# Patient Record
Sex: MALE | Race: WHITE | NOT HISPANIC OR LATINO | Employment: OTHER | URBAN - METROPOLITAN AREA
[De-identification: names, ages, dates, MRNs, and addresses within clinical notes are randomized per-mention and may not be internally consistent; named-entity substitution may affect disease eponyms.]

---

## 2017-03-06 ENCOUNTER — HOSPITAL ENCOUNTER (OUTPATIENT)
Dept: CT IMAGING | Facility: HOSPITAL | Age: 72
Discharge: HOME | End: 2017-03-06
Payer: MEDICARE

## 2017-03-06 DIAGNOSIS — I71.2 THORACIC AORTIC ANEURYSM WITHOUT RUPTURE (HCC): ICD-10-CM

## 2017-03-06 PROCEDURE — 71250 CT THORAX DX C-: CPT

## 2017-03-07 ENCOUNTER — TRANSCRIBE ORDERS (OUTPATIENT)
Dept: ADMINISTRATIVE | Facility: HOSPITAL | Age: 72
End: 2017-03-07

## 2017-03-07 ENCOUNTER — GENERIC CONVERSION - ENCOUNTER (OUTPATIENT)
Dept: OTHER | Facility: OTHER | Age: 72
End: 2017-03-07

## 2017-03-07 ENCOUNTER — HOSPITAL ENCOUNTER (EMERGENCY)
Facility: HOSPITAL | Age: 72
Discharge: HOME | End: 2017-03-07
Attending: EMERGENCY MEDICINE
Payer: MEDICARE

## 2017-03-07 ENCOUNTER — HOSPITAL ENCOUNTER (OUTPATIENT)
Dept: ULTRASOUND IMAGING | Facility: HOSPITAL | Age: 72
Discharge: HOME | End: 2017-03-07
Payer: MEDICARE

## 2017-03-07 ENCOUNTER — HOSPITAL ENCOUNTER (OUTPATIENT)
Dept: CT IMAGING | Facility: HOSPITAL | Age: 72
Discharge: HOME | End: 2017-03-07
Payer: MEDICARE

## 2017-03-07 VITALS
DIASTOLIC BLOOD PRESSURE: 83 MMHG | HEART RATE: 56 BPM | SYSTOLIC BLOOD PRESSURE: 153 MMHG | WEIGHT: 183 LBS | OXYGEN SATURATION: 95 % | TEMPERATURE: 98.4 F | RESPIRATION RATE: 18 BRPM

## 2017-03-07 DIAGNOSIS — T81.718A IATROGENIC PULMONARY EMBOLISM AND INFARCTION, INITIAL ENCOUNTER (HCC): Primary | ICD-10-CM

## 2017-03-07 DIAGNOSIS — I26.99 IATROGENIC PULMONARY EMBOLISM AND INFARCTION, INITIAL ENCOUNTER (HCC): ICD-10-CM

## 2017-03-07 DIAGNOSIS — I82.402 ACUTE DEEP VEIN THROMBOSIS (DVT) OF LEFT LOWER EXTREMITY (HCC): Primary | ICD-10-CM

## 2017-03-07 DIAGNOSIS — I26.99 IATROGENIC PULMONARY EMBOLISM AND INFARCTION, INITIAL ENCOUNTER (HCC): Primary | ICD-10-CM

## 2017-03-07 DIAGNOSIS — I26.99 OTHER PULMONARY EMBOLISM WITHOUT ACUTE COR PULMONALE (HCC): ICD-10-CM

## 2017-03-07 DIAGNOSIS — T81.718A IATROGENIC PULMONARY EMBOLISM AND INFARCTION, INITIAL ENCOUNTER (HCC): ICD-10-CM

## 2017-03-07 PROCEDURE — 71275 CT ANGIOGRAPHY CHEST: CPT

## 2017-03-07 PROCEDURE — 93970 EXTREMITY STUDY: CPT

## 2017-03-07 PROCEDURE — 99283 EMERGENCY DEPT VISIT LOW MDM: CPT

## 2017-03-07 RX ADMIN — IOHEXOL 85 ML: 350 INJECTION, SOLUTION INTRAVENOUS at 18:13

## 2017-03-08 ENCOUNTER — GENERIC CONVERSION - ENCOUNTER (OUTPATIENT)
Dept: OTHER | Facility: OTHER | Age: 72
End: 2017-03-08

## 2017-03-13 ENCOUNTER — ALLSCRIPTS OFFICE VISIT (OUTPATIENT)
Dept: OTHER | Facility: OTHER | Age: 72
End: 2017-03-13

## 2017-03-21 ENCOUNTER — APPOINTMENT (OUTPATIENT)
Dept: LAB | Facility: CLINIC | Age: 72
End: 2017-03-21
Payer: MEDICARE

## 2017-03-21 ENCOUNTER — GENERIC CONVERSION - ENCOUNTER (OUTPATIENT)
Dept: OTHER | Facility: OTHER | Age: 72
End: 2017-03-21

## 2017-03-21 ENCOUNTER — TRANSCRIBE ORDERS (OUTPATIENT)
Dept: LAB | Facility: CLINIC | Age: 72
End: 2017-03-21

## 2017-03-21 DIAGNOSIS — D64.9 ANEMIA: ICD-10-CM

## 2017-03-21 DIAGNOSIS — E03.9 HYPOTHYROIDISM: ICD-10-CM

## 2017-03-21 DIAGNOSIS — I10 ESSENTIAL (PRIMARY) HYPERTENSION: ICD-10-CM

## 2017-03-21 DIAGNOSIS — E55.9 VITAMIN D DEFICIENCY: ICD-10-CM

## 2017-03-21 DIAGNOSIS — Z12.5 ENCOUNTER FOR SCREENING FOR MALIGNANT NEOPLASM OF PROSTATE: ICD-10-CM

## 2017-03-21 DIAGNOSIS — F41.9 ANXIETY DISORDER: ICD-10-CM

## 2017-03-21 DIAGNOSIS — R73.09 OTHER ABNORMAL GLUCOSE: ICD-10-CM

## 2017-03-21 DIAGNOSIS — E78.5 HYPERLIPIDEMIA: ICD-10-CM

## 2017-03-21 LAB
25(OH)D3 SERPL-MCNC: 70.8 NG/ML (ref 30–100)
ALBUMIN SERPL BCP-MCNC: 4 G/DL (ref 3.5–5)
ALP SERPL-CCNC: 65 U/L (ref 46–116)
ALT SERPL W P-5'-P-CCNC: 22 U/L (ref 12–78)
ANION GAP SERPL CALCULATED.3IONS-SCNC: 8 MMOL/L (ref 4–13)
AST SERPL W P-5'-P-CCNC: 20 U/L (ref 5–45)
BASOPHILS # BLD AUTO: 0.06 THOUSANDS/ΜL (ref 0–0.1)
BASOPHILS NFR BLD AUTO: 1 % (ref 0–1)
BILIRUB SERPL-MCNC: 1.21 MG/DL (ref 0.2–1)
BUN SERPL-MCNC: 14 MG/DL (ref 5–25)
CALCIUM SERPL-MCNC: 8.3 MG/DL (ref 8.3–10.1)
CHLORIDE SERPL-SCNC: 98 MMOL/L (ref 100–108)
CHOLEST SERPL-MCNC: 148 MG/DL (ref 50–200)
CO2 SERPL-SCNC: 27 MMOL/L (ref 21–32)
CREAT SERPL-MCNC: 0.9 MG/DL (ref 0.6–1.3)
EOSINOPHIL # BLD AUTO: 0.07 THOUSAND/ΜL (ref 0–0.61)
EOSINOPHIL NFR BLD AUTO: 1 % (ref 0–6)
ERYTHROCYTE [DISTWIDTH] IN BLOOD BY AUTOMATED COUNT: 15.8 % (ref 11.6–15.1)
EST. AVERAGE GLUCOSE BLD GHB EST-MCNC: 134 MG/DL
GFR SERPL CREATININE-BSD FRML MDRD: >60 ML/MIN/1.73SQ M
GLUCOSE P FAST SERPL-MCNC: 100 MG/DL (ref 65–99)
HBA1C MFR BLD: 6.3 % (ref 4.2–6.3)
HCT VFR BLD AUTO: 40.3 % (ref 36.5–49.3)
HDLC SERPL-MCNC: 61 MG/DL (ref 40–60)
HGB BLD-MCNC: 13.9 G/DL (ref 12–17)
LDLC SERPL CALC-MCNC: 80 MG/DL (ref 0–100)
LYMPHOCYTES # BLD AUTO: 2.53 THOUSANDS/ΜL (ref 0.6–4.47)
LYMPHOCYTES NFR BLD AUTO: 46 % (ref 14–44)
MCH RBC QN AUTO: 28.9 PG (ref 26.8–34.3)
MCHC RBC AUTO-ENTMCNC: 34.5 G/DL (ref 31.4–37.4)
MCV RBC AUTO: 84 FL (ref 82–98)
MONOCYTES # BLD AUTO: 0.44 THOUSAND/ΜL (ref 0.17–1.22)
MONOCYTES NFR BLD AUTO: 8 % (ref 4–12)
NEUTROPHILS # BLD AUTO: 2.43 THOUSANDS/ΜL (ref 1.85–7.62)
NEUTS SEG NFR BLD AUTO: 44 % (ref 43–75)
NRBC BLD AUTO-RTO: 0 /100 WBCS
PLATELET # BLD AUTO: 254 THOUSANDS/UL (ref 149–390)
PMV BLD AUTO: 10.5 FL (ref 8.9–12.7)
POTASSIUM SERPL-SCNC: 4.4 MMOL/L (ref 3.5–5.3)
PROT SERPL-MCNC: 7.2 G/DL (ref 6.4–8.2)
RBC # BLD AUTO: 4.81 MILLION/UL (ref 3.88–5.62)
SODIUM SERPL-SCNC: 133 MMOL/L (ref 136–145)
T4 FREE SERPL-MCNC: 1.11 NG/DL (ref 0.76–1.46)
TRIGL SERPL-MCNC: 37 MG/DL
TSH SERPL DL<=0.05 MIU/L-ACNC: 1.48 UIU/ML (ref 0.36–3.74)
WBC # BLD AUTO: 5.54 THOUSAND/UL (ref 4.31–10.16)

## 2017-03-21 PROCEDURE — 84443 ASSAY THYROID STIM HORMONE: CPT

## 2017-03-21 PROCEDURE — 84154 ASSAY OF PSA FREE: CPT

## 2017-03-21 PROCEDURE — 83036 HEMOGLOBIN GLYCOSYLATED A1C: CPT

## 2017-03-21 PROCEDURE — 36415 COLL VENOUS BLD VENIPUNCTURE: CPT

## 2017-03-21 PROCEDURE — G0103 PSA SCREENING: HCPCS

## 2017-03-21 PROCEDURE — 82306 VITAMIN D 25 HYDROXY: CPT

## 2017-03-21 PROCEDURE — 84439 ASSAY OF FREE THYROXINE: CPT

## 2017-03-21 PROCEDURE — 80061 LIPID PANEL: CPT

## 2017-03-21 PROCEDURE — 80053 COMPREHEN METABOLIC PANEL: CPT

## 2017-03-21 PROCEDURE — 85025 COMPLETE CBC W/AUTO DIFF WBC: CPT

## 2017-03-22 ENCOUNTER — GENERIC CONVERSION - ENCOUNTER (OUTPATIENT)
Dept: OTHER | Facility: OTHER | Age: 72
End: 2017-03-22

## 2017-03-22 LAB
PSA FREE MFR SERPL: 27.3 %
PSA FREE SERPL-MCNC: 0.9 NG/ML
PSA SERPL-MCNC: 3.3 NG/ML (ref 0–4)

## 2017-03-27 ENCOUNTER — ALLSCRIPTS OFFICE VISIT (OUTPATIENT)
Dept: OTHER | Facility: OTHER | Age: 72
End: 2017-03-27

## 2017-04-04 ENCOUNTER — GENERIC CONVERSION - ENCOUNTER (OUTPATIENT)
Dept: OTHER | Facility: OTHER | Age: 72
End: 2017-04-04

## 2017-04-04 ENCOUNTER — HOSPITAL ENCOUNTER (OUTPATIENT)
Dept: NON INVASIVE DIAGNOSTICS | Facility: CLINIC | Age: 72
Discharge: HOME | End: 2017-04-04
Payer: MEDICARE

## 2017-04-04 DIAGNOSIS — I82.442 EMBOLISM AND THROMBOSIS OF LEFT TIBIAL VEIN (HCC): ICD-10-CM

## 2017-04-04 PROCEDURE — 93970 EXTREMITY STUDY: CPT

## 2017-06-20 ENCOUNTER — APPOINTMENT (OUTPATIENT)
Dept: LAB | Facility: CLINIC | Age: 72
End: 2017-06-20
Payer: MEDICARE

## 2017-06-20 ENCOUNTER — TRANSCRIBE ORDERS (OUTPATIENT)
Dept: LAB | Facility: CLINIC | Age: 72
End: 2017-06-20

## 2017-06-20 ENCOUNTER — GENERIC CONVERSION - ENCOUNTER (OUTPATIENT)
Dept: OTHER | Facility: OTHER | Age: 72
End: 2017-06-20

## 2017-06-20 DIAGNOSIS — Z00.00 ENCOUNTER FOR GENERAL ADULT MEDICAL EXAMINATION WITHOUT ABNORMAL FINDINGS: ICD-10-CM

## 2017-06-20 DIAGNOSIS — E55.9 VITAMIN D DEFICIENCY: ICD-10-CM

## 2017-06-20 DIAGNOSIS — E03.9 HYPOTHYROIDISM: ICD-10-CM

## 2017-06-20 DIAGNOSIS — I10 ESSENTIAL (PRIMARY) HYPERTENSION: ICD-10-CM

## 2017-06-20 DIAGNOSIS — E78.5 HYPERLIPIDEMIA: ICD-10-CM

## 2017-06-20 DIAGNOSIS — F41.9 ANXIETY DISORDER: ICD-10-CM

## 2017-06-20 DIAGNOSIS — I82.442 EMBOLISM AND THROMBOSIS OF LEFT TIBIAL VEIN (HCC): ICD-10-CM

## 2017-06-20 DIAGNOSIS — I71.2 THORACIC AORTIC ANEURYSM WITHOUT RUPTURE (HCC): ICD-10-CM

## 2017-06-20 DIAGNOSIS — R73.03 PREDIABETES: ICD-10-CM

## 2017-06-20 LAB
ALBUMIN SERPL BCP-MCNC: 4 G/DL (ref 3.5–5)
ALP SERPL-CCNC: 57 U/L (ref 46–116)
ALT SERPL W P-5'-P-CCNC: 26 U/L (ref 12–78)
ANION GAP SERPL CALCULATED.3IONS-SCNC: 9 MMOL/L (ref 4–13)
AST SERPL W P-5'-P-CCNC: 22 U/L (ref 5–45)
BILIRUB SERPL-MCNC: 1.47 MG/DL (ref 0.2–1)
BUN SERPL-MCNC: 14 MG/DL (ref 5–25)
CALCIUM SERPL-MCNC: 8.7 MG/DL (ref 8.3–10.1)
CHLORIDE SERPL-SCNC: 96 MMOL/L (ref 100–108)
CO2 SERPL-SCNC: 27 MMOL/L (ref 21–32)
CREAT SERPL-MCNC: 0.8 MG/DL (ref 0.6–1.3)
EST. AVERAGE GLUCOSE BLD GHB EST-MCNC: 123 MG/DL
GFR SERPL CREATININE-BSD FRML MDRD: >60 ML/MIN/1.73SQ M
GLUCOSE P FAST SERPL-MCNC: 99 MG/DL (ref 65–99)
HBA1C MFR BLD: 5.9 % (ref 4.2–6.3)
POTASSIUM SERPL-SCNC: 4.6 MMOL/L (ref 3.5–5.3)
PROT SERPL-MCNC: 6.9 G/DL (ref 6.4–8.2)
SODIUM SERPL-SCNC: 132 MMOL/L (ref 136–145)

## 2017-06-20 PROCEDURE — 80053 COMPREHEN METABOLIC PANEL: CPT

## 2017-06-20 PROCEDURE — 83036 HEMOGLOBIN GLYCOSYLATED A1C: CPT

## 2017-06-20 PROCEDURE — 36415 COLL VENOUS BLD VENIPUNCTURE: CPT

## 2017-06-21 ENCOUNTER — TRANSCRIBE ORDERS (OUTPATIENT)
Dept: ADMINISTRATIVE | Facility: HOSPITAL | Age: 72
End: 2017-06-21

## 2017-06-21 DIAGNOSIS — C73 THYROID CANCER (HCC): Primary | ICD-10-CM

## 2017-06-22 ENCOUNTER — HOSPITAL ENCOUNTER (OUTPATIENT)
Dept: ULTRASOUND IMAGING | Facility: HOSPITAL | Age: 72
Discharge: HOME | End: 2017-06-22
Attending: INTERNAL MEDICINE
Payer: MEDICARE

## 2017-06-22 DIAGNOSIS — C73 THYROID CANCER (HCC): ICD-10-CM

## 2017-06-22 PROCEDURE — 76536 US EXAM OF HEAD AND NECK: CPT

## 2017-06-27 ENCOUNTER — GENERIC CONVERSION - ENCOUNTER (OUTPATIENT)
Dept: OTHER | Facility: OTHER | Age: 72
End: 2017-06-27

## 2017-06-29 ENCOUNTER — ALLSCRIPTS OFFICE VISIT (OUTPATIENT)
Dept: OTHER | Facility: OTHER | Age: 72
End: 2017-06-29

## 2017-06-29 ENCOUNTER — TRANSCRIBE ORDERS (OUTPATIENT)
Dept: ADMINISTRATIVE | Facility: HOSPITAL | Age: 72
End: 2017-06-29

## 2017-06-29 DIAGNOSIS — R91.8 PULMONARY NODULES: Primary | ICD-10-CM

## 2017-06-29 DIAGNOSIS — E03.9 HYPOTHYROIDISM: ICD-10-CM

## 2017-06-29 DIAGNOSIS — R60.0 LOCALIZED EDEMA: ICD-10-CM

## 2017-06-29 DIAGNOSIS — R91.8 OTHER NONSPECIFIC ABNORMAL FINDING OF LUNG FIELD: ICD-10-CM

## 2017-07-25 ENCOUNTER — ALLSCRIPTS OFFICE VISIT (OUTPATIENT)
Dept: OTHER | Facility: OTHER | Age: 72
End: 2017-07-25

## 2017-09-01 DIAGNOSIS — Z01.812 ENCOUNTER FOR PREPROCEDURAL LABORATORY EXAMINATION: ICD-10-CM

## 2017-09-11 ENCOUNTER — GENERIC CONVERSION - ENCOUNTER (OUTPATIENT)
Dept: OTHER | Facility: OTHER | Age: 72
End: 2017-09-11

## 2017-09-11 ENCOUNTER — TRANSCRIBE ORDERS (OUTPATIENT)
Dept: LAB | Facility: CLINIC | Age: 72
End: 2017-09-11

## 2017-09-11 ENCOUNTER — APPOINTMENT (OUTPATIENT)
Dept: LAB | Facility: CLINIC | Age: 72
End: 2017-09-11
Payer: MEDICARE

## 2017-09-11 DIAGNOSIS — Z01.812 ENCOUNTER FOR PREPROCEDURAL LABORATORY EXAMINATION: ICD-10-CM

## 2017-09-11 LAB
ANION GAP SERPL CALCULATED.3IONS-SCNC: 7 MMOL/L (ref 4–13)
BUN SERPL-MCNC: 12 MG/DL (ref 5–25)
CALCIUM SERPL-MCNC: 8.8 MG/DL (ref 8.3–10.1)
CHLORIDE SERPL-SCNC: 100 MMOL/L (ref 100–108)
CO2 SERPL-SCNC: 28 MMOL/L (ref 21–32)
CREAT SERPL-MCNC: 0.78 MG/DL (ref 0.6–1.3)
GFR SERPL CREATININE-BSD FRML MDRD: 90 ML/MIN/1.73SQ M
GLUCOSE P FAST SERPL-MCNC: 79 MG/DL (ref 65–99)
POTASSIUM SERPL-SCNC: 4 MMOL/L (ref 3.5–5.3)
SODIUM SERPL-SCNC: 135 MMOL/L (ref 136–145)

## 2017-09-11 PROCEDURE — 36415 COLL VENOUS BLD VENIPUNCTURE: CPT

## 2017-09-11 PROCEDURE — 80048 BASIC METABOLIC PNL TOTAL CA: CPT

## 2017-09-19 ENCOUNTER — ALLSCRIPTS OFFICE VISIT (OUTPATIENT)
Dept: OTHER | Facility: OTHER | Age: 72
End: 2017-09-19

## 2017-09-22 ENCOUNTER — ALLSCRIPTS OFFICE VISIT (OUTPATIENT)
Dept: OTHER | Facility: OTHER | Age: 72
End: 2017-09-22

## 2017-09-29 ENCOUNTER — HOSPITAL ENCOUNTER (OUTPATIENT)
Dept: CT IMAGING | Facility: HOSPITAL | Age: 72
Discharge: HOME | End: 2017-09-29
Payer: MEDICARE

## 2017-09-29 ENCOUNTER — HOSPITAL ENCOUNTER (OUTPATIENT)
Dept: ULTRASOUND IMAGING | Facility: HOSPITAL | Age: 72
Discharge: HOME | End: 2017-09-29
Payer: MEDICARE

## 2017-09-29 DIAGNOSIS — R60.0 LOCALIZED EDEMA: ICD-10-CM

## 2017-09-29 DIAGNOSIS — R91.8 PULMONARY NODULES: ICD-10-CM

## 2017-09-29 PROCEDURE — 71275 CT ANGIOGRAPHY CHEST: CPT

## 2017-09-29 PROCEDURE — 93970 EXTREMITY STUDY: CPT

## 2017-09-29 RX ADMIN — IOHEXOL 85 ML: 350 INJECTION, SOLUTION INTRAVENOUS at 19:16

## 2017-10-01 ENCOUNTER — GENERIC CONVERSION - ENCOUNTER (OUTPATIENT)
Dept: OTHER | Facility: OTHER | Age: 72
End: 2017-10-01

## 2017-10-04 ENCOUNTER — ALLSCRIPTS OFFICE VISIT (OUTPATIENT)
Dept: OTHER | Facility: OTHER | Age: 72
End: 2017-10-04

## 2017-10-25 ENCOUNTER — TRANSCRIBE ORDERS (OUTPATIENT)
Dept: LAB | Facility: CLINIC | Age: 72
End: 2017-10-25

## 2017-10-25 ENCOUNTER — APPOINTMENT (OUTPATIENT)
Dept: LAB | Facility: CLINIC | Age: 72
End: 2017-10-25
Payer: MEDICARE

## 2017-10-25 ENCOUNTER — GENERIC CONVERSION - ENCOUNTER (OUTPATIENT)
Dept: OTHER | Facility: OTHER | Age: 72
End: 2017-10-25

## 2017-10-25 DIAGNOSIS — I51.9 MYXEDEMA HEART DISEASE: Primary | ICD-10-CM

## 2017-10-25 DIAGNOSIS — E03.9 HYPOTHYROIDISM: ICD-10-CM

## 2017-10-25 DIAGNOSIS — E03.9 MYXEDEMA HEART DISEASE: Primary | ICD-10-CM

## 2017-10-25 LAB
T4 FREE SERPL-MCNC: 1.14 NG/DL (ref 0.76–1.46)
TSH SERPL DL<=0.05 MIU/L-ACNC: 3.1 UIU/ML (ref 0.36–3.74)

## 2017-10-25 PROCEDURE — 36415 COLL VENOUS BLD VENIPUNCTURE: CPT

## 2017-10-25 PROCEDURE — 86800 THYROGLOBULIN ANTIBODY: CPT

## 2017-10-25 PROCEDURE — 84443 ASSAY THYROID STIM HORMONE: CPT

## 2017-10-25 PROCEDURE — 84439 ASSAY OF FREE THYROXINE: CPT

## 2017-10-25 PROCEDURE — 84432 ASSAY OF THYROGLOBULIN: CPT

## 2017-10-26 NOTE — PROGRESS NOTES
Assessment  Assessed    1  Paroxysmal atrial fibrillation (427 31) (I48 0)   2  Aneurysm, ascending aorta (441 2) (I71 2)   3  Sinus bradycardia (427 89) (R00 1)   4  Benign essential hypertension (401 1) (I10)   5  Deep vein thrombosis (DVT) of left lower extremity (453 40) (I82 402)    Plan  Benign essential hypertension    · Lisinopril 20 MG Oral Tablet; Take 1 tablet daily   Rx By: Sonja James; Dispense: 90 Days ; #:90 Tablet; Refill: 3;For: Benign essential hypertension; ROLDAN = N; Verified Transmission to [facility]; Last Updated By: System, SureScripts; 9/19/2017 10:32:54 AM   · EKG/ECG- POC; Status:Complete;   Done: 07GTN4200   Perform: In Office; Due:48Yom5821; Last Updated By:Mikhail Harris; 9/19/2017 10:13:41 AM;Ordered; For:Benign essential hypertension; Ordered By:Jose David Bishop;  Benign essential hypertension, Paroxysmal atrial fibrillation    · Metoprolol Succinate ER 25 MG Oral Tablet Extended Release 24 Hour; take one  half tablet daily   Rx By: Sonja James; Dispense: 90 Days ; #:45 Tablet Extended Release 24 Hour; Refill: 3;For: Benign essential hypertension, Paroxysmal atrial fibrillation; ROLDAN = N; Verified Transmission to [facility]; Last Updated By: System, SureScripts; 9/19/2017 10:32:54 AM  Hyperlipidemia    · Pravastatin Sodium 10 MG Oral Tablet; Take 1 tablet daily   Rx By: Sonja James; Dispense: 90 Days ; #:90 Tablet; Refill: 3;For: Hyperlipidemia; ROLDAN = N; Verified Transmission to [facility]; Last Updated By: System, SureScripts; 9/19/2017 10:32:55 AM  Paroxysmal atrial fibrillation    · Follow-Up visit in 9 months Evaluation and Treatment  Follow-up  Status: Hold For -  Scheduling  Requested for: 27BSZ9413   Ordered; For: Paroxysmal atrial fibrillation; Ordered By: Sonja James Performed:  Due: 58GEE8690    Discussion/Summary  Cardiology Discussion Summary Free Text Note Form [facility]:    It is my impression the patient is doing well with the above-noted diagnoses  His atrial fibrillation has not been problematic in terms of symptoms  He is now on Xarelto for DVT but this is also indicated for PAF (CHADS vasc2 of 2 soon to be 3) and he should remain on this long term  I have reduced his ASA to 81 mg daily  He does have sinus bradycardia but is asymptomatic from this and he has had no profound bradycardia  His thoracic aorta enlargement is being followed by CT surgery  His blood pressure was acceptable on his current medical regimen of lisinopril and metoprolol    His lipids have been excellent on low dose pravastatin  I will see him again in 9 months time  Chief Complaint  Chief Complaint Free Text Note Form: 9 month FU for evaluation of his paroxysmal afib, sick sinus syndrome with sinus bradycardia, hypertension and hyperlipidemia  Andriy Ryan recently diagnosed with TAA  Andriy Ryan also has DVT   Chief Complaint Chronic Condition [facility]: Patient is here today for follow up of chronic conditions described in HPI  History of Present Illness  Cardiology HPI Free Text Note Form [facility]: Since his last visit he had a CT scan which suggested an old PE  This led to a doppler which did show a DVT of the LLE  His is now on Xarelto  His TAA has enlarged to 4 5 cm  He denies chest pain or SOB  He does have some edema of the LEs  He denies palpitations or lightheadedness  Review of Systems  Cardiology Male ROS:     Cardiac: rhythm problems-- and-- has swelling in the LEs, but-- no chest pain,-- no fainting/blackouts,-- no heart murmur present-- and-- no palpitations present  Musculoskeletal: arthritis, but-- no back pain   ROS Reviewed:   ROS reviewed  Active Problems  Problems    1  Acute pulmonary embolism (415 19) (I26 99)   2  Acute UTI (599 0) (N39 0)   3  Acute UTI (599 0) (N39 0)   4  Anemia (285 9) (D64 9)   5  Aneurysm, ascending aorta (441 2) (I71 2)   6  Anxiety (300 00) (F41 9)   7  Atypical chest pain (786 59) (R07 89)   8   Benign essential hypertension (401 1) (I10)   9  Cellulitis of leg, right (682 6) (L03 115)   10  Deep vein thrombosis (DVT) of left lower extremity (453 40) (I82 402)   11  Edema of right lower extremity (782 3) (R60 0)   12  Elevated prostate specific antigen (PSA) (790 93) (R97 20)   13  Embolism and thrombosis of tibial vein, left (453 42) (I82 442)   14  Encounter for prostate cancer screening (V76 44) (Z12 5)   15  Esophageal reflux (530 81) (K21 9)   16  Glucose Intolerance (271 3)   17  Hematuria (599 70) (R31 9)   18  Hyperlipidemia (272 4) (E78 5)   19  Hypothyroidism (244 9) (E03 9)   20  Impaired glucose tolerance test (790 22) (R73 02)   21  Need for influenza vaccination (V04 81) (Z23)   22  Need for prophylactic vaccination and inoculation against influenza (V04 81) (Z23)   23  Osteoporosis (733 00) (M81 0)   24  Paroxysmal atrial fibrillation (427 31) (I48 0)   25  Postprocedural hypothyroidism (244 0) (E89 0)   26  Pre-diabetes (790 29) (R73 03)   27  Pre-procedural laboratory examination (V72 63) (Z01 812)   28  Pulmonary nodules (793 19) (R91 8)   29  Screening for depression (V79 0) (Z13 89)   30  Sick sinus syndrome (427 81) (I49 5)   31  Sinus bradycardia (427 89) (R00 1)   32  Thyroid cancer (193) (C73)   33  Vitamin D deficiency (268 9) (E55 9)    Past Medical History  Problems    1  History of Arthritis (V13 4)   2  History of Epididymis Disorders   3  History of atrial fibrillation (V12 59) (Z86 79)   4  History of hypertension (V12 59) (Z86 79)   5  History of malignant neoplasm of thyroid (V10 87) (Z85 850)   6  History of osteopenia (V13 59) (Z87 39)   7  History of pulmonary embolism (V12 55) (Z86 711)   8  Hyperlipidemia (272 4) (E78 5)  Active Problems And Past Medical History Reviewed: The active problems and past medical history were reviewed and updated today  Surgical History  Problems    1  History of Appendectomy   2  History of Complete Colonoscopy   3   History of Diagnostic Cystoscopy   4  History of Thyroid Surgery  Surgical History Reviewed: The surgical history was reviewed and updated today  Family History  Mother    1  Family history of hypertension (V17 49) (Z82 49)   2  FH: stroke (V17 1) (Z82 3)  Father    3  Family history of Acute Myocardial Infarction (V17 3)   4  Family history of Coronary Artery Disease (V17 49)  Family History Reviewed: The family history was reviewed and updated today  Social History  Problems    · Denied: History of Being A Social Drinker   · Denied: History of Drug Use   · Former smoker (Z84 06) (Y47 895)  Social History Reviewed: The social history was reviewed and updated today  The social history was reviewed and is unchanged  Current Meds   1  ALPRAZolam 0 25 MG Oral Tablet; take 1 tablet daily prn; Therapy: 74ZQR3034 to (Evaluate:11Jun2017)  Requested for: 67Ttw9148; Last   Rx:71Smz8609 Ordered   2  Aspirin EC 81 MG Oral Tablet Delayed Release; TAKE 1 TABLET DAILY; Therapy: (Recorded:85Rvq1090) to Recorded   3  Levothyroxine Sodium 175 MCG Oral Tablet; Take 1 tablet daily; Therapy: 45PKX6841 to (Last Rx:12Jun2017)  Requested for: 12Jun2017 Ordered   4  Lisinopril 20 MG Oral Tablet; Take 1 tablet daily; Therapy: 17PVQ4949 to (Amy Stokes)  Requested for: 78Joa0783; Last   Rx:38Sni6213 Ordered   5  Metoprolol Succinate ER 25 MG Oral Tablet Extended Release 24 Hour; Take 1 tablet   daily; Therapy: 77YCA4150 to (Last Rx:83Vgj3895)  Requested for: 06Jez4378 Ordered   6  Pravastatin Sodium 10 MG Oral Tablet; Take 1 tablet daily; Therapy: 69OAH0034 to (Amy Madjordan)  Requested for: 03Ytd0825; Last   Rx:58Rkv6142 Ordered   7  Xarelto 20 MG Oral Tablet; TAKE ONE TABLET DAILY AT THE SAME TIME EACH DAY    Requested for: 54Qzm2274; Last Rx:91Xdt2787 Ordered  Medication List Reviewed: The medication list was reviewed and updated today  Allergies  Medication    1  Penicillins   2   Percocet TABS    Vitals  Vital Signs    Recorded: 64Dzp3285 10:27AM   Heart Rate 50   Systolic 651   Diastolic 90   Height 5 ft 9 in   Weight 182 lb    BMI Calculated 26 88   BSA Calculated 1 98     Physical Exam    Constitutional   General appearance: No acute distress, well appearing and well nourished  Eyes   Conjunctiva and Sclera examination: Conjunctiva pink, sclera anicteric  Ears, Nose, Mouth, and Throat - Oropharynx: Clear, nares are clear, mucous membranes are moist    Pulmonary   Auscultation of lungs: Clear to auscultation, no rales, no rhonchi, no wheezing, good air movement  Cardiovascular   Auscultation of heart: Abnormal  -- grade I-2 systolic murmur at base w/o diastolic murmur,rub or gallop  Carotid pulses: Normal, 2+ bilaterally  Pedal pulses: Normal, 2+ bilaterally  Examination of extremities for edema and/or varicosities: Abnormal  -- trace edema of the LEs  Dorathy Infield venostasis changes  Chest -   Sternum: Normal     Abdomen   Abdomen: Non-tender and no distention  Liver and spleen: No hepatomegaly or splenomegaly  Future Appointments    Date/Time Provider Specialty Site   11/06/2017 11:30 AM PILLO Villela   Endocrinology [facility] ENDOCRINOLOGY   10/06/2017 11:30 AM Ryan Jimenez DO Family Medicine [facility]     Signatures   Electronically signed by : PILLO Morataya ; Sep 19 2017 10:39AM EST                       (Author)

## 2017-10-31 LAB
THYROGLOB AB SERPL-ACNC: 40.9 IU/ML (ref 0–0.9)
THYROGLOB SERPL-MCNC: 7.1 NG/ML

## 2017-11-01 ENCOUNTER — GENERIC CONVERSION - ENCOUNTER (OUTPATIENT)
Dept: OTHER | Facility: OTHER | Age: 72
End: 2017-11-01

## 2017-11-06 ENCOUNTER — GENERIC CONVERSION - ENCOUNTER (OUTPATIENT)
Dept: OTHER | Facility: OTHER | Age: 72
End: 2017-11-06

## 2017-11-06 DIAGNOSIS — C73 MALIGNANT NEOPLASM OF THYROID GLAND (HCC): ICD-10-CM

## 2018-01-09 NOTE — RESULT NOTES
Verified Results  (1) TSH 25Oct2017 10:48AM Tej Bran    Order Number: [id number]     Test Name Result Flag Reference   TSH 3 100 uIU/mL  0 358-3 740   Patients undergoing fluorescein dye angiography may retain small amounts of fluorescein in the body for 48-72 hours post procedure  Samples containing fluorescein can produce falsely depressed TSH values  If the patient had this procedure,a specimen should be resubmitted post fluorescein clearance  (1) T4, FREE 25Oct2017 10:48AM Tej Bran    Order Number: [id number]     Test Name Result Flag Reference   T4,FREE 1 14 ng/dL  0 76-1 46   Specimen collection should occur prior to Sulfasalazine administration due to the potential for falsely elevated results

## 2018-01-11 NOTE — RESULT NOTES
Discussion/Summary   Antibody levels are decreasing  This is a good sign  Plan to monitor over time  Verified Results  (1) THYROGLOBULIN/QUANT W/ANTIBODY PANEL 65Lii4000 10:48AM Ledy Domingo     Test Name Result Flag Reference   THYROGLOB AB 40 9 IU/mL H 0 0 - 0 9   Thyroglobulin Antibody measured by [facility] Methodology  Performed at:  [address]  [id number]  : Abdi Giang MD, Phone:  [phone]   THYROGLOBULIN (TG-ERMA) 7 1 ng/mL     Reference Range:  Pubertal Children  and Adults: <40  According to the [facility] of Clinical Biochemistry,  the reference interval for Thyroglobulin (TG) should be  related to euthyroid patients and not for patients who  underwent thyroidectomy  TG reference intervals for these  patients depend on the residual mass of the thyroid tissue  left after surgery  Establishing a post-operative baseline  is recommended  The assay quantitation limit is 2 0 ng/mL    Performed at:  [address] [facility]  [address] [place] [address]  [de-identified]  : Matt Hill MD, Phone:  [phone]

## 2018-01-11 NOTE — RESULT NOTES
Message   No pulmonary embolism  Stable ascending thoracic aortic aneurysm measuring 4 5 cm  Stable tiny pulmonary nodules described on CT study from yesterday  Will need f-up CT scan chest in 6 months for pulmonary nodules and f-up for aortic aneurysm  Verified Results  CTA CHEST PE STUDY 74HMI6410 05:23PM Pj Mulligan     Test Name Result Flag Reference   CTA CHEST PE STUDY (Report)     CTA - CHEST WITH IV CONTRAST - PULMONARY ANGIOGRAM     INDICATION: Evaluate for pulmonary embolism  History of aortic aneurysm and thyroid cancer  COMPARISON: CT chest 3/6/2017 and 8/26/2016  TECHNIQUE: CTA examination of the chest was performed using angiographic technique according to a protocol specifically tailored to evaluate for pulmonary embolism  This examination, like all CT scans performed in the [facility], was    performed utilizing techniques to minimize radiation dose exposure, including the use of iterative reconstruction and automated exposure control  Axial, sagittal and coronal reformatted images were submitted for interpretation  In addition, coronal 3D   MIP postprocessing was performed on the acquisition scanner  Rad dose 455 03 mGy      IV Contrast: 85 mL of iohexol (OMNIPAQUE)        FINDINGS:     PULMONARY ARTERIAL TREE: No pulmonary embolus is seen  No pulmonary arterial enlargement  LUNGS: Tiny 1-2 mm nodules are stable  There is no tracheal or endobronchial lesion  PLEURA: Unremarkable  HEART/AORTA: Heart is stable in size  Stable ascending thoracic aortic aneurysm measuring 4 4 cm  MEDIASTINUM AND WILL: Unremarkable  CHEST WALL AND LOWER NECK: Status post thyroidectomy  VISUALIZED STRUCTURES IN THE UPPER ABDOMEN: Unremarkable  OSSEOUS STRUCTURES: No acute fracture or destructive osseous lesion  IMPRESSION:     No pulmonary embolism  Stable ascending thoracic aortic aneurysm measuring 4 5 cm       Stable tiny pulmonary nodules described on CT study from yesterday  Workstation performed: [id number]     Signed by:    Aileen Lopez MD   3/7/17

## 2018-01-11 NOTE — RESULT NOTES
Message   low sugar diet encouraged for HGA1C at 6 3  Will discuss labs at next office visit soon  Verified Results  (1) COMPREHENSIVE METABOLIC PANEL 98WYQ1092 19:69GI Che[place]enne Comment   TW Order Number: [id number]     Test Name Result Flag Reference   SODIUM 133 mmol/L L 136-145   POTASSIUM 4 4 mmol/L  3 5-5 3   CHLORIDE 98 mmol/L L 100-108   CARBON DIOXIDE 27 mmol/L  21-32   ANION GAP (CALC) 8 mmol/L  4-13   BLOOD UREA NITROGEN 14 mg/dL  5-25   CREATININE 0 90 mg/dL  0 60-1 30   Standardized to IDMS reference method   CALCIUM 8 3 mg/dL  8 3-10 1   BILI, TOTAL 1 21 mg/dL H 0 20-1 00   ALK PHOSPHATAS 65 U/L     ALT (SGPT) 22 U/L  12-78   AST(SGOT) 20 U/L  5-45   ALBUMIN 4 0 g/dL  3 5-5 0   TOTAL PROTEIN 7 2 g/dL  6 4-8 2   eGFR Non-African American      >60 0 ml/min/1 73sq m   - Patient Instructions: This is a fasting blood test  Water, black tea or black coffee only after 9:00pm the night before test Drink 2 glasses of water the morning of test - Patient Instructions: This bloodwork is non-fasting  Please drink two glasses of   water morning of bloodwork  National Kidney Disease Education Program recommendations are as follows:  GFR calculation is accurate only with a steady state creatinine  Chronic Kidney disease less than 60 ml/min/1 73 sq  meters  Kidney failure less than 15 ml/min/1 73 sq  meters  GLUCOSE FASTING 100 mg/dL H 65-99     (1) HEMOGLOBIN A1C 21Mar2017 08:02AM Cheyenne Comment   TW Order Number: [id number]     Test Name Result Flag Reference   HEMOGLOBIN A1C 6 3 %  4 2-6 3   EST  AVG  GLUCOSE 134 mg/dl       (1) LIPID PANEL FASTING W DIRECT LDL REFLEX 24BEH2014 08:02AM Cheyenne Comment   TW Order Number: [id number]     Test Name Result Flag Reference   CHOLESTEROL 148 mg/dL     LDL CHOLESTEROL CALCULATED 80 mg/dL  0-100   - Patient Instructions:  This is a fasting blood test  Water, black tea or black coffee only after 9:00pm the night before test   Drink 2 glasses of water the morning of test     - Patient Instructions: This is a fasting blood test  Water, black tea or black coffee only after 9:00pm the night before test Drink 2 glasses of water the morning of test - Patient Instructions: This bloodwork is non-fasting  Please drink two glasses of   water morning of bloodwork  Triglyceride:         Normal              <150 mg/dl       Borderline High    150-199 mg/dl       High               200-499 mg/dl       Very High          >499 mg/dl  Cholesterol:         Desirable        <200 mg/dl      Borderline High  200-239 mg/dl      High             >239 mg/dl  HDL Cholesterol:        High    >59 mg/dL      Low     <41 mg/dL  LDL Cholesterol:        Optimal          <100 mg/dl        Near Optimal     100-129 mg/dl        Above Optimal          Borderline High   130-159 mg/dl          High              160-189 mg/dl          Very High        >189 mg/dl  LDL CALCULATED:    This screening LDL is a calculated result  It does not have the accuracy of the Direct Measured LDL in the monitoring of patients with hyperlipidemia and/or statin therapy  Direct Measure LDL (HAU697) must be ordered separately in these patients  TRIGLYCERIDES 37 mg/dL  <=150   Specimen collection should occur prior to N-Acetylcysteine or Metamizole administration due to the potential for falsely depressed results  HDL,DIRECT 61 mg/dL H 40-60   Specimen collection should occur prior to Metamizole administration due to the potential for falsely depressed results  (1) T4, FREE 21Mar2017 08:02AM Milady HENSLEY Order Number: [id number]     Test Name Result Flag Reference   T4,FREE 1 11 ng/dL  0 76-1 46   - Patient Instructions: This is a fasting blood test  Water, black tea or black coffee only after 9:00pm the night before test Drink 2 glasses of water the morning of test - Patient Instructions: This bloodwork is non-fasting  Please drink two glasses of   water morning of bloodwork  (1) TSH 06MEA5128 08:02AM Sabi Chanel    Order Number: [id number]     Test Name Result Flag Reference   TSH 1 480 uIU/mL  0 358-3 740   - Patient Instructions: This bloodwork is non-fasting  Please drink two glasses of water morning of bloodwork  - Patient Instructions: This is a fasting blood test  Water, black tea or black coffee only after 9:00pm the night before test Drink 2 glasses of water the morning of test - Patient Instructions: This bloodwork is non-fasting  Please drink two glasses of   water morning of bloodwork  Patients undergoing fluorescein dye angiography may retain small amounts of fluorescein in the body for 48-72 hours post procedure  Samples containing fluorescein can produce falsely depressed TSH values  If the patient had this procedure,a specimen should be resubmitted post fluorescein clearance  (1) CBC/PLT/DIFF 12ZVH0445 08:02AM Sabi Chanel    Order Number: [id number]     Test Name Result Flag Reference   WBC COUNT 5 54 Thousand/uL  4 31-10 16   RBC COUNT 4 81 Million/uL  3 88-5 62   HEMOGLOBIN 13 9 g/dL  12 0-17 0   HEMATOCRIT 40 3 %  36 5-49 3   MCV 84 fL  82-98   MCH 28 9 pg  26 8-34 3   MCHC 34 5 g/dL  31 4-37 4   RDW 15 8 % H 11 6-15 1   MPV 10 5 fL  8 9-12 7   PLATELET COUNT 651 Thousands/uL  149-390   nRBC AUTOMATED 0 /100 WBCs     NEUTROPHILS RELATIVE PERCENT 44 %  43-75   LYMPHOCYTES RELATIVE PERCENT 46 % H 14-44   MONOCYTES RELATIVE PERCENT 8 %  4-12   EOSINOPHILS RELATIVE PERCENT 1 %  0-6   BASOPHILS RELATIVE PERCENT 1 %  0-1   NEUTROPHILS ABSOLUTE COUNT 2 43 Thousands/? ??L  1 85-7 62   LYMPHOCYTES ABSOLUTE COUNT 2 53 Thousands/? ??L  0 60-4 47   MONOCYTES ABSOLUTE COUNT 0 44 Thousand/? ??L  0 17-1 22   EOSINOPHILS ABSOLUTE COUNT 0 07 Thousand/? ??L  0 00-0 61   BASOPHILS ABSOLUTE COUNT 0 06 Thousands/? ??L  0 00-0 10   - Patient Instructions: This bloodwork is non-fasting    Please drink two glasses of water morning of bloodwork  - Patient Instructions: This bloodwork is non-fasting  Please drink two glasses of water morning of bloodwork  (1) VITAMIN D 25-HYDROXY 94VBZ5964 08:02AM Elle Carolina   AYDEN Order Number: [id number]     Test Name Result Flag Reference   VIT D 25-HYDROX 70 8 ng/mL  30 0-100 0   This assay is a certified procedure of the CDC Vitamin D Standardization Certification Program (VDSCP)     Deficiency <20ng/ml   Insufficiency 20-30ng/ml   Sufficient  ng/ml     *Patients undergoing fluorescein dye angiography may retain small amounts of fluorescein in the body for 48-72 hours post procedure  Samples containing fluorescein can produce falsely elevated Vitamin D values  If the patient had this procedure, a specimen should be resubmitted post fluorescein clearance

## 2018-01-12 VITALS
HEIGHT: 69 IN | WEIGHT: 179 LBS | BODY MASS INDEX: 26.51 KG/M2 | DIASTOLIC BLOOD PRESSURE: 70 MMHG | SYSTOLIC BLOOD PRESSURE: 122 MMHG

## 2018-01-12 NOTE — RESULT NOTES
Verified Results  (1) COMPREHENSIVE METABOLIC PANEL 17VTP1975 81:79JB Sabi Chanel   TW Order Number: [id number]     Test Name Result Flag Reference   SODIUM 132 mmol/L L 136-145   POTASSIUM 4 6 mmol/L  3 5-5 3   CHLORIDE 96 mmol/L L 100-108   CARBON DIOXIDE 27 mmol/L  21-32   ANION GAP (CALC) 9 mmol/L  4-13   BLOOD UREA NITROGEN 14 mg/dL  5-25   CREATININE 0 80 mg/dL  0 60-1 30   Standardized to IDMS reference method   CALCIUM 8 7 mg/dL  8 3-10 1   BILI, TOTAL 1 47 mg/dL H 0 20-1 00   ALK PHOSPHATAS 57 U/L     ALT (SGPT) 26 U/L  12-78   AST(SGOT) 22 U/L  5-45   ALBUMIN 4 0 g/dL  3 5-5 0   TOTAL PROTEIN 6 9 g/dL  6 4-8 2   eGFR Non-African American      >60 0 ml/min/1 73sq [facility] Disease Education Program recommendations are as follows:  GFR calculation is accurate only with a steady state creatinine  Chronic Kidney disease less than 60 ml/min/1 73 sq  meters  Kidney failure less than 15 ml/min/1 73 sq  meters  GLUCOSE FASTING 99 mg/dL  65-99     (1) HEMOGLOBIN A1C 20Jun2017 10:44AM Sabi HENSLEY Order Number: [id number]     Test Name Result Flag Reference   HEMOGLOBIN A1C 5 9 %  4 2-6 3   EST  AVG   GLUCOSE 123 mg/dl

## 2018-01-12 NOTE — PROCEDURES
Assessment    1  Hematuria (459 05) (R31 9)    Plan  Hematuria    · (1) URINALYSIS (will reflex a microscopy if leukocytes, occult blood, protein or nitrites are  not within normal limits); Status:Active - Retrospective By Protocol Authorization; Requested for:01Feb2016;    Perform:[facility]; AUD:[id number]; Last Updated Vamshi Cart; 2/1/2016 10:02:43 AM;Ordered;  Walter Marie; Ordered By:Cresencio Wright;   · (1) URINE CULTURE; Source:Urine, Clean Catch; Status:Active - Retrospective By  Protocol Authorization; Requested for:01Feb2016;    Perform:[facility]; FOV:[id number]; Last Updated Vamshi Cart; 2/1/2016 10:02:43 AM;Ordered;  Walter Marie; Ordered By:Cresencio Wright;   · (1) URINE CYTOLOGY; Source:Urine, Clean Catch; Status:Active - Retrospective By  Protocol Authorization; Requested for:01Feb2016;    Perform:[facility]; VZA:[id number]; Last Updated Vamshi Cart; 2/1/2016 10:02:43 AM;Ordered;  Walter Marie; Ordered By:Cresencio Wright;   · Urine Dip Non-Automated- POC; Status:Complete - Retrospective By Protocol  Authorization;   Done: 11GSK4601 10:00AM   Performed: In Office; Due:69Vws2174; Last Updated Vamshi Cart; 2/1/2016 10:01:32 AM;Ordered;  Walter Marie; Ordered By:Daiana Wright; Discussion/Summary  Discussion Summary:   There is no abnormality on cystoscopy or ultrasound  He is no longer having any issues  We will check urine studies, but barring any abnormality, he may follow-up as needed  He will notify us if he develops any further symptoms  Understands and agrees with treatment plan: The treatment plan was reviewed with the patient/guardian   The patient/guardian understands and agrees with the treatment plan      Chief Complaint  Chief Complaint Free Text Note Form: pt presents cysto; hematuria      Review of Systems  Complete-Male Urology:   Genitourinary: Empty sensation and stream quality good, but no dysuria, no urinary hesitancy, no hematuria, no incontinence, no nocturia and no feelings of urinary urgency  Active Problems    1  Acute UTI (599 0) (N39 0)   2  Acute UTI (599 0) (N39 0)   3  Anemia (285 9) (D64 9)   4  Anxiety (300 00) (F41 9)   5  Atypical chest pain (786 59) (R07 89)   6  Benign essential hypertension (401 1) (I10)   7  Elevated prostate specific antigen (PSA) (790 93) (R97 2)   8  Esophageal reflux (530 81) (K21 9)   9  Glucose Intolerance (271 3)   10  Hematuria (599 70) (R31 9)   11  Hyperlipidemia (272 4) (E78 5)   12  Hypothyroidism (244 9) (E03 9)   13  Impaired glucose tolerance test (790 22) (R73 02)   14  Need for prophylactic vaccination and inoculation against influenza (V04 81) (Z23)   15  Osteoporosis (733 00) (M81 0)   16  Paroxysmal atrial fibrillation (427 31) (I48 0)   17  Postprocedural hypothyroidism (244 0) (E89 0)   18  Prediabetes (790 29) (R73 09)   19  Sick sinus syndrome (427 81) (I49 5)   20  Sinus bradycardia (427 89) (R00 1)   21  Thyroid cancer (193) (C73)   22  Vitamin D deficiency (268 9) (E55 9)    Past Medical History    1  History of Arthritis (V13 4)   2  History of Epididymis Disorders   3  History of hypertension (V12 59) (Z86 79)   4  History of malignant neoplasm of thyroid (V10 87) (Z85 850)   5  History of osteopenia (V13 59) (Z87 39)   6  Hyperlipidemia (272 4) (E78 5)    Surgical History    1  History of Appendectomy   2  History of Complete Colonoscopy   3  History of Diagnostic Cystoscopy   4  History of Thyroid Surgery    Family History    1  Family history of Acute Myocardial Infarction (V17 3)   2  Family history of Coronary Artery Disease (V17 49)    Social History    · Denied: History of Being A Social Drinker   · Denied: History of Drug Use   · Former smoker (V15 82) (Q56 933)    Current Meds   1  ALPRAZolam 0 25 MG Oral Tablet; take 1 tablet daily prn; Therapy: 42JKJ4259 to (Yaz Severance)  Requested for: 50GXD6100; Last   Rx:02Jun2015 Ordered   2  Aspirin  MG Oral Tablet Delayed Release; Take 1 tablet daily Recorded   3  Levothyroxine Sodium 175 MCG Oral Tablet; take one tablet by mouth daily; Therapy: 89RSM6779 to (Evaluate:91Bng8102)  Requested for: 01GLK5050; Last   Rx:24Hqh8403 Ordered   4  Lisinopril 20 MG Oral Tablet; Take 1 tablet daily; Therapy: 49EQQ0249 to (Evaluate:27Zvr0050)  Requested for: 59CPY2957; Last   Rx:86Mhf8022 Ordered   5  Metoprolol Succinate ER 25 MG Oral Tablet Extended Release 24 Hour; Take 1 tablet   daily; Therapy: 20TSK6246 to (Last Rx:17Jun2015)  Requested for: 17Jun2015 Ordered   6  Pravastatin Sodium 10 MG Oral Tablet; Take 1 tablet daily; Therapy: 90NYB0327 to (Evaluate:80Miz0570)  Requested for: 04WMP6329; Last   Rx:61Ktl1624 Ordered    Allergies    1  Penicillins    Vitals  Vital Signs [Data Includes: Current Encounter]    Recorded: [id number] 09:56AM   Heart Rate 56   Systolic 312   Diastolic 82   Height 5 ft 9 in   Weight 185 lb    BMI Calculated 27 32   BSA Calculated 2     Results/Data  Encounter Results   Urine Dip Non-Automated- POC 45LWM3922 10:00AM Lilo Estefany     Test Name Result Flag Reference   Color Yellow     Clarity Transparent     Leukocytes -     Nitrite -     Blood -     Bilirubin -     Protein -     Ph 7 0     Specific Gravity 1 005     Ketone -     Glucose -         Procedure    Procedure: diagnostic cystourethroscopy  Indications for the procedure include hematuria  Risks, risk of bleeding and infection risks were discussed with the patient  Written consent was obtained prior to the procedure and is detailed in the patient's record  Anesthesia: the urethra was lubricated with 2% lidocaine gel  Procedure Note:    The patient was prepped and draped in the usual sterile fashion using betadine  The periurethral area was exposed and a lubricated 16 German flexible cystoscope was introduced into the urethral meatus   The urethra was normal  The prostate was visualized at the proximal urethra and bladder neck and the prostate appeared normal  All regions of the bladder were systematically inspected and the bladder appeared normal    Post-procedure: the bladder was drained and the cystoscope was removed      Future Appointments    Date/Time Provider Specialty Site   10/31/2016 12:50 PM Beryle Kayser, M D  Endocrinology [facility] ENDOCRINOLOGY   03/31/2016 11:00 AM [address]   03/08/2016 11:00 AM PILLO Joseph   Cardiology  CARDIOLOGY  [place]     Signatures   Electronically signed by : PILLO Sheriff ; Feb 1 2016 10:45AM EST                       (Author)

## 2018-01-12 NOTE — RESULT NOTES
Message   venous duplex shows  in LEFT LOWER LIMB: ABNORMAL    Subacute to chronic deep vein thrombosis is noted in one of the paired    posterior tibial vein at the mid calf level  Continue Xarelto and rec  f-up venous duplex in 3 months for f-up of left leg DVT  Verified Results  VAS LOWER LIMB VENOUS DUPLEX STUDY, COMPLETE BILATERAL [id number] 12:43PM Everett De León Order Number: [id number]    - Patient Instructions: To schedule this appointment, please contact Central Scheduling at [phone]  Test Name Result Flag Reference   VAS LOWER LIMB VENOUS DUPLEX STUDY, COMPLETE BILATERAL (Report)     [facility]PORT   CLINICAL:   Indications: Left Acute embolism and thrombosis of left tibial vein [I82 442]  On 3/7/2017- left one of the paired posterior tibial vein mid calf DVT  Evaluation to re-evaluate previous noted left posterior for propagation/   resolution  Patient currently on Xarelto therapy  Clinical:   There is no complaint of pain and edema  FINDINGS:      Segment   Right      Left             Impression    Impression       CFV     Normal (Patent) Normal (Patent)    PostTibial          Subacute                 CONCLUSION:   Impression:   RIGHT LOWER LIMB:   No evidence of acute or chronic deep vein thrombosis  No evidence of superficial thrombophlebitis noted  Doppler evaluation shows a normal response to augmentation maneuvers  Popliteal, posterior tibial and anterior tibial arterial Doppler waveforms are   triphasic  In comparison to the study of 3/7/2017, there is no significant interval change   in the disease process  LEFT LOWER LIMB: ABNORMAL   Subacute to chronic deep vein thrombosis is noted in one of the paired   posterior tibial vein at the mid calf level  Evaluation shows no evidence of thrombus in the common femoral vein, femoral   vein, popliteal vein, and peroneal veins   No evidence of superficial   thrombophlebitis noted  Doppler evaluation shows a normal response to augmentation maneuvers  Popliteal, posterior tibial and anterior tibial arterial Doppler waveforms are   triphasic  In comparison to the study of 3/7/2017, there is no significant interval change   in the disease process        SIGNATURE:   Electronically Signed by: Michelle Roberto MD on 2017-04-04 03:54:06 PM

## 2018-01-12 NOTE — RESULT NOTES
Verified Results  CTA CHEST PE STUDY [id number] 05:48PM Helane Antis     Test Name Result Flag Reference   CTA CHEST PE STUDY (Report)     CTA - CHEST WITH IV CONTRAST - PULMONARY ANGIOGRAM     INDICATION: History of afibrillation  Follow-up of prior pulmonary embolus study  History of DVT  COMPARISON: CTA chest PE study 3/7/2017  TECHNIQUE: CTA examination of the chest was performed using angiographic technique according to a protocol specifically tailored to evaluate for pulmonary embolism  Reformatted images were created in axial, sagittal, and coronal planes  In addition,    coronal 3D MIP postprocessing was performed on the acquisition scanner  Radiation dose length product (DLP) for this visit: 370 mGy-cm   This examination, like all CT scans performed in the [facility], was performed utilizing techniques to minimize radiation dose exposure, including the use of iterative    reconstruction and automated exposure control  IV Contrast: 85 mL of iohexol (OMNIPAQUE) 350      FINDINGS:     PULMONARY ARTERIAL TREE: No pulmonary embolus is seen  LUNGS: Previously described tiny scattered pulmonary nodules are stable  Some of these are calcified  PLEURA: Unremarkable  HEART/AORTA: Cardiomegaly  Stable ascending aortic aneurysm measuring up to 43 mm  MEDIASTINUM AND WILL: Unremarkable  CHEST WALL AND LOWER NECK:    Status post thyroidectomy  Thyroid bed surgical clips  VISUALIZED STRUCTURES IN THE UPPER ABDOMEN: Unremarkable  OSSEOUS STRUCTURES: No acute fracture or destructive osseous lesion  IMPRESSION:     No acute finding; specifically, no pulmonary arterial embolism  Stable ascending aortic aneurysm measuring 43 mm  Stable tiny scattered pulmonary nodules               Workstation performed: [id number]     Signed by:   Aravind Sanchez MD   9/29/17     VAS LOWER LIMB VENOUS DUPLEX STUDY, COMPLETE BILATERAL [id number] 05:47PM Diane Ziggy Order Number: [id number]    - Patient Instructions: To schedule this appointment, please contact Central Scheduling at [phone]  Test Name Result Flag Reference   VAS LOWER LIMB VENOUS DUPLEX STUDY, COMPLETE BILATERAL (Report)     [facility] REPORT   CLINICAL:   Indications: Patient presents for a follow up of DVT in one of the paired left   posterior tibial veins  He has been on xarelto since March  FINDINGS:      Segment Right      Left          Impression    Impression       CFV   Normal (Patent) Normal (Patent)             CONCLUSION:   Impression:   RIGHT LOWER LIMB:   No evidence of acute or chronic deep vein thrombosis  No evidence of superficial thrombophlebitis noted  Doppler evaluation shows a normal response to augmentation maneuvers  Popliteal, posterior tibial and anterior tibial arterial Doppler waveforms are   triphasic  LEFT LOWER LIMB:   No evidence of acute or chronic deep vein thrombosis  No evidence of superficial thrombophlebitis noted  Doppler evaluation shows a normal response to augmentation maneuvers  Popliteal, posterior tibial and anterior tibial arterial Doppler waveforms are   triphasic        SIGNATURE:   Electronically Signed by: Fina Kitchen MD on 2017-09-29 10:38:22 PM

## 2018-01-13 NOTE — RESULT NOTES
Verified Results  [address] [id number] 10:12AM Dayan Interiano     Test Name Result Flag Reference   [address] (Report)     No Address;   01/08/2016 1030   01/08/2016 1100   NONE     CENTRAL DXA SCAN     CLINICAL HISTORY-  65year old l  male risk factors include   personal history of thyroid cancer  TECHNIQUE- Bone densitometry was performed using a Hologic [place] C   bone densitometer  Regions of interest appear properly placed  There   are no obvious fractures or other confounding variables which could   limit the study  Degenerative or osteoarthritic changes appear to be   present on the spine image at L1 and L2 as well as L4      COMPARISON- Follow-up     RESULTS-    LUMBAR SPINE- L1-L4-   BMD 1 098 gm/cm2   T-score 0 1 and 4% less than 2012 and 5% less than 2008  Z-score +1 0     LEFT TOTAL HIP-   BMD 0 853 gm/cm2   T-score below normal, -1 2   Z-score -0 5     LEFT FEMORAL NECK-   BMD 0 635 gm/cm2   T-score below normal, -2 2 and unchanged from 2012 and 4% less than   2008, not statistically significant   Z-score -0 9     The forearm BMD is 0 740 and the T score is below normal, -1 5  The Z   score is 0 0  The Frax score in this patient identifies the risk of a major   osteoporotic fracture in the next 10 years at 8 9% and a hip fracture   risk of 2 9%  ASSESSMENT-   1  Based on the WHO classification, this study identifies moderate   femoral neck osteopenia as well as forearm osteopenia and the patient   is considered at risk for fracture  2  A daily intake of calcium of at least 1200 mg and vitamin D,   800-1000 IU, as well as weight bearing and muscle strengthening   exercise, fall prevention and avoidance of tobacco and excessive   alcohol intake as basic preventive measures are recommended  3  Repeat DXA scan in 24-36 months as clinically indicated        WHO CLASSIFICATION-   Normal (a T-score of -1 0 or higher)   Low bone mineral density (a T-score of less than -1 0 but higher than   -2 5)   Osteoporosis (a T-score of -2 5 or less)   Severe osteoporosis (a T-score of -2 5 or less with a fragility   fracture)     Thank you for allowing us the opportunity to participate in your   patient care  The expanded DEXA report will no longer be arriving in your mail  If   you desire to view the full report please contact [facility]'s medical   records or access the PACS system           Transcribed on- [id number]     KRISTI Guillermo MD   [place] Radiologist- KRISTI Ivey MD   Electronically Signed- KRISTI Ivey MD   Released Date Time- 01/08/16 1617   ------------------------------------------------------------------------------   16318^F Brenda Escobar MD   54010^M Brenda Escobar MD     * Dexa Scan Axial Skel (Hip, Pelvis, Spine) [id number] 10:12AM Tiffany Bernardo     Test Name Result Flag Reference   Dexa Scan (Report)     No Address;   01/08/2016 1030   01/08/2016 1100   NONE     CENTRAL DXA SCAN     CLINICAL HISTORY-  65year old l  male risk factors include   personal history of thyroid cancer  TECHNIQUE- Bone densitometry was performed using a Hologic [place] C   bone densitometer  Regions of interest appear properly placed  There   are no obvious fractures or other confounding variables which could   limit the study  Degenerative or osteoarthritic changes appear to be   present on the spine image at L1 and L2 as well as L4      COMPARISON- Follow-up     RESULTS-    LUMBAR SPINE- L1-L4-   BMD 1 098 gm/cm2   T-score 0 1 and 4% less than 2012 and 5% less than 2008     Z-score +1 0     LEFT TOTAL HIP-   BMD 0 853 gm/cm2   T-score below normal, -1 2   Z-score -0 5     LEFT FEMORAL NECK-   BMD 0 635 gm/cm2   T-score below normal, -2 2 and unchanged from 2012 and 4% less than   2008, not statistically significant   Z-score -0 9     The forearm BMD is 0 740 and the T score is below normal, -1 5  The Z   score is 0 0  The Frax score in this patient identifies the risk of a major   osteoporotic fracture in the next 10 years at 8 9% and a hip fracture   risk of 2 9%  ASSESSMENT-   1  Based on the WHO classification, this study identifies moderate   femoral neck osteopenia as well as forearm osteopenia and the patient   is considered at risk for fracture  2  A daily intake of calcium of at least 1200 mg and vitamin D,   800-1000 IU, as well as weight bearing and muscle strengthening   exercise, fall prevention and avoidance of tobacco and excessive   alcohol intake as basic preventive measures are recommended  3  Repeat DXA scan in 24-36 months as clinically indicated  WHO CLASSIFICATION-   Normal (a T-score of -1 0 or higher)   Low bone mineral density (a T-score of less than -1 0 but higher than   -2 5)   Osteoporosis (a T-score of -2 5 or less)   Severe osteoporosis (a T-score of -2 5 or less with a fragility   fracture)     Thank you for allowing us the opportunity to participate in your   patient care  The expanded DEXA report will no longer be arriving in your mail   If   you desire to view the full report please contact [facility]'s medical   records or access the PACS system           Transcribed on- [id number]     KRISTI Fernandez MD   [place] Radiologist- KRISTI Bowen MD   Electronically Signed- KRISTI Bowen MD   Released Date Time- 01/08/16 1617   ------------------------------------------------------------------------------   21621^Y Berenice Salvador MD   30579^M Berenice Salvador MD     U/S Head and Neck ( Soft Tissue) [id number] 09:59AM Jessika Jovel     Test Name Result Flag Reference   U/S Head and Neck (Report)     [address] [facility];;[place];[address]   01/05/2016 1042   01/05/2016 1100   1 IMAGES     NECK ULTRASOUND     INDICATION-  History of papillary carcinoma  COMPARISON- 1/8/2015  FINDINGS-     Ultrasound of the thyroidectomy bed and cervical lymph node chains was   again performed with a high frequency linear transducer  There is no suspicion of recurrent mass in the thyroidectomy bed  Single prominent 16 x 5 x 9 mm slightly hypervascular right anterior   jugular lymph node is identified without obvious hilum  Remaining   lymph nodes are normal in morphology  IMPRESSION-      Single prominent hypervascular right anterior jugular lymph node   without obvious hilum  Consider short-term ultrasound follow-up versus   biopsy for further characterization           ##sigslh##sigslh               Transcribed on- [id number]     KRISTI Olsen MD   [place] Radiologist- KRISTI Griffiths MD   Electronically Signed- KRISTI Griffiths MD   Released Date Time- 01/05/16 1139   ------------------------------------------------------------------------------   [id number]^DEANA CALLAWAY   77050^DEANA Schafer

## 2018-01-14 VITALS
SYSTOLIC BLOOD PRESSURE: 122 MMHG | WEIGHT: 179 LBS | OXYGEN SATURATION: 96 % | BODY MASS INDEX: 26.51 KG/M2 | DIASTOLIC BLOOD PRESSURE: 64 MMHG | TEMPERATURE: 98 F | HEART RATE: 56 BPM | HEIGHT: 69 IN | RESPIRATION RATE: 16 BRPM

## 2018-01-14 VITALS
WEIGHT: 184.25 LBS | HEIGHT: 69 IN | BODY MASS INDEX: 27.29 KG/M2 | DIASTOLIC BLOOD PRESSURE: 60 MMHG | SYSTOLIC BLOOD PRESSURE: 108 MMHG

## 2018-01-14 VITALS
HEIGHT: 69 IN | DIASTOLIC BLOOD PRESSURE: 90 MMHG | SYSTOLIC BLOOD PRESSURE: 140 MMHG | HEART RATE: 50 BPM | BODY MASS INDEX: 26.96 KG/M2 | WEIGHT: 182 LBS

## 2018-01-14 NOTE — RESULT NOTES
Message   No evidence of thyroid cancer on this ultrasound  Verified Results  US HEAD NECK SOFT TISSUE [id number] 10:32AM Ankur Velasquez     Test Name Result Flag Reference   US HEAD NECK SOFT TISSUE (Report)     NECK ULTRASOUND     INDICATION:  History of her for some neoplasm  Thyroidectomy 2010  Follow-up hypervascular right anterior jugular inferior lymph node     COMPARISON: 1/5/2016 and prior     FINDINGS:     Ultrasound of the thyroidectomy bed and cervical lymph node chains was performed with a high frequency linear transducer  There is no suspicion of recurrent mass in the thyroidectomy bed  Lymph nodes maintain normal morphologic contour, echogenicity and short axis dimensions of less than 0 7 cm  Hypervascular lymph node within the inferior aspect of the right anterior jugular chain measuring 1 6 x 0 4 x 0 5 cm, previously 1 6 x 0 5 x 0 9    cm  Less prominent vascularity  Mildly vascular node mid left anterior jugular chain 1 1 x 0 3 x 0 6 cm without significant change  Can be reassessed at follow-up  No evidence for microcalcification or focal nodularity  IMPRESSION:     No evidence of recurrent or metastatic disease         Workstation performed: [id number]     Signed by:   Denilson Beltran MD   6/14/16

## 2018-01-15 VITALS
OXYGEN SATURATION: 99 % | DIASTOLIC BLOOD PRESSURE: 80 MMHG | RESPIRATION RATE: 14 BRPM | HEIGHT: 69 IN | HEART RATE: 56 BPM | BODY MASS INDEX: 27.85 KG/M2 | WEIGHT: 188 LBS | SYSTOLIC BLOOD PRESSURE: 134 MMHG

## 2018-01-15 VITALS
HEIGHT: 69 IN | BODY MASS INDEX: 26.44 KG/M2 | WEIGHT: 178.5 LBS | DIASTOLIC BLOOD PRESSURE: 80 MMHG | SYSTOLIC BLOOD PRESSURE: 130 MMHG

## 2018-01-15 NOTE — RESULT NOTES
Verified Results  (1) BASIC METABOLIC PROFILE [id number] 10:56AM Paula Flores Order Number: [id number]     Test Name Result Flag Reference   SODIUM 135 mmol/L L 136-145   POTASSIUM 4 0 mmol/L  3 5-5 3   CHLORIDE 100 mmol/L  100-108   CARBON DIOXIDE 28 mmol/L  21-32   ANION GAP (CALC) 7 mmol/L  4-13   BLOOD UREA NITROGEN 12 mg/dL  5-25   CREATININE 0 78 mg/dL  0 60-1 30   Standardized to IDMS reference method   CALCIUM 8 8 mg/dL  8 3-10 1   eGFR 90 ml/min/1 73sq m     National Kidney Disease Education Program recommendations are as follows:  GFR calculation is accurate only with a steady state creatinine  Chronic Kidney disease less than 60 ml/min/1 73 sq  meters  Kidney failure less than 15 ml/min/1 73 sq  meters  GLUCOSE FASTING 79 mg/dL  65-99   Specimen collection should occur prior to Sulfasalazine administration due to the potential for falsely depressed results  Specimen collection should occur prior to Sulfapyridine administration due to the potential for falsely elevated results

## 2018-01-15 NOTE — RESULT NOTES
Message   Make sure pt  has f-up with us for his DVT that was found and medication check up for this  Verified Results  VAS LOWER LIMB VENOUS DUPLEX STUDY, COMPLETE BILATERAL [id number] 05:21PM Mago Mata Order Number: [id number]    - Patient Instructions: To schedule this appointment, please contact Central Scheduling at [phone]  Test Name Result Flag Reference   VAS LOWER LIMB VENOUS DUPLEX STUDY, COMPLETE BILATERAL (Report)     [facility] REPORT   CLINICAL:   Indications: Pulmonary Emboli [I26 99]  Evidence of pulmonary embolus on prior   CT from 9/20/16  Patient negative on CT today for pulmonary emboli  Patient has   no symptoms  FINDINGS:      Segment   Right      Left             Impression    Impression        CFV     Normal (Patent)              PostTibial          Thrombosed (acute)             CONCLUSION:   Impression:   RIGHT LOWER LIMB:   No evidence of acute or chronic deep vein thrombosis  No evidence of superficial thrombophlebitis noted  Doppler evaluation shows a normal response to augmentation maneuvers  Popliteal, posterior tibial and anterior tibial arterial Doppler waveforms are   triphasic  LEFT LOWER LIMB:   There is acute deep vein thrombosis in one of the paired posterior tibial veins   in the mid calf  No evidence of superficial thrombophlebitis noted  Doppler evaluation shows a normal response to augmentation maneuvers  Popliteal, posterior tibial and anterior tibial arterial Doppler waveforms are   triphasic        Tech Note: Preliminary report given to Dr Halina Onofre at 7:20pm  Patient to go to   the ER per Dr Sayra Sim:   Electronically Signed by: Aston Mcknight on 2017-03-08 02:57:37 PM

## 2018-01-15 NOTE — RESULT NOTES
Message   PSA wnl  Verified Results  (1) PSA FREE & TOTAL 21Mar2017 08:02AM Sangeeta Gudino    Order Number: [id number]     Test Name Result Flag Reference   PSA, FREE 0 90 ng/mL  N/A   Roche ECLIA methodology  % FREE PSA 27 3 %     The table below lists the probability of prostate cancer for  men with non-suspicious KARLA results and total PSA between  4 and 10 ng/mL, by patient age Mahendra Eduardo, [address] [place] [address],  [id number])  % Free PSA       50-64 yr        65-75 yr                    0 00-10 00%        56%             55%                   10 01-15 00%        24%             35%                   15 01-20 00%        17%             23%                   20 01-25 00%        10%             20%                        >25 00%         5%              9%  Please note:  Reyes et al did not make specific                recommendations regarding the use of                percent free PSA for any other population                of men  Performed at:  [address] [place] [address]  [id number]  : Sanchez Lindo MD, Phone:  [phone]   PROSTATE SPECIFIC ANTIGEN TOTAL 3 3 ng/mL  0 0 - 4 0   Roche ECLIA methodology  According to the American Urological Association, Serum PSA should  decrease and remain at undetectable levels after radical  prostatectomy  The AUA defines biochemical recurrence as an initial  PSA value 0 2 ng/mL or greater followed by a subsequent confirmatory  PSA value 0 2 ng/mL or greater  Values obtained with different assay methods or kits cannot be used  interchangeably  Results cannot be interpreted as absolute evidence  of the presence or absence of malignant disease

## 2018-01-16 NOTE — RESULT NOTES
Message   Please at the written report and make sure it has the Thyroglobulin level on it       Verified Results  (1) SPECIAL TEST [id number] 04:17PM Lobito Right     Test Name Result Flag Reference   TEST RESULT      SEE WRITTEN REPORT FROM [facility]

## 2018-01-16 NOTE — RESULT NOTES
Message   normal     Verified Results  (1) T4, FREE 31Oct2016 04:06PM Lucille Oneal Order Number: [id number]     Test Name Result Flag Reference   T4,FREE 1 18 ng/dL  0 76-1 46   - Patient Instructions: This bloodwork is non-fasting  Please drink two glasses of water morning of bloodwork  (1) TSH 31Oct2016 04:06PM Lucille Oneal Order Number: [id number]     Test Name Result Flag Reference   TSH 1 360 uIU/mL  0 358-3 740   - Patient Instructions: This bloodwork is non-fasting  Please drink two glasses of water morning of bloodwork  - Patient Instructions: This bloodwork is non-fasting  Please drink two glasses of water morning of bloodwork  Patients undergoing fluorescein dye angiography may retain small amounts of fluorescein in the body for 48-72 hours post procedure  Samples containing fluorescein can produce falsely depressed TSH values  If the patient had this procedure,a specimen should be resubmitted post fluorescein clearance

## 2018-01-16 NOTE — RESULT NOTES
Message   mildly anemic and prediabetic, will discuss at next office visit  Verified Results  (1) COMPREHENSIVE METABOLIC PANEL [id number] 43:12PM [place], [address] Kidney Disease Education Program recommendations are as follows:  GFR calculation is accurate only with a steady state creatinine  Chronic Kidney disease less than 60 ml/min/1 73 sq  meters  Kidney failure less than 15 ml/min/1 73 sq  meters  Test Name Result Flag Reference   GLUCOSE,RANDM 98 mg/dL     If the patient is fasting, the ADA then defines impaired fasting glucose as > 100 mg/dL and diabetes as > or equal to 123 mg/dL     SODIUM 138 mmol/L  136-145   POTASSIUM 4 7 mmol/L  3 5-5 3   CHLORIDE 103 mmol/L  100-108   CARBON DIOXIDE 28 mmol/L  21-32   ANION GAP (CALC) 7 mmol/L  4-13   BLOOD UREA NITROGEN 14 mg/dL  5-25   CREATININE 0 89 mg/dL  0 60-1 30   Standardized to IDMS reference method   CALCIUM 8 1 mg/dL L 8 3-10 1   BILI, TOTAL 0 85 mg/dL  0 20-1 00   ALK PHOSPHATAS 61 U/L     ALT (SGPT) 32 U/L  12-78   AST(SGOT) 25 U/L  5-45   ALBUMIN 3 7 g/dL  3 5-5 0   TOTAL PROTEIN 6 7 g/dL  6 4-8 2   eGFR Non-African American      >60 0 ml/min/1 73sq m     (1) CBC/PLT/DIFF 22Mar2016 07:56AM Verline Mace     Test Name Result Flag Reference   WBC COUNT 5 06 Thousand/uL  4 31-10 16   RBC COUNT 4 53 Million/uL  3 88-5 62   HEMOGLOBIN 11 2 g/dL L 12 0-17 0   HEMATOCRIT 35 0 % L 36 5-49 3   MCV 77 fL L 82-98   MCH 24 7 pg L 26 8-34 3   MCHC 32 0 g/dL  31 4-37 4   RDW 17 3 % H 11 6-15 1   MPV 10 4 fL  8 9-12 7   PLATELET COUNT 963 Thousands/uL  149-390   nRBC AUTOMATED 0 /100 WBCs     NEUTROPHILS RELATIVE PERCENT 50 %  43-75   LYMPHOCYTES RELATIVE PERCENT 40 %  14-44   MONOCYTES RELATIVE PERCENT 7 %  4-12   EOSINOPHILS RELATIVE PERCENT 2 %  0-6   BASOPHILS RELATIVE PERCENT 1 %  0-1   NEUTROPHILS ABSOLUTE COUNT 2 53 Thousands/µL  1 85-7 62   LYMPHOCYTES ABSOLUTE COUNT 2 02 Thousands/µL  0 60-4 47   MONOCYTES ABSOLUTE COUNT 0 36 Thousand/µL  0 17-1 22   EOSINOPHILS ABSOLUTE COUNT 0 10 Thousand/µL  0 00-0 61   BASOPHILS ABSOLUTE COUNT 0 05 Thousands/µL  0 00-0 10     (1) LIPID PANEL FASTING W DIRECT LDL REFLEX [id number] 07:56AM Edmundo Evanss   Triglyceride:         Normal              <150 mg/dl       Borderline High    150-199 mg/dl       High               200-499 mg/dl       Very High          >499 mg/dl  Cholesterol:         Desirable        <200 mg/dl      Borderline High  200-239 mg/dl      High             >239 mg/dl  HDL Cholesterol:        High    >59 mg/dL      Low     <41 mg/dL  LDL Cholesterol:        Optimal          <100 mg/dl         Near Optimal     100-129 mg/dl        Above Optimal          Borderline High   130-159 mg/dl          High              160-189 mg/dl          Very High        >189 mg/dl  LDL CALCULATED:    This screening LDL is a calculated result  It does not have the accuracy of the Direct Measured LDL in the monitoring of patients with hyperlipidemia and/or statin therapy  Direct Measure LDL (RJB527) must be ordered separately in these patients       Test Name Result Flag Reference   CHOLESTEROL 145 mg/dL     LDL CHOLESTEROL CALCULATED 74 mg/dL  0-100   TRIGLYCERIDES 15 mg/dL  <=150   HDL,DIRECT 68 mg/dL H 40-60     (1) PSA (SCREEN) (Dx V76 44 Screen for Prostate Cancer) 22Mar2016 07:56AM Edmundo Reeves     Test Name Result Flag Reference   PROSTATE SPECIFIC ANTIGEN 1 8 ng/mL  0 0-4 0     (1) VITAMIN D 25-HYDROXY 22Mar2016 07:56AM Lelan Reeves     Test Name Result Flag Reference   VIT D 25-HYDROX 63 4 ng/mL  30 0-100 0     (1) HEMOGLOBIN A1C 22Mar2016 07:56AM Edmundo Evanss   5 7-6 4% impaired fasting glucose  >=6 5% diagnosis of diabetes    Falsely low levels are seen in conditions linked to short RBC life span-  hemolytic anemia, and splenomegaly  Falsely elevated levels are seen in situations where there is an increased production of RBC- receipt of erythropoietin or blood transfusions  Adopted from ADA-Clinical Practice Recommendations     Test Name Result Flag Reference   HEMOGLOBIN A1C 6 2 % H 4 0-5 6   EST  AVG   GLUCOSE 131 mg/dl

## 2018-01-17 NOTE — RESULT NOTES
Verified Results  * Dexa Scan Axial Skel (Hip, Pelvis, Spine) [id number] 10:12AM Erlin Vasquez     Test Name Result Flag Reference   Dexa Scan (Report)     No Address;   01/08/2016 1030   01/08/2016 1100   NONE     CENTRAL DXA SCAN     CLINICAL HISTORY-  65year old l  male risk factors include   personal history of thyroid cancer  TECHNIQUE- Bone densitometry was performed using a [employer] [place] C   bone densitometer  Regions of interest appear properly placed  There   are no obvious fractures or other confounding variables which could   limit the study  Degenerative or osteoarthritic changes appear to be   present on the spine image at L1 and L2 as well as L4      COMPARISON- Follow-up     RESULTS-    LUMBAR SPINE- L1-L4-   BMD 1 098 gm/cm2   T-score 0 1 and 4% less than 2012 and 5% less than 2008  Z-score +1 0     LEFT TOTAL HIP-   BMD 0 853 gm/cm2   T-score below normal, -1 2   Z-score -0 5     LEFT FEMORAL NECK-   BMD 0 635 gm/cm2   T-score below normal, -2 2 and unchanged from 2012 and 4% less than   2008, not statistically significant   Z-score -0 9     The forearm BMD is 0 740 and the T score is below normal, -1 5  The Z   score is 0 0  The Frax score in this patient identifies the risk of a major   osteoporotic fracture in the next 10 years at 8 9% and a hip fracture   risk of 2 9%  ASSESSMENT-   1  Based on the WHO classification, this study identifies moderate   femoral neck osteopenia as well as forearm osteopenia and the patient   is considered at risk for fracture  2  A daily intake of calcium of at least 1200 mg and vitamin D,   800-1000 IU, as well as weight bearing and muscle strengthening   exercise, fall prevention and avoidance of tobacco and excessive   alcohol intake as basic preventive measures are recommended  3  Repeat DXA scan in 24-36 months as clinically indicated        WHO CLASSIFICATION-   Normal (a T-score of -1 0 or higher)   Low bone mineral density (a T-score of less than -1 0 but higher than   -2 5)   Osteoporosis (a T-score of -2 5 or less)   Severe osteoporosis (a T-score of -2 5 or less with a fragility   fracture)     Thank you for allowing us the opportunity to participate in your   patient care  The expanded DEXA report will no longer be arriving in your mail   If   you desire to view the full report please contact [facility]'s medical   records or access the PACS system           Transcribed on- [id number]     KRISTI Augustin MD   Reading Radiologist- KRISTI Boles MD   Electronically Signed- KRISTI Boles MD   Released Date Time- 01/08/16 1617   ------------------------------------------------------------------------------   [id number] GRACE   [id number]^PILLO Seaman

## 2018-01-18 NOTE — PROGRESS NOTES
Chief Complaint  pt here for flu shot - GMP      Active Problems    1  Acute pulmonary embolism (415 19) (I26 99)   2  Acute UTI (599 0) (N39 0)   3  Acute UTI (599 0) (N39 0)   4  Anemia (285 9) (D64 9)   5  Aneurysm, ascending aorta (441 2) (I71 2)   6  Anxiety (300 00) (F41 9)   7  Atypical chest pain (786 59) (R07 89)   8  Benign essential hypertension (401 1) (I10)   9  Cellulitis of leg, right (682 6) (L03 115)   10  Deep vein thrombosis (DVT) of left lower extremity (453 40) (I82 402)   11  Edema of right lower extremity (782 3) (R60 0)   12  Elevated prostate specific antigen (PSA) (790 93) (R97 20)   13  Embolism and thrombosis of tibial vein, left (453 42) (I82 442)   14  Encounter for prostate cancer screening (V76 44) (Z12 5)   15  Esophageal reflux (530 81) (K21 9)   16  Glucose Intolerance (271 3)   17  Hematuria (599 70) (R31 9)   18  Hyperlipidemia (272 4) (E78 5)   19  Hypothyroidism (244 9) (E03 9)   20  Impaired glucose tolerance test (790 22) (R73 02)   21  Need for influenza vaccination (V04 81) (Z23)   22  Need for prophylactic vaccination and inoculation against influenza (V04 81) (Z23)   23  Osteoporosis (733 00) (M81 0)   24  Paroxysmal atrial fibrillation (427 31) (I48 0)   25  Postprocedural hypothyroidism (244 0) (E89 0)   26  Pre-diabetes (790 29) (R73 03)   27  Pre-procedural laboratory examination (V72 63) (Z01 812)   28  Pulmonary nodules (793 19) (R91 8)   29  Screening for depression (V79 0) (Z13 89)   30  Sick sinus syndrome (427 81) (I49 5)   31  Sinus bradycardia (427 89) (R00 1)   32  Skin irritation (709 9) (R23 8)   33  Thyroid cancer (193) (C73)   34  Vitamin D deficiency (268 9) (E55 9)    Current Meds   1  ALPRAZolam 0 25 MG Oral Tablet; take 1 tablet daily prn; Therapy: 55RVD3808 to (Evaluate:14Uid0328)  Requested for: 52Zmd7281; Last   Rx:98Dms0496 Ordered   2  Aspirin EC 81 MG Oral Tablet Delayed Release; TAKE 1 TABLET DAILY;    Therapy: (Recorded:19Sep2017) to Recorded   3  Azithromycin 250 MG Oral Tablet; TAKE 2 TABLETS ON DAY 1 THEN TAKE 1 TABLET A   DAY FOR 4 DAYS; Therapy: 36TBH6179 to (Last Lorseth Young)  Requested for: 13QZV0336 Ordered   4  Levothyroxine Sodium 175 MCG Oral Tablet; Take 1 tablet daily; Therapy: 06WHM9033 to (Last Rx:12Jun2017)  Requested for: 12Jun2017 Ordered   5  Lisinopril 20 MG Oral Tablet; Take 1 tablet daily; Therapy: 61BXK4764 to (Evaluate:43Wks8770)  Requested for: 42Pwf3408; Last   Rx:87Qar7064 Ordered   6  Metoprolol Succinate ER 25 MG Oral Tablet Extended Release 24 Hour; take one half   tablet daily; Therapy: 05ZKP9751 to (Evaluate:62Lrs8718)  Requested for: 99Wlh5679; Last   Rx:18Aky9441 Ordered   7  Pravastatin Sodium 10 MG Oral Tablet; Take 1 tablet daily; Therapy: 34FNE8629 to (Evaluate:17Bpk2540)  Requested for: 40Usp3144; Last   Rx:84Xfz1289 Ordered   8  Xarelto 20 MG Oral Tablet; TAKE ONE TABLET DAILY AT THE SAME TIME EACH DAY    Requested for: 66Gmi6662; Last Rx:75Nld4536 Ordered    Allergies    1  Penicillins   2  Percocet TABS    Plan  Need for influenza vaccination    · Fluzone High-Dose 0 5 ML Intramuscular Suspension Prefilled Syringe    Future Appointments    Date/Time Provider Specialty Site   11/06/2017 11:30 AM PILLO Fuentes   Endocrinology [facility] ENDOCRINOLOGY     Signatures   Electronically signed by : Kirsten Burks DO; Oct  4 2017 12:20PM EST                       (Author)

## 2018-01-22 VITALS — BODY MASS INDEX: 27.47 KG/M2 | HEIGHT: 69 IN | WEIGHT: 185.5 LBS

## 2018-01-22 VITALS — DIASTOLIC BLOOD PRESSURE: 62 MMHG | SYSTOLIC BLOOD PRESSURE: 120 MMHG | HEART RATE: 65 BPM

## 2018-02-15 DIAGNOSIS — I26.99 OTHER PULMONARY EMBOLISM WITHOUT ACUTE COR PULMONALE, UNSPECIFIED CHRONICITY (HCC): Primary | ICD-10-CM

## 2018-03-06 ENCOUNTER — HOSPITAL ENCOUNTER (OUTPATIENT)
Dept: CT IMAGING | Facility: HOSPITAL | Age: 73
Discharge: HOME | End: 2018-03-06
Payer: MEDICARE

## 2018-03-06 DIAGNOSIS — I71.2 THORACIC AORTIC ANEURYSM WITHOUT RUPTURE (HCC): ICD-10-CM

## 2018-03-06 PROCEDURE — 71250 CT THORAX DX C-: CPT

## 2018-03-13 ENCOUNTER — OFFICE VISIT (OUTPATIENT)
Dept: CARDIAC SURGERY | Facility: CLINIC | Age: 73
End: 2018-03-13
Payer: MEDICARE

## 2018-03-13 VITALS
HEART RATE: 58 BPM | BODY MASS INDEX: 25.73 KG/M2 | DIASTOLIC BLOOD PRESSURE: 80 MMHG | SYSTOLIC BLOOD PRESSURE: 126 MMHG | TEMPERATURE: 97.1 F | WEIGHT: 183.8 LBS | RESPIRATION RATE: 16 BRPM | HEIGHT: 71 IN

## 2018-03-13 DIAGNOSIS — I71.2 THORACIC AORTIC ANEURYSM WITHOUT RUPTURE (HCC): ICD-10-CM

## 2018-03-13 DIAGNOSIS — I71.2 ANEURYSM, ASCENDING AORTA (HCC): Primary | ICD-10-CM

## 2018-03-13 PROBLEM — I82.442: Status: ACTIVE | Noted: 2017-03-13

## 2018-03-13 PROBLEM — R91.8 PULMONARY NODULES: Status: ACTIVE | Noted: 2017-03-27

## 2018-03-13 PROCEDURE — 99213 OFFICE O/P EST LOW 20 MIN: CPT | Performed by: NURSE PRACTITIONER

## 2018-03-13 NOTE — PROGRESS NOTES
Aortic Clinic  Chiquita Childers  74 y o  male MRN: [id number]    Physician Requesting Consult: MITCH Magallanes DO    Reason for Consult / Principal Problem: Ascending aortic aneurysm    History of Present Illness: Chiquita Childers  is a 74y o  year old male who presents today for ongoing surveillance of his ascending aortic aneurysm  This was initially identified in August 2016 on CT scan performed to evaluate SOB and measured 43 x 45 mm at maximal diameter  He underwent an echocardiogram September 2016 which demonstrated a trileaflet aortic valve with normal function  His initial evaluation in our aortic clinic was in September 2016 and then returned for a 6 month f/u in March 2017 with a repeat CT scan  This scan demonstrated stable findings with maximal ascending aortic diameter measuring 45 mm  He returns today for a 1 year follow up  Recent repeat CT scan demonstrates stable findings with maximal diameter of the ascending aorta at 43 mm, without dissection  Priyanka Dia states he is doing well  He denies chest pain, SOB, palpitations, lightheadedness, fatigue, or syncope  He continues to work part time for [employer] and exercises every day for 2 hours (1 hour in the AM, 1 hour in the PM)  His wife is a retired nurse who checks his BP daily  His BP ranges between 102/62 - 130/78       Past Medical History:  Past Medical History:   Diagnosis Date    Arthritis     Atrial fibrillation ([address] )     Cancer (HCC)     skin, thyroid     Disease of thyroid gland     H/O malignant neoplasm of thyroid     Hypertension     Osteopenia     Psychiatric disorder     Pulmonary embolism (HCC)          Past Surgical History:   Past Surgical History:   Procedure Laterality Date    APPENDECTOMY      LEG SURGERY      THYROID SURGERY           Family History:  Family History   Problem Relation Age of Onset    Coronary artery disease Father     Heart attack Father    Kristie Mare Stroke Mother     Hypertension Mother          Social History:    History   Alcohol Use No     History   Drug Use No     History   Smoking Status    Former Smoker   Smokeless Tobacco    Never Used       Marital Status:     Home Medications:   Prior to Admission medications    Medication Sig Start Date End Date Taking? Authorizing Provider   ALPRAZolam Patrizia Vamshi) 0 25 mg tablet Take 0 25 mg by mouth daily at bedtime as needed for anxiety  Historical Provider, MD   aspirin 325 mg tablet Take 325 mg by mouth daily  Historical Provider, MD   levothyroxine 175 mcg tablet Take 175 mcg by mouth daily  Historical Provider, MD   lisinopril (ZESTRIL) 20 mg tablet Take 20 mg by mouth daily  Historical Provider, MD   metoprolol succinate (TOPROL-XL) 25 mg 24 hr tablet Take 12 5 mg by mouth daily  Historical Provider, MD   pravastatin (PRAVACHOL) 10 mg tablet Take 10 mg by mouth daily  Historical Provider, MD   rivaroxaban (XARELTO) 15 mg tablet Take 1 tablet by mouth 2 (two) times a day for 21 days Take medication w/food  3/7/17 3/28/17  Marcy Blecher MD   rivaroxaban (XARELTO) 20 mg tablet Take 1 tablet (20 mg total) by mouth daily 2/15/18   Jared Shukla, DO       Allergies:   Allergies   Allergen Reactions    Penicillins     Percocet [Oxycodone-Acetaminophen]        Review of Systems:   Review of Systems - History obtained from chart review and the patient  General ROS: negative for - chills, fatigue, fever, sleep disturbance or weight gain  Psychological ROS: negative  ENT ROS: negative  Hematological and Lymphatic ROS: negative for - bleeding problems, bruising or jaundice  Respiratory ROS: no cough, shortness of breath, or wheezing  Cardiovascular ROS: no chest pain or dyspnea on exertion  Gastrointestinal ROS: no abdominal pain, change in bowel habits, or black or bloody stools  Musculoskeletal ROS: positive for - pain in knee - both  Neurological ROS: no TIA or stroke symptoms    Vital Signs:   Vitals:    03/13/18 0949 03/13/18 0955   BP: 124/84 126/80   BP Location: Right arm Left arm   Patient Position: Sitting Sitting   Cuff Size: Standard Standard   Pulse: 58    Resp: 16    Temp: (!) 97 1 °F (36 2 °C)    TempSrc: Oral    Weight: 83 4 kg (183 lb 12 8 oz)    Height: 5' 10 8" (1 798 m)        Physical Exam:  General:  HEENT/NECK:  PERRLA  No jugular venous distention  Cardiac:Bradycardic, regular rhythm, 1/6 systolic murmur at apex  Carotid arteries: 2+ pulses, bilaterally, no bruits  Pulmonary:  Breath sounds clear bilaterally  Abdomen:  Non-tender, Non-distended  Positive bowel sounds  Lower extremities: Extremities warm/dry  Radial/PT/DP pulses 2+ bilaterally  Trace edema B/L  Neuro: Alert and oriented X 3  Sensation is grossly intact  No focal deficits  Skin: Warm/Dry, without rashes or lesions  Lab Results:                 Lab Results   Component Value Date    HGBA1C 5 9 06/20/2017     No results found for: CKTOTAL, CKMB, CKMBINDEX, TROPONINI    Imaging Studies:     CT Chest: 3/6/18: IMPRESSION:     Stable size of ascending thoracic aortic aneurysm measuring 4 3 cm in maximum transverse dimension        I have personally reviewed pertinent reports  Assessment:  Patient Active Problem List    Diagnosis Date Noted    Pulmonary nodules 03/27/2017    Embolism and thrombosis of tibial vein, left ([facility] ) 03/13/2017    Aneurysm, ascending aorta ([facility] ) 09/01/2016    Thyroid cancer ([facility] ) 02/12/2014    Anxiety 12/17/2013    Sick sinus syndrome (HCC) 07/03/2013    Sinus bradycardia 07/03/2013    Paroxysmal atrial fibrillation ([facility] ) 07/02/2013    Pre-diabetes 05/17/2013    Vitamin D deficiency 04/05/2013    Postprocedural hypothyroidism 02/19/2013    Esophageal reflux 08/17/2012    Osteoporosis 06/08/2012    Benign essential hypertension 05/03/2012    Hyperlipidemia 05/03/2012     Ascending aortic aneurysm;  Ongoing ascending aortic replacement surveillance    Plan:    CT imaging performed prior to this visit demonstrates the ascending aorta measuring 43 mm in size at its greatest diameter  These are stable findings as compared to his prior CT scans  These findings were confirmed and shared with the patient today  He does not meet surgical criteria for repair, therefore, since he remains asymptomatic with non-contibutory family history, follow-up monitoring is the treatment plan  Arrangements have been made for future surveillance to be completed with CT chest, without contrast, in 1 year with a follow up visit ion our aortic clinic  Kellyibrahima Jordanw  was comfortable with my recommendations, and his questions were answered to his satisfaction  Thank you for allowing us to participate in the care of this patient  Aortic Aneurysm Instructions were provided to the patient as follows:    1  No lifting more than 50 pounds  Regular aerobic exercise permitted and recommended  2  Maintain a controlled blood pressure with a goal of less than 140/80  3  Follow up in Aortic Clinic as recommended with radiology follow up as instructed  4  Report to the ER or call 911 immediately with the following signs / symptoms: sudden onset of back pain, chest pain or shortness of breath  5  Tobacco cessation discussed      SIGNATURE: WILLIAM Hinton  DATE: March 13, 2018  TIME: 9:56 AM

## 2018-04-26 DIAGNOSIS — D64.9 ANEMIA: ICD-10-CM

## 2018-04-26 DIAGNOSIS — F41.9 ANXIETY DISORDER: ICD-10-CM

## 2018-04-26 DIAGNOSIS — E03.9 HYPOTHYROIDISM: ICD-10-CM

## 2018-04-30 ENCOUNTER — TELEPHONE (OUTPATIENT)
Dept: FAMILY MEDICINE CLINIC | Facility: CLINIC | Age: 73
End: 2018-04-30

## 2018-04-30 ENCOUNTER — TRANSCRIBE ORDERS (OUTPATIENT)
Dept: FAMILY MEDICINE CLINIC | Facility: CLINIC | Age: 73
End: 2018-04-30

## 2018-04-30 DIAGNOSIS — R97.20 ELEVATED PROSTATE SPECIFIC ANTIGEN (PSA): ICD-10-CM

## 2018-04-30 DIAGNOSIS — E78.5 HYPERLIPIDEMIA, UNSPECIFIED HYPERLIPIDEMIA TYPE: Primary | ICD-10-CM

## 2018-04-30 DIAGNOSIS — C73 MALIGNANT NEOPLASM OF THYROID GLAND (HCC): ICD-10-CM

## 2018-04-30 DIAGNOSIS — E55.9 VITAMIN D DEFICIENCY DISEASE: ICD-10-CM

## 2018-04-30 NOTE — TELEPHONE ENCOUNTER
Patient called and scheduled his medicare wellness on 05/17/2018, asking to  a bw script on 05/03/2018

## 2018-05-03 ENCOUNTER — TRANSCRIBE ORDERS (OUTPATIENT)
Dept: LAB | Facility: CLINIC | Age: 73
End: 2018-05-03

## 2018-05-03 ENCOUNTER — APPOINTMENT (OUTPATIENT)
Dept: LAB | Facility: CLINIC | Age: 73
End: 2018-05-03
Payer: MEDICARE

## 2018-05-03 DIAGNOSIS — F41.9 ANXIETY DISORDER: ICD-10-CM

## 2018-05-03 DIAGNOSIS — R97.20 ELEVATED PROSTATE SPECIFIC ANTIGEN (PSA): ICD-10-CM

## 2018-05-03 DIAGNOSIS — D64.9 ANEMIA: ICD-10-CM

## 2018-05-03 DIAGNOSIS — C73 MALIGNANT NEOPLASM OF THYROID GLAND (HCC): ICD-10-CM

## 2018-05-03 DIAGNOSIS — E55.9 VITAMIN D DEFICIENCY DISEASE: ICD-10-CM

## 2018-05-03 DIAGNOSIS — E78.5 HYPERLIPIDEMIA, UNSPECIFIED HYPERLIPIDEMIA TYPE: ICD-10-CM

## 2018-05-03 DIAGNOSIS — E03.9 HYPOTHYROIDISM: ICD-10-CM

## 2018-05-03 LAB
25(OH)D3 SERPL-MCNC: 72.3 NG/ML (ref 30–100)
ALBUMIN SERPL BCP-MCNC: 3.9 G/DL (ref 3.5–5)
ALP SERPL-CCNC: 63 U/L (ref 46–116)
ALT SERPL W P-5'-P-CCNC: 24 U/L (ref 12–78)
ANION GAP SERPL CALCULATED.3IONS-SCNC: 9 MMOL/L (ref 4–13)
AST SERPL W P-5'-P-CCNC: 23 U/L (ref 5–45)
BILIRUB SERPL-MCNC: 1.43 MG/DL (ref 0.2–1)
BUN SERPL-MCNC: 10 MG/DL (ref 5–25)
CALCIUM SERPL-MCNC: 8.6 MG/DL (ref 8.3–10.1)
CHLORIDE SERPL-SCNC: 99 MMOL/L (ref 100–108)
CHOLEST SERPL-MCNC: 129 MG/DL (ref 50–200)
CO2 SERPL-SCNC: 25 MMOL/L (ref 21–32)
CREAT SERPL-MCNC: 0.85 MG/DL (ref 0.6–1.3)
ERYTHROCYTE [DISTWIDTH] IN BLOOD BY AUTOMATED COUNT: 14.4 % (ref 11.6–15.1)
GFR SERPL CREATININE-BSD FRML MDRD: 86 ML/MIN/1.73SQ M
GLUCOSE P FAST SERPL-MCNC: 103 MG/DL (ref 65–99)
HCT VFR BLD AUTO: 39.1 % (ref 36.5–49.3)
HDLC SERPL-MCNC: 58 MG/DL (ref 40–60)
HGB BLD-MCNC: 13.6 G/DL (ref 12–17)
LDLC SERPL CALC-MCNC: 65 MG/DL (ref 0–100)
MCH RBC QN AUTO: 30 PG (ref 26.8–34.3)
MCHC RBC AUTO-ENTMCNC: 34.8 G/DL (ref 31.4–37.4)
MCV RBC AUTO: 86 FL (ref 82–98)
NONHDLC SERPL-MCNC: 71 MG/DL
PLATELET # BLD AUTO: 250 THOUSANDS/UL (ref 149–390)
PMV BLD AUTO: 10.7 FL (ref 8.9–12.7)
POTASSIUM SERPL-SCNC: 4.4 MMOL/L (ref 3.5–5.3)
PROT SERPL-MCNC: 7.1 G/DL (ref 6.4–8.2)
RBC # BLD AUTO: 4.54 MILLION/UL (ref 3.88–5.62)
SODIUM SERPL-SCNC: 133 MMOL/L (ref 136–145)
T4 FREE SERPL-MCNC: 1.19 NG/DL (ref 0.76–1.46)
TRIGL SERPL-MCNC: 31 MG/DL
TSH SERPL DL<=0.05 MIU/L-ACNC: 2.59 UIU/ML (ref 0.36–3.74)
TSH SERPL DL<=0.05 MIU/L-ACNC: 2.61 UIU/ML
WBC # BLD AUTO: 4.93 THOUSAND/UL (ref 4.31–10.16)

## 2018-05-03 PROCEDURE — 80061 LIPID PANEL: CPT

## 2018-05-03 PROCEDURE — 80053 COMPREHEN METABOLIC PANEL: CPT

## 2018-05-03 PROCEDURE — 36415 COLL VENOUS BLD VENIPUNCTURE: CPT

## 2018-05-03 PROCEDURE — 84153 ASSAY OF PSA TOTAL: CPT

## 2018-05-03 PROCEDURE — 85027 COMPLETE CBC AUTOMATED: CPT

## 2018-05-03 PROCEDURE — 82306 VITAMIN D 25 HYDROXY: CPT

## 2018-05-03 PROCEDURE — 84154 ASSAY OF PSA FREE: CPT

## 2018-05-03 PROCEDURE — 84443 ASSAY THYROID STIM HORMONE: CPT

## 2018-05-03 PROCEDURE — 84439 ASSAY OF FREE THYROXINE: CPT

## 2018-05-04 LAB
PSA FREE MFR SERPL: 23.6 %
PSA FREE SERPL-MCNC: 0.33 NG/ML
PSA SERPL-MCNC: 1.4 NG/ML (ref 0–4)

## 2018-05-17 ENCOUNTER — OFFICE VISIT (OUTPATIENT)
Dept: FAMILY MEDICINE CLINIC | Facility: CLINIC | Age: 73
End: 2018-05-17
Payer: MEDICARE

## 2018-05-17 VITALS
DIASTOLIC BLOOD PRESSURE: 88 MMHG | HEIGHT: 69 IN | SYSTOLIC BLOOD PRESSURE: 132 MMHG | WEIGHT: 178.8 LBS | BODY MASS INDEX: 26.48 KG/M2

## 2018-05-17 DIAGNOSIS — R73.03 PREDIABETES: ICD-10-CM

## 2018-05-17 DIAGNOSIS — I71.2 ANEURYSM, ASCENDING AORTA (HCC): ICD-10-CM

## 2018-05-17 DIAGNOSIS — E03.9 HYPOTHYROIDISM, UNSPECIFIED TYPE: ICD-10-CM

## 2018-05-17 DIAGNOSIS — F41.9 ANXIETY: ICD-10-CM

## 2018-05-17 DIAGNOSIS — Z00.00 HEALTH CARE MAINTENANCE: ICD-10-CM

## 2018-05-17 DIAGNOSIS — I10 BENIGN ESSENTIAL HYPERTENSION: Primary | ICD-10-CM

## 2018-05-17 DIAGNOSIS — E78.5 HYPERLIPIDEMIA, UNSPECIFIED HYPERLIPIDEMIA TYPE: ICD-10-CM

## 2018-05-17 PROCEDURE — G0439 PPPS, SUBSEQ VISIT: HCPCS | Performed by: FAMILY MEDICINE

## 2018-05-17 PROCEDURE — 99214 OFFICE O/P EST MOD 30 MIN: CPT | Performed by: FAMILY MEDICINE

## 2018-05-17 RX ORDER — ASPIRIN 81 MG/1
81 TABLET ORAL DAILY
COMMUNITY

## 2018-05-17 RX ORDER — METOPROLOL SUCCINATE 25 MG/1
12.5 TABLET, EXTENDED RELEASE ORAL DAILY
COMMUNITY
End: 2018-10-22 | Stop reason: SDUPTHER

## 2018-05-17 NOTE — PROGRESS NOTES
Assessment/Plan:  Chief Complaint   Patient presents with    Medicare Wellness Visit     blue folder given     Patient Instructions   Here for medicare wellness and routine medical f-up, overall doing well  Reviewed labs showing stable thyroid and lipids  Fasting Blood sugar stable  Anxiety stable, BP stable  Reviewed home BP readings which are in range  Advanced directives UTD  Recheck medicare wellness once yearly  Recheck in 6 months for BP check and f-up to thyroid, and prediabetes and hyperlipidemia  Continue Xarelto as directed and did f-up with Heme/Onc for hx of DVT and PE and pulmonary nodules  Monitor BP and take all meds as directed  HTN stable  F-up with Cardiology and aortic aneurysm clinic as directed for ascending aortic aneurysm  Recheck labs in 6 months for hx of hypothyroidism  Anxiety and hyperlipidemia stable  Take vitamin D as directed and monitor prediabetes  Follow strict low sugar and low cholesterol diet as directed  Diagnoses and all orders for this visit:    Benign essential hypertension  -     Comprehensive metabolic panel; Future    Health care maintenance    Anxiety    Hyperlipidemia, unspecified hyperlipidemia type  -     Comprehensive metabolic panel; Future    Hypothyroidism, unspecified type  -     TSH, 3rd generation; Future  -     T4, free    Aneurysm, ascending aorta (HCC)    Prediabetes  -     Comprehensive metabolic panel; Future  -     HEMOGLOBIN A1C W/ EAG ESTIMATION    Other orders  -     aspirin (ECOTRIN LOW STRENGTH) 81 mg EC tablet; Take 81 mg by mouth daily  -     metoprolol succinate (TOPROL-XL) 25 mg 24 hr tablet; Take 25 mg by mouth daily          Subjective:      Patient ID: Ollen Homans  is a 74 y o  male  Here for routine medical care and medicare wellness  No complaints today  Advanced directives UTD  No cp or sob, or ha   Last CT scan chest this year  showed Stable size of ascending thoracic aortic aneurysm measuring 4 3 cm in maximum transverse dimension  The following portions of the patient's history were reviewed and updated as appropriate: allergies, current medications, past family history, past medical history, past social history, past surgical history and problem list     Review of Systems   Constitutional: Negative  HENT: Negative  Eyes: Negative  Respiratory: Negative  Cardiovascular: Negative  Gastrointestinal: Negative  Endocrine: Negative  Genitourinary: Negative  Musculoskeletal: Negative  Skin: Negative  Allergic/Immunologic: Negative  Neurological: Negative  Hematological: Negative  Psychiatric/Behavioral: Negative  Objective:      /88   Ht 5' 8 5" (1 74 m)   Wt 81 1 kg (178 lb 12 8 oz)   BMI 26 79 kg/m²          Physical Exam   Constitutional: He is oriented to person, place, and time  He appears well-developed and well-nourished  HENT:   Head: Normocephalic and atraumatic  Right Ear: External ear normal    Left Ear: External ear normal    Nose: Nose normal    Mouth/Throat: Oropharynx is clear and moist    Eyes: Conjunctivae and EOM are normal  Pupils are equal, round, and reactive to light  Neck: Normal range of motion  Neck supple  Cardiovascular: Normal rate, regular rhythm, normal heart sounds and intact distal pulses  Pulmonary/Chest: Effort normal and breath sounds normal    Musculoskeletal: Normal range of motion  Neurological: He is alert and oriented to person, place, and time  He has normal reflexes  Skin: Skin is warm and dry  Psychiatric: He has a normal mood and affect  His behavior is normal          HPI:  Nayla Reardon  is a 74 y o  male here for his Subsequent Wellness Visit      Patient Active Problem List   Diagnosis    Aneurysm, ascending aorta (HCC)    Anxiety    Benign essential hypertension    Esophageal reflux    Hyperlipidemia    Paroxysmal atrial fibrillation (HCC)    Postprocedural hypothyroidism    Pulmonary nodules    Sick sinus syndrome (HCC)    Sinus bradycardia    Thyroid cancer (HCC)    Vitamin D deficiency    Pre-diabetes    Osteoporosis    Embolism and thrombosis of tibial vein, left (HCC)     Past Medical History:   Diagnosis Date    Arthritis     neck    Atrial fibrillation ([facility] )     last assessed: 7/26/2017    Cancer ([facility] )     skin, thyroid     Disease of thyroid gland     Disorder of epididymis     H/O malignant neoplasm of thyroid     Hyperlipidemia     last assessed: 6/29/2017    Hypertension     Osteopenia     Psychiatric disorder     Pulmonary embolism ([facility] )     last assessed: 7/26/2017     Past Surgical History:   Procedure Laterality Date    APPENDECTOMY      COLONOSCOPY      complete    CYSTOSCOPY  02/01/2016    diagnostic    LEG SURGERY      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     Family History   Problem Relation Age of Onset    Coronary artery disease Father     Heart attack Father      acute myocardial infarction    Stroke Mother     Hypertension Mother      History   Smoking Status    Former Smoker   Smokeless Tobacco    Never Used     Comment: quit years ago     History   Alcohol Use No      History   Drug Use No     /88   Ht 5' 8 5" (1 74 m)   Wt 81 1 kg (178 lb 12 8 oz)   BMI 26 79 kg/m²       Current Outpatient Prescriptions   Medication Sig Dispense Refill    ALPRAZolam (XANAX) 0 25 mg tablet Take 0 25 mg by mouth daily at bedtime as needed for anxiety   aspirin (ECOTRIN LOW STRENGTH) 81 mg EC tablet Take 81 mg by mouth daily      levothyroxine 175 mcg tablet Take 175 mcg by mouth daily   lisinopril (ZESTRIL) 20 mg tablet Take 20 mg by mouth daily   metoprolol succinate (TOPROL-XL) 25 mg 24 hr tablet Take 25 mg by mouth daily      pravastatin (PRAVACHOL) 10 mg tablet Take 10 mg by mouth daily        rivaroxaban (XARELTO) 20 mg tablet Take 1 tablet (20 mg total) by mouth daily 90 tablet 1     No current facility-administered medications for this visit  Allergies   Allergen Reactions    Penicillins     Percocet [Oxycodone-Acetaminophen]      Immunization History   Administered Date(s) Administered    H1N1, All Formulations 01/08/2010    Influenza 12/08/2004    Influenza Split High Dose Preservative Free IM 09/29/2015, 09/16/2016, 10/04/2017       Patient Care Team:  Cait Goins, DO as PCP - Leann Billy MD Roslyn Sinner, MD Ballard Holland, MD      Medicare Screening Tests and Risk Assessments:  AWV Clinical     ISAR:   Previous hospitalizations?:  Yes       Once in a Lifetime Medicare Screening:       Medicare Screening Tests and Risk Assessment:   AAA Risk Assessment    Osteoporosis Risk Assessment    HIV Risk Assessment        Drug and Alcohol Use:   Tobacco use    Cigarettes:  never smoker    Tobacco use duration    Tobacco Cessation Readiness    Alcohol use    Alcohol use:  rare use    Alcohol Treatment Readiness   Illicit Drug Use    Drug use:  never        Diet & Exercise:   Diet   What is your diet?:  Regular   How many servings a day of the following:   Fruits and Vegetables:  1-2 Meat:  1-2   Whole Grains:  1 Simple Carbs:  1   Dairy:  0 Soda:  0   Coffee:  5 or more Tea:  0   Exercise    Do you currently exercise?:  yes       Cognitive Impairment Screening:   Anxiety screenings preformed:   Yes Anxiety screen score:  0   Depression screening preformed:  Yes     PHQ-9 Depression scale score:  0   Cognitive Impairment Screening    Do you have difficulty learning or retaining new information?:  No Do you have difficulty handling new tasks?:  No   Do you have difficulty with reasoning?:  No Do you have difficulty with spatial ability and orientation?:  No   Do you have difficulty with language?:  No Do you have difficulty with behavior?:  No       Functional Ability/Level of Safety:   Hearing    Hearing difficulties:  No Bilateral:  normal Hearing aid:  No    Hearing Impairment Assessment    Hearing status:  No impairment   Do your family members ever complain that you turn on the radio or T V  too loudly?:  No Do you find that other people have to repeat themselves when talking to you?:  No   Do you have difficulty hearing while talking on the phone?:  No Has anyone ever told you that you are speaking too loudly when talking with them?:  No   Do you have trouble hearing the doorbell or phone ringing?:  No Do you have difficulty hearing such that you feel frustrated talking to people?:  No   Do you feel sad because you cannot hear well?:  No Do you feel inconvienced due to your hearing problem?:  No   Do you think you would be a happier person if you could hear better?:  No Would you be willing to go for a hearing aid fitting if suggested?:  No   Current Activities    Help needed with the folllowing:    Using the phone:  No Transportation:  No   Shopping:  No Preparing Meals:  No   Doing Housework:  No Doing Laundry:  No   Managing Medications:  No Managing Money:  No   ADL    Fall Risk   Have you fallen in the last 12 months?:  No Are you unsteady on your feet?:  No    Are you taking any medications that may cause fatigue or dizziness?:  No    Do you rush to the bathroom potentially risking a fall?:  No   Injury History       Home Safety:   Are there hazards in your environment?:  No   If you fell, would you need help to get back up from the ground?:  No Do you have problems or concerns getting in/out of a bed, chair, tub, or toilet?:  No   Do you feel unsteady when walking?:  No Is your activity limited by pain?:  No   Do you have handrails and grab-bars in the home?:  Yes Are emergency numbers kept by the phone and regularly updated?:  Yes   Are you and/or family members aware of the dangers of smoking in bed?:  Yes    Do you have working smoke alarms and fire extinguisher?:  Yes Do all household members know how to use them?:  Yes   Have you left the stove on unsupervised?:  Yes    Home Safety Risk Factors   Unfamilar with surroundings:  No Uneven floors:  No   Stairs or handrail saftey risk:  No Loose rugs:  No   Household clutter:  No Poor household lighting:  No   No grab bars in bathroom:  No Further evaluation needed:  No       Advanced Directives:   Advanced Directives    Living Will:  Yes    Advanced directive:  Yes    Patient's End of Life Decisions        Urinary Incontinence:   Do you have urinary incontinence?:  No Do you have incomplete emptying?:  No   Do you urinate frequently?:  No Do you have urinary urgency?:  No   Do you have urinary hesitancy?:  No Do you have dysuria (painful and/or difficult urination)?:  No   Do you have nocturia (waking up to urinate)?:  No Do you strain when urinating (have to push to urinate)?:  No   Do you have a weak stream when urinating?:  No Do you have intermittent streaming when urinating?:  No   Do you dribble urine after finishing?:  No        Glaucoma:            Provider Screening    No data filed        No exam data present  Reviewed Updated [facility] Prior Wellness Visits:   Last Medicare wellness visit information was reviewed, patient interviewed , no change since last AWVyes  Last Medicare wellness visit information was reviewed, patient interviewed and updates made to the record today yes    Assessment and Plan:  1  Benign essential hypertension  Comprehensive metabolic panel   2  Health care maintenance     3  Anxiety     4  Hyperlipidemia, unspecified hyperlipidemia type  Comprehensive metabolic panel   5  Hypothyroidism, unspecified type  TSH, 3rd generation    T4, free   6  Aneurysm, ascending aorta (HCC)     7   Prediabetes  Comprehensive metabolic panel    HEMOGLOBIN A1C W/ EAG ESTIMATION       Health Maintenance Due   Topic Date Due    DTaP,Tdap,and Td Vaccines (1 - Tdap) 10/11/1964    ABDOMINAL AORTIC ANEURYSM (AAA) SCREEN  10/11/2008    PNEUMOCOCCAL POLYSACCHARIDE VACCINE AGE 72 AND OVER  10/11/2008    GLAUCOMA SCREENING 67+ YR  10/11/2010

## 2018-05-17 NOTE — PATIENT INSTRUCTIONS
Here for medicare wellness and routine medical f-up, overall doing well  Reviewed labs showing stable thyroid and lipids  Fasting Blood sugar stable  Anxiety stable, BP stable  Reviewed home BP readings which are in range  Advanced directives UTD  Recheck medicare wellness once yearly  Recheck in 6 months for BP check and f-up to thyroid, and prediabetes and hyperlipidemia  Continue Xarelto as directed and did f-up with Heme/Onc for hx of DVT and PE and pulmonary nodules  Monitor BP and take all meds as directed  HTN stable  F-up with Cardiology and aortic aneurysm clinic as directed for ascending aortic aneurysm  Recheck labs in 6 months for hx of hypothyroidism  Anxiety and hyperlipidemia stable  Take vitamin D as directed and monitor prediabetes  Follow strict low sugar and low cholesterol diet as directed

## 2018-07-20 ENCOUNTER — OFFICE VISIT (OUTPATIENT)
Dept: CARDIOLOGY CLINIC | Facility: CLINIC | Age: 73
End: 2018-07-20
Payer: MEDICARE

## 2018-07-20 VITALS
SYSTOLIC BLOOD PRESSURE: 126 MMHG | WEIGHT: 178.4 LBS | HEIGHT: 69 IN | BODY MASS INDEX: 26.42 KG/M2 | HEART RATE: 46 BPM | DIASTOLIC BLOOD PRESSURE: 78 MMHG

## 2018-07-20 DIAGNOSIS — I71.2 ANEURYSM, ASCENDING AORTA (HCC): ICD-10-CM

## 2018-07-20 DIAGNOSIS — I10 BENIGN ESSENTIAL HYPERTENSION: ICD-10-CM

## 2018-07-20 DIAGNOSIS — E78.2 MIXED HYPERLIPIDEMIA: ICD-10-CM

## 2018-07-20 DIAGNOSIS — I48.0 PAROXYSMAL ATRIAL FIBRILLATION (HCC): Primary | ICD-10-CM

## 2018-07-20 DIAGNOSIS — R00.1 SINUS BRADYCARDIA: ICD-10-CM

## 2018-07-20 PROCEDURE — 99213 OFFICE O/P EST LOW 20 MIN: CPT | Performed by: INTERNAL MEDICINE

## 2018-07-20 PROCEDURE — 93000 ELECTROCARDIOGRAM COMPLETE: CPT | Performed by: INTERNAL MEDICINE

## 2018-07-20 NOTE — PROGRESS NOTES
Cardiology Follow Up    Louie Harper  1945  [id number]  [address] [id number] [phone]    Reason for visit:   Nine month follow-up for paroxysmal atrial fibrillation, sick sinus syndrome with sinus bradycardia, hypertension and hyperlipidemia  Also followed for thoracic aortic aneurysm by CT surgery  Also has history of DVT      1  Paroxysmal atrial fibrillation (HCC)  POCT ECG   2  Sinus bradycardia     3  Benign essential hypertension  POCT ECG   4  Aneurysm, ascending aorta (HCC)     5  Mixed hyperlipidemia         Interval History: Since his last visit he denies SOB, CP or palpitations  He denies edema or dizziness  Patient Active Problem List   Diagnosis    Aneurysm, ascending aorta (HCC)    Anxiety    Benign essential hypertension    Esophageal reflux    Hyperlipidemia    Paroxysmal atrial fibrillation (HCC)    Postprocedural hypothyroidism    Pulmonary nodules    Sick sinus syndrome (HCC)    Sinus bradycardia    [facility]    Vitamin D deficiency    Pre-diabetes    Osteoporosis    Embolism and thrombosis of tibial vein, left (HCC)     Past Medical History:   Diagnosis Date    Arthritis     neck    Atrial fibrillation ([facility] )     last assessed: 7/26/2017    Cancer ([facility] )     skin, thyroid     Disease of thyroid gland     Disorder of epididymis     H/O malignant neoplasm of thyroid     Hyperlipidemia     last assessed: 6/29/2017    Hypertension     Osteopenia     Psychiatric disorder     Pulmonary embolism ([facility] )     last assessed: 7/26/2017     Social History     Social History    Marital status: /Civil Union     Spouse name: N/A    Number of children: N/A    Years of education: N/A     Occupational History    Not on file       Social History Main Topics    Smoking status: Former Smoker    Smokeless tobacco: Never Used      Comment: quit years ago    Alcohol use No    Drug use: No    Sexual activity: Not on file     Other Topics Concern    Not on file     Social History Narrative    No narrative on file      Family History   Problem Relation Age of Onset    Coronary artery disease Father     Heart attack Father         acute myocardial infarction    Stroke Mother     Hypertension Mother      Past Surgical History:   Procedure Laterality Date    APPENDECTOMY      COLONOSCOPY      complete    CYSTOSCOPY  02/01/2016    diagnostic    LEG SURGERY      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       Current Outpatient Prescriptions:     ALPRAZolam (XANAX) 0 25 mg tablet, Take 0 25 mg by mouth daily at bedtime as needed for anxiety  , Disp: , Rfl:     aspirin (ECOTRIN LOW STRENGTH) 81 mg EC tablet, Take 81 mg by mouth daily, Disp: , Rfl:     levothyroxine 175 mcg tablet, Take 175 mcg by mouth daily  , Disp: , Rfl:     lisinopril (ZESTRIL) 20 mg tablet, Take 20 mg by mouth daily  , Disp: , Rfl:     metoprolol succinate (TOPROL-XL) 25 mg 24 hr tablet, Take 12 5 mg by mouth daily  , Disp: , Rfl:     pravastatin (PRAVACHOL) 10 mg tablet, Take 10 mg by mouth daily  , Disp: , Rfl:     rivaroxaban (XARELTO) 20 mg tablet, Take 1 tablet (20 mg total) by mouth daily, Disp: 90 tablet, Rfl: 1  Allergies   Allergen Reactions    Penicillins     Percocet [Oxycodone-Acetaminophen]        Review of Systems:  Review of Systems   Respiratory: Negative for shortness of breath  Cardiovascular: Negative for chest pain, palpitations and leg swelling  Gastrointestinal: Negative for blood in stool, constipation and diarrhea  Genitourinary: Negative for frequency and hematuria  Musculoskeletal: Positive for arthralgias  Neurological: Negative for dizziness and headaches  Psychiatric/Behavioral: Negative for agitation, behavioral problems and decreased concentration         Physical Exam:  Vitals:    07/20/18 1124   BP: 126/78   BP Location: Left arm   Patient Position: Sitting   Cuff Size: Standard   Pulse: (!) 46   Weight: 80 9 kg (178 lb 6 4 oz)   Height: 5' 9" (1 753 m)       Physical Exam   Constitutional: He appears well-nourished  No distress  HENT:   Head: Normocephalic and atraumatic  Mouth/Throat: No oropharyngeal exudate  Eyes: Conjunctivae are normal  No scleral icterus  Neck: Neck supple  Normal carotid pulses and no JVD present  Carotid bruit is not present  No thyromegaly present  Cardiovascular: Regular rhythm  No extrasystoles are present  Bradycardia present  Exam reveals no gallop and no friction rub  Murmur heard  Crescendo decrescendo systolic murmur is present with a grade of 2/6    No diastolic murmur is present   Pulses:       Dorsalis pedis pulses are 2+ on the right side, and 2+ on the left side  Posterior tibial pulses are 2+ on the right side, and 2+ on the left side  Pulmonary/Chest: Breath sounds normal  He has no wheezes  He has no rhonchi  He has no rales  Abdominal: Soft  He exhibits no mass  There is no hepatosplenomegaly  There is no tenderness  Musculoskeletal: He exhibits no edema  Discussion/Summary:  1  PAF  Patient not having palpitations  Protected from stroke with Xarelto which he also takes for history of DV T  Will continue low-dose metoprolol at 12 5 mg daily for potential rate control  2  Sinus bradycardia  Asymptomatic  Patient monitors heart rate at home  Will call if he becomes more profoundly bradycardic or develops lightheadedness  3  Hypertension  Well controlled on lisinopril and metoprolol  Continue same  4  Thoracic aortic aneurysm  Being followed by CT surgery on a yearly basis  5  Mixed hyperlipidemia  Patient on low-dose pravastatin with LDL cholesterol 65 and HDL cholesterol 58    Continue same    Follow-up 9 months    Koffi Hernández MD

## 2018-07-30 DIAGNOSIS — F41.9 ANXIETY: Primary | ICD-10-CM

## 2018-07-30 RX ORDER — ALPRAZOLAM 0.25 MG/1
0.25 TABLET ORAL
Qty: 90 TABLET | Refills: 0 | Status: SHIPPED | OUTPATIENT
Start: 2018-07-30 | End: 2019-01-17 | Stop reason: SDUPTHER

## 2018-08-17 DIAGNOSIS — I26.99 OTHER PULMONARY EMBOLISM WITHOUT ACUTE COR PULMONALE, UNSPECIFIED CHRONICITY (HCC): ICD-10-CM

## 2018-10-22 ENCOUNTER — LAB (OUTPATIENT)
Dept: LAB | Facility: CLINIC | Age: 73
End: 2018-10-22
Payer: MEDICARE

## 2018-10-22 DIAGNOSIS — I10 BENIGN ESSENTIAL HYPERTENSION: ICD-10-CM

## 2018-10-22 DIAGNOSIS — I10 HYPERTENSION, UNSPECIFIED TYPE: Primary | ICD-10-CM

## 2018-10-22 DIAGNOSIS — R73.03 PREDIABETES: ICD-10-CM

## 2018-10-22 DIAGNOSIS — E78.5 HYPERLIPIDEMIA, UNSPECIFIED HYPERLIPIDEMIA TYPE: ICD-10-CM

## 2018-10-22 DIAGNOSIS — C73 MALIGNANT NEOPLASM OF THYROID GLAND (HCC): Primary | ICD-10-CM

## 2018-10-22 DIAGNOSIS — I10 HYPERTENSION, UNSPECIFIED TYPE: ICD-10-CM

## 2018-10-22 DIAGNOSIS — E03.9 HYPOTHYROIDISM, UNSPECIFIED TYPE: ICD-10-CM

## 2018-10-22 LAB
ALBUMIN SERPL BCP-MCNC: 4.1 G/DL (ref 3.5–5)
ALP SERPL-CCNC: 76 U/L (ref 46–116)
ALT SERPL W P-5'-P-CCNC: 26 U/L (ref 12–78)
ANION GAP SERPL CALCULATED.3IONS-SCNC: 8 MMOL/L (ref 4–13)
AST SERPL W P-5'-P-CCNC: 25 U/L (ref 5–45)
BILIRUB SERPL-MCNC: 1.21 MG/DL (ref 0.2–1)
BUN SERPL-MCNC: 14 MG/DL (ref 5–25)
CALCIUM SERPL-MCNC: 8.7 MG/DL (ref 8.3–10.1)
CHLORIDE SERPL-SCNC: 101 MMOL/L (ref 100–108)
CO2 SERPL-SCNC: 25 MMOL/L (ref 21–32)
CREAT SERPL-MCNC: 0.79 MG/DL (ref 0.6–1.3)
EST. AVERAGE GLUCOSE BLD GHB EST-MCNC: 120 MG/DL
GFR SERPL CREATININE-BSD FRML MDRD: 88 ML/MIN/1.73SQ M
GLUCOSE P FAST SERPL-MCNC: 92 MG/DL (ref 65–99)
HBA1C MFR BLD: 5.8 % (ref 4.2–6.3)
POTASSIUM SERPL-SCNC: 4.5 MMOL/L (ref 3.5–5.3)
PROT SERPL-MCNC: 6.9 G/DL (ref 6.4–8.2)
SODIUM SERPL-SCNC: 134 MMOL/L (ref 136–145)
T4 FREE SERPL-MCNC: 1.08 NG/DL (ref 0.76–1.46)
TSH SERPL DL<=0.05 MIU/L-ACNC: 2.79 UIU/ML (ref 0.36–3.74)

## 2018-10-22 PROCEDURE — 84443 ASSAY THYROID STIM HORMONE: CPT

## 2018-10-22 PROCEDURE — 84432 ASSAY OF THYROGLOBULIN: CPT

## 2018-10-22 PROCEDURE — 84439 ASSAY OF FREE THYROXINE: CPT | Performed by: FAMILY MEDICINE

## 2018-10-22 PROCEDURE — 86800 THYROGLOBULIN ANTIBODY: CPT

## 2018-10-22 PROCEDURE — 36415 COLL VENOUS BLD VENIPUNCTURE: CPT | Performed by: FAMILY MEDICINE

## 2018-10-22 PROCEDURE — 83036 HEMOGLOBIN GLYCOSYLATED A1C: CPT | Performed by: FAMILY MEDICINE

## 2018-10-22 PROCEDURE — 80053 COMPREHEN METABOLIC PANEL: CPT

## 2018-10-22 RX ORDER — METOPROLOL SUCCINATE 25 MG/1
12.5 TABLET, EXTENDED RELEASE ORAL DAILY
Qty: 90 TABLET | Refills: 0 | Status: SHIPPED | OUTPATIENT
Start: 2018-10-22 | End: 2018-10-22 | Stop reason: SDUPTHER

## 2018-10-22 RX ORDER — METOPROLOL SUCCINATE 25 MG/1
25 TABLET, EXTENDED RELEASE ORAL DAILY
Qty: 90 TABLET | Refills: 0 | Status: SHIPPED | OUTPATIENT
Start: 2018-10-22 | End: 2018-10-22 | Stop reason: SDUPTHER

## 2018-10-22 NOTE — TELEPHONE ENCOUNTER
Patient states the script for the metoprolol should have gone to [facility] for #45 tablets 1/2 tablet daily of the 25 mg, please resend, thanks

## 2018-10-23 RX ORDER — METOPROLOL SUCCINATE 25 MG/1
TABLET, EXTENDED RELEASE ORAL
Qty: 45 TABLET | Refills: 0 | Status: SHIPPED | OUTPATIENT
Start: 2018-10-23 | End: 2019-04-10 | Stop reason: SDUPTHER

## 2018-10-28 LAB
THYROGLOB AB SERPL-ACNC: 23.5 IU/ML (ref 0–0.9)
THYROGLOB SERPL-MCNC: 6.7 NG/ML

## 2018-10-29 ENCOUNTER — TELEPHONE (OUTPATIENT)
Dept: ENDOCRINOLOGY | Facility: CLINIC | Age: 73
End: 2018-10-29

## 2018-10-29 NOTE — PROGRESS NOTES
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

## 2018-10-29 NOTE — TELEPHONE ENCOUNTER
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

## 2018-11-02 DIAGNOSIS — E78.2 MIXED HYPERLIPIDEMIA: Primary | ICD-10-CM

## 2018-11-02 RX ORDER — PRAVASTATIN SODIUM 10 MG
TABLET ORAL
Qty: 90 TABLET | Refills: 3 | Status: SHIPPED | OUTPATIENT
Start: 2018-11-02 | End: 2019-02-27 | Stop reason: SDUPTHER

## 2018-11-06 ENCOUNTER — OFFICE VISIT (OUTPATIENT)
Dept: ENDOCRINOLOGY | Facility: CLINIC | Age: 73
End: 2018-11-06
Payer: MEDICARE

## 2018-11-06 VITALS
SYSTOLIC BLOOD PRESSURE: 130 MMHG | DIASTOLIC BLOOD PRESSURE: 70 MMHG | HEIGHT: 69 IN | BODY MASS INDEX: 26.22 KG/M2 | WEIGHT: 177 LBS | HEART RATE: 60 BPM

## 2018-11-06 DIAGNOSIS — I10 BENIGN ESSENTIAL HYPERTENSION: ICD-10-CM

## 2018-11-06 DIAGNOSIS — E89.0 POSTPROCEDURAL HYPOTHYROIDISM: Primary | ICD-10-CM

## 2018-11-06 DIAGNOSIS — R73.03 PRE-DIABETES: ICD-10-CM

## 2018-11-06 DIAGNOSIS — C73 THYROID CANCER (HCC): ICD-10-CM

## 2018-11-06 PROCEDURE — 99214 OFFICE O/P EST MOD 30 MIN: CPT | Performed by: PHYSICIAN ASSISTANT

## 2018-11-06 NOTE — PROGRESS NOTES
Established Patient Progress Note       Chief Complaint   Patient presents with    Thyroid Cancer    Hypothyroidism        History of Present Illness:     Jessica Wooten  is a 74 y o  male with a history of Thyroid Cancer, Hypothyroidism, and Pre Diabetes  For the Thyroid cancer, He had Hurthle Carcinoma Stage I with Thyroidectomy in 2011 and Radioactive iodine therapy 3/2011  He has + anti-thyroglobulin antibodies which have been decreasing over time  He had FNA of a suspicious looking nodule in january 2014 which showed no evidence of malignancy  Ultrasound June 2017 showed no recurrent disease  For the hypothyroidism, he is taking levothyroxine 175mcg daily  Denies symptoms of hypo/hyperthyroidism       For the Pre-Diabetes, he exercises regularly and has made diet modifications              Patient Active Problem List   Diagnosis    Aneurysm, ascending aorta (HCC)    Anxiety    Benign essential hypertension    Esophageal reflux    Hyperlipidemia    Paroxysmal atrial fibrillation (HCC)    Postprocedural hypothyroidism    Pulmonary nodules    Sick sinus syndrome (HCC)    Sinus bradycardia    Thyroid cancer ([address] )    Vitamin D deficiency    Pre-diabetes    Osteoporosis    Embolism and thrombosis of tibial vein, left (HCC)      Past Medical History:   Diagnosis Date    Arthritis     neck    Atrial fibrillation ([address] )     last assessed: 7/26/2017    Cancer ([address] )     skin, thyroid     Disease of thyroid gland     Disorder of epididymis     H/O malignant neoplasm of thyroid     Hyperlipidemia     last assessed: 6/29/2017    Hypertension     Osteopenia     Psychiatric disorder     Pulmonary embolism ([address] )     last assessed: 7/26/2017      Past Surgical History:   Procedure Laterality Date    APPENDECTOMY      COLONOSCOPY      complete    CYSTOSCOPY  02/01/2016    diagnostic    LEG SURGERY      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      Family History   Problem Relation Age of Onset    Coronary artery disease Father     Heart attack Father         acute myocardial infarction    Stroke Mother     Hypertension Mother      Social History   Substance Use Topics    Smoking status: Former Smoker    Smokeless tobacco: Never Used      Comment: quit years ago    Alcohol use No     Allergies   Allergen Reactions    Penicillins     Percocet [Oxycodone-Acetaminophen]        Current Outpatient Prescriptions:     ALPRAZolam (XANAX) 0 25 mg tablet, Take 1 tablet (0 25 mg total) by mouth daily at bedtime as needed for anxiety, Disp: 90 tablet, Rfl: 0    aspirin (ECOTRIN LOW STRENGTH) 81 mg EC tablet, Take 81 mg by mouth daily, Disp: , Rfl:     levothyroxine 175 mcg tablet, Take 175 mcg by mouth daily  , Disp: , Rfl:     lisinopril (ZESTRIL) 20 mg tablet, Take 20 mg by mouth daily  , Disp: , Rfl:     metoprolol succinate (TOPROL-XL) 25 mg 24 hr tablet, Take 1/2 tablet by mouth daily (12 5 mg total), Disp: 45 tablet, Rfl: 0    pravastatin (PRAVACHOL) 10 mg tablet, TAKE 1 TABLET DAILY, Disp: 90 tablet, Rfl: 3    rivaroxaban (XARELTO) 20 mg tablet, Take 1 tablet (20 mg total) by mouth daily, Disp: 90 tablet, Rfl: 0    Review of Systems   Constitutional: Negative for activity change, appetite change and fatigue  HENT: Negative for sore throat, trouble swallowing and voice change  Eyes: Negative for visual disturbance  Respiratory: Negative for choking, chest tightness and shortness of breath  Cardiovascular: Negative for chest pain, palpitations and leg swelling  Gastrointestinal: Negative for abdominal pain, constipation and diarrhea  Endocrine: Negative for cold intolerance, heat intolerance, polydipsia, polyphagia and polyuria  Genitourinary: Negative for frequency  Musculoskeletal: Negative for arthralgias and myalgias  Skin: Negative for rash  Neurological: Negative for dizziness and syncope  Hematological: Negative for adenopathy  Psychiatric/Behavioral: Negative for sleep disturbance  All other systems reviewed and are negative  Physical Exam:  Body mass index is 26 14 kg/m²  /70   Pulse 60   Ht 5' 9" (1 753 m)   Wt 80 3 kg (177 lb)   BMI 26 14 kg/m²    Wt Readings from Last 3 Encounters:   11/06/18 80 3 kg (177 lb)   07/20/18 80 9 kg (178 lb 6 4 oz)   05/17/18 81 1 kg (178 lb 12 8 oz)       Physical Exam   Constitutional: He is oriented to person, place, and time  He appears well-developed and well-nourished  No distress  HENT:   Head: Normocephalic and atraumatic  Mouth/Throat: Oropharynx is clear and moist    Eyes: Pupils are equal, round, and reactive to light  Conjunctivae and EOM are normal    Neck: Normal range of motion  Neck supple  No thyromegaly present  Cardiovascular: Normal rate, regular rhythm and normal heart sounds  No murmur heard  Pulmonary/Chest: Effort normal and breath sounds normal  No respiratory distress  He has no wheezes  He has no rales  Abdominal: Soft  Bowel sounds are normal  He exhibits no distension  There is no tenderness  Musculoskeletal: Normal range of motion  He exhibits no edema  Lymphadenopathy:     He has no cervical adenopathy  Neurological: He is alert and oriented to person, place, and time  Skin: Skin is warm and dry  Psychiatric: He has a normal mood and affect  Vitals reviewed        Labs:   Component      Latest Ref Rng & Units 10/22/2018   Sodium      136 - 145 mmol/L 134 (L)   Potassium      3 5 - 5 3 mmol/L 4 5   Chloride      100 - 108 mmol/L 101   CO2      21 - 32 mmol/L 25   Anion Gap      4 - 13 mmol/L 8   BUN      5 - 25 mg/dL 14   Creatinine      0 60 - 1 30 mg/dL 0 79   GLUCOSE FASTING      65 - 99 mg/dL 92   Calcium      8 3 - 10 1 mg/dL 8 7   AST      5 - 45 U/L 25   ALT      12 - 78 U/L 26   Alkaline Phosphatase      46 - 116 U/L 76   Total Protein      6 4 - 8 2 g/dL 6 9   Albumin      3 5 - 5 0 g/dL 4 1   TOTAL BILIRUBIN      0 20 - 1 00 mg/dL 1 21 (H)   eGFR      ml/min/1 73sq m 88   Hemoglobin A1C      4 2 - 6 3 % 5 8   EAG      mg/dl 120   Free T4      0 76 - 1 46 ng/dL 1 08   TSH 3RD GENERATON      0 358 - 3 740 uIU/mL 2 790   THYROGLOBULIN AB      0 0 - 0 9 IU/mL 23 5 (H)   THYROGLOBULIN (TG-ERMA)      ng/mL 6 7       Impression & Plan:    Problem List Items Addressed This Visit     Benign essential hypertension     Well controlled on current regimen  Postprocedural hypothyroidism - Primary     TSH at goal   Continue levothyroxine at current dose  Due to age and low risk of recurrent thyroid cancer, will keep TSH in normal range  He will plan to follow up with family physician for continued care  Thyroid cancer ([facility] )     No evidence of recurrence  Risk of recurrence is low  Ultrasound 6/2017 showed no evidence of disease  He will follow up with his family physician for continued care and yearly thyroglobulin panel  If thyroglobulin antibodies begin to increase or he develops any abnormalities on neck exam we will see him back for additional evaluation  Pre-diabetes     Fasting Glucose normal and A1C is stable  No orders of the defined types were placed in this encounter  Patient Instructions   Follow up with Family physician for continued care and yearly monitoring of TSH, Free T4, and Thyroglobulin Panel  Discussed with the patient and all questioned fully answered  He will call me if any problems arise  Please follow up with family physician for continued care and monitoring of thyroid function and thyroglobulin panel  Follow-up appointment if need       Counseled patient on diagnostic results, prognosis, risk and benefit of treatment options, instruction for management, importance of treatment compliance, Risk  factor reduction and impressions      Jong Crespo PA-C

## 2018-11-06 NOTE — PATIENT INSTRUCTIONS
Follow up with Family physician for continued care and yearly monitoring of TSH, Free T4, and Thyroglobulin Panel

## 2018-11-06 NOTE — LETTER
November 6, 2018     [facility], [address] [place] [address] [facility]     Patient: Alvaro Will  YOB: 1945   Date of Visit: 11/6/2018       Dear Dr Leonid Travis: Thank you for referring Dale Cooks to me for evaluation  Below are my notes for this consultation  If you have questions, please do not hesitate to call me  Dr Justin Cox suggests annual thyroglobulin panel and TSH, Free T4  We will be available if needed for Continued care if thyroglobulin antibodies being to increase  Sincerely,        Yari Cummings PA-C        CC: No Recipients  Yari Cummings PA-C  11/6/2018 12:17 PM  Sign at close encounter    Established Patient Progress Note       Chief Complaint   Patient presents with    Thyroid Cancer    Hypothyroidism        History of Present Illness:     Alvaro Will  is a 74 y o  male with a history of Thyroid Cancer, Hypothyroidism, and Pre Diabetes  For the Thyroid cancer, He had Hurthle Carcinoma Stage I with Thyroidectomy in 2011 and Radioactive iodine therapy 3/2011  He has + anti-thyroglobulin antibodies which have been decreasing over time  He had FNA of a suspicious looking nodule in january 2014 which showed no evidence of malignancy  Ultrasound June 2017 showed no recurrent disease  For the hypothyroidism, he is taking levothyroxine 175mcg daily  Denies symptoms of hypo/hyperthyroidism       For the Pre-Diabetes, he exercises regularly and has made diet modifications              Patient Active Problem List   Diagnosis    Aneurysm, ascending aorta (HCC)    Anxiety    Benign essential hypertension    Esophageal reflux    Hyperlipidemia    Paroxysmal atrial fibrillation (HCC)    Postprocedural hypothyroidism    Pulmonary nodules    Sick sinus syndrome (HCC)    Sinus bradycardia    Thyroid cancer ([address] )    Vitamin D deficiency    Pre-diabetes    Osteoporosis    Embolism and thrombosis of tibial vein, left [facility])      Past Medical History:   Diagnosis Date    Arthritis     neck    Atrial fibrillation ([facility] )     last assessed: 7/26/2017    Cancer ([facility] )     skin, thyroid     Disease of thyroid gland     Disorder of epididymis     H/O malignant neoplasm of thyroid     Hyperlipidemia     last assessed: 6/29/2017    Hypertension     Osteopenia     Psychiatric disorder     Pulmonary embolism ([facility] )     last assessed: 7/26/2017      Past Surgical History:   Procedure Laterality Date    APPENDECTOMY      COLONOSCOPY      complete    CYSTOSCOPY  02/01/2016    diagnostic    LEG SURGERY      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      Family History   Problem Relation Age of Onset    Coronary artery disease Father     Heart attack Father         acute myocardial infarction    Stroke Mother     Hypertension Mother      Social History   Substance Use Topics    Smoking status: Former Smoker    Smokeless tobacco: Never Used      Comment: quit years ago    Alcohol use No     Allergies   Allergen Reactions    Penicillins     Percocet [Oxycodone-Acetaminophen]        Current Outpatient Prescriptions:     ALPRAZolam (XANAX) 0 25 mg tablet, Take 1 tablet (0 25 mg total) by mouth daily at bedtime as needed for anxiety, Disp: 90 tablet, Rfl: 0    aspirin (ECOTRIN LOW STRENGTH) 81 mg EC tablet, Take 81 mg by mouth daily, Disp: , Rfl:     levothyroxine 175 mcg tablet, Take 175 mcg by mouth daily  , Disp: , Rfl:     lisinopril (ZESTRIL) 20 mg tablet, Take 20 mg by mouth daily  , Disp: , Rfl:     metoprolol succinate (TOPROL-XL) 25 mg 24 hr tablet, Take 1/2 tablet by mouth daily (12 5 mg total), Disp: 45 tablet, Rfl: 0    pravastatin (PRAVACHOL) 10 mg tablet, TAKE 1 TABLET DAILY, Disp: 90 tablet, Rfl: 3    rivaroxaban (XARELTO) 20 mg tablet, Take 1 tablet (20 mg total) by mouth daily, Disp: 90 tablet, Rfl: 0    Review of Systems   Constitutional: Negative for activity change, appetite change and fatigue  HENT: Negative for sore throat, trouble swallowing and voice change  Eyes: Negative for visual disturbance  Respiratory: Negative for choking, chest tightness and shortness of breath  Cardiovascular: Negative for chest pain, palpitations and leg swelling  Gastrointestinal: Negative for abdominal pain, constipation and diarrhea  Endocrine: Negative for cold intolerance, heat intolerance, polydipsia, polyphagia and polyuria  Genitourinary: Negative for frequency  Musculoskeletal: Negative for arthralgias and myalgias  Skin: Negative for rash  Neurological: Negative for dizziness and syncope  Hematological: Negative for adenopathy  Psychiatric/Behavioral: Negative for sleep disturbance  All other systems reviewed and are negative  Physical Exam:  Body mass index is 26 14 kg/m²  /70   Pulse 60   Ht 5' 9" (1 753 m)   Wt 80 3 kg (177 lb)   BMI 26 14 kg/m²     Wt Readings from Last 3 Encounters:   11/06/18 80 3 kg (177 lb)   07/20/18 80 9 kg (178 lb 6 4 oz)   05/17/18 81 1 kg (178 lb 12 8 oz)       Physical Exam   Constitutional: He is oriented to person, place, and time  He appears well-developed and well-nourished  No distress  HENT:   Head: Normocephalic and atraumatic  Mouth/Throat: Oropharynx is clear and moist    Eyes: Pupils are equal, round, and reactive to light  Conjunctivae and EOM are normal    Neck: Normal range of motion  Neck supple  No thyromegaly present  Cardiovascular: Normal rate, regular rhythm and normal heart sounds  No murmur heard  Pulmonary/Chest: Effort normal and breath sounds normal  No respiratory distress  He has no wheezes  He has no rales  Abdominal: Soft  Bowel sounds are normal  He exhibits no distension  There is no tenderness  Musculoskeletal: Normal range of motion  He exhibits no edema  Lymphadenopathy:     He has no cervical adenopathy  Neurological: He is alert and oriented to person, place, and time     Skin: Skin is warm and dry  Psychiatric: He has a normal mood and affect  Vitals reviewed  Labs:   Component      Latest Ref Rng & Units 10/22/2018   Sodium      136 - 145 mmol/L 134 (L)   Potassium      3 5 - 5 3 mmol/L 4 5   Chloride      100 - 108 mmol/L 101   CO2      21 - 32 mmol/L 25   Anion Gap      4 - 13 mmol/L 8   BUN      5 - 25 mg/dL 14   Creatinine      0 60 - 1 30 mg/dL 0 79   GLUCOSE FASTING      65 - 99 mg/dL 92   Calcium      8 3 - 10 1 mg/dL 8 7   AST      5 - 45 U/L 25   ALT      12 - 78 U/L 26   Alkaline Phosphatase      46 - 116 U/L 76   Total Protein      6 4 - 8 2 g/dL 6 9   Albumin      3 5 - 5 0 g/dL 4 1   TOTAL BILIRUBIN      0 20 - 1 00 mg/dL 1 21 (H)   eGFR      ml/min/1 73sq m 88   Hemoglobin A1C      4 2 - 6 3 % 5 8   EAG      mg/dl 120   Free T4      0 76 - 1 46 ng/dL 1 08   TSH 3RD GENERATON      0 358 - 3 740 uIU/mL 2 790   THYROGLOBULIN AB      0 0 - 0 9 IU/mL 23 5 (H)   THYROGLOBULIN (TG-ERMA)      ng/mL 6 7       Impression & Plan:    Problem List Items Addressed This Visit     Benign essential hypertension     Well controlled on current regimen  Postprocedural hypothyroidism - Primary     TSH at goal   Continue levothyroxine at current dose  Due to age and low risk of recurrent thyroid cancer, will keep TSH in normal range  He will plan to follow up with family physician for continued care  Thyroid cancer ([facility] )     No evidence of recurrence  Risk of recurrence is low  Ultrasound 6/2017 showed no evidence of disease  He will follow up with his family physician for continued care and yearly thyroglobulin panel  If thyroglobulin antibodies begin to increase or he develops any abnormalities on neck exam we will see him back for additional evaluation  Pre-diabetes     Fasting Glucose normal and A1C is stable  No orders of the defined types were placed in this encounter        Patient Instructions   Follow up with Family physician for continued care and yearly monitoring of TSH, Free T4, and Thyroglobulin Panel  Discussed with the patient and all questioned fully answered  He will call me if any problems arise  Please follow up with family physician for continued care and monitoring of thyroid function and thyroglobulin panel  Follow-up appointment if need       Counseled patient on diagnostic results, prognosis, risk and benefit of treatment options, instruction for management, importance of treatment compliance, Risk  factor reduction and impressions      Yari Cummings PA-C

## 2018-11-06 NOTE — ASSESSMENT & PLAN NOTE
TSH at goal   Continue levothyroxine at current dose  Due to age and low risk of recurrent thyroid cancer, will keep TSH in normal range  He will plan to follow up with family physician for continued care

## 2018-11-06 NOTE — ASSESSMENT & PLAN NOTE
No evidence of recurrence  Risk of recurrence is low  Ultrasound 6/2017 showed no evidence of disease  He will follow up with his family physician for continued care and yearly thyroglobulin panel  If thyroglobulin antibodies begin to increase or he develops any abnormalities on neck exam we will see him back for additional evaluation

## 2018-11-19 DIAGNOSIS — I26.99 OTHER PULMONARY EMBOLISM WITHOUT ACUTE COR PULMONALE, UNSPECIFIED CHRONICITY (HCC): ICD-10-CM

## 2018-12-04 DIAGNOSIS — I10 ESSENTIAL HYPERTENSION: Primary | ICD-10-CM

## 2018-12-04 RX ORDER — LISINOPRIL 20 MG/1
20 TABLET ORAL DAILY
Qty: 90 TABLET | Refills: 0 | OUTPATIENT
Start: 2018-12-04

## 2018-12-04 RX ORDER — LISINOPRIL 20 MG/1
TABLET ORAL
Qty: 90 TABLET | Refills: 3 | Status: SHIPPED | OUTPATIENT
Start: 2018-12-04 | End: 2019-01-17

## 2018-12-19 DIAGNOSIS — E03.9 HYPOTHYROIDISM, UNSPECIFIED TYPE: Primary | ICD-10-CM

## 2018-12-19 RX ORDER — LEVOTHYROXINE SODIUM 175 UG/1
175 TABLET ORAL DAILY
Qty: 90 TABLET | Refills: 0 | Status: SHIPPED | OUTPATIENT
Start: 2018-12-19 | End: 2019-03-20 | Stop reason: SDUPTHER

## 2019-01-17 ENCOUNTER — OFFICE VISIT (OUTPATIENT)
Dept: FAMILY MEDICINE CLINIC | Facility: CLINIC | Age: 74
End: 2019-01-17
Payer: MEDICARE

## 2019-01-17 VITALS
SYSTOLIC BLOOD PRESSURE: 148 MMHG | DIASTOLIC BLOOD PRESSURE: 80 MMHG | BODY MASS INDEX: 26.04 KG/M2 | WEIGHT: 175.8 LBS | HEIGHT: 69 IN

## 2019-01-17 DIAGNOSIS — I48.0 PAROXYSMAL ATRIAL FIBRILLATION (HCC): ICD-10-CM

## 2019-01-17 DIAGNOSIS — I71.2 ANEURYSM, ASCENDING AORTA (HCC): ICD-10-CM

## 2019-01-17 DIAGNOSIS — F41.9 ANXIETY: ICD-10-CM

## 2019-01-17 DIAGNOSIS — E78.2 MIXED HYPERLIPIDEMIA: ICD-10-CM

## 2019-01-17 DIAGNOSIS — R73.03 PRE-DIABETES: ICD-10-CM

## 2019-01-17 DIAGNOSIS — I26.99 OTHER PULMONARY EMBOLISM WITHOUT ACUTE COR PULMONALE, UNSPECIFIED CHRONICITY (HCC): ICD-10-CM

## 2019-01-17 DIAGNOSIS — I10 BENIGN ESSENTIAL HYPERTENSION: Primary | ICD-10-CM

## 2019-01-17 PROCEDURE — 99214 OFFICE O/P EST MOD 30 MIN: CPT | Performed by: FAMILY MEDICINE

## 2019-01-17 RX ORDER — LISINOPRIL AND HYDROCHLOROTHIAZIDE 20; 12.5 MG/1; MG/1
1 TABLET ORAL DAILY
Qty: 30 TABLET | Refills: 3 | Status: SHIPPED | OUTPATIENT
Start: 2019-01-17 | End: 2019-01-28

## 2019-01-17 RX ORDER — ALPRAZOLAM 0.5 MG/1
0.5 TABLET ORAL
Qty: 90 TABLET | Refills: 0 | Status: SHIPPED | OUTPATIENT
Start: 2019-01-17 | End: 2019-12-30 | Stop reason: SDUPTHER

## 2019-01-17 NOTE — PATIENT INSTRUCTIONS
Here for BP check and has had high BP and will stop lisinopril 20 mg daily and change to lisinopril HCT 20/12 5 once daily  He will need refill of Xanax for mail order as well as Xarelto for hx of PE  He is doing well otherwise and needs recheck in 2 weeks for BP check  F-up with cardiology as directed for hx of afib and call if any problems  Low sugar diet and low salt diet encouraged

## 2019-01-17 NOTE — PROGRESS NOTES
Assessment/Plan:  Chief Complaint   Patient presents with    Follow-up    Hypertension     has been running high lately  Patient Instructions   Here for BP check and has had high BP and will stop lisinopril 20 mg daily and change to lisinopril HCT 20/12 5 once daily  He will need refill of Xanax for mail order as well as Xarelto for hx of PE  He is doing well otherwise and needs recheck in 2 weeks for BP check  F-up with cardiology as directed for hx of afib and call if any problems  Low sugar diet and low salt diet encouraged  No problem-specific Assessment & Plan notes found for this encounter  Diagnoses and all orders for this visit:    Benign essential hypertension  -     lisinopril-hydrochlorothiazide (PRINZIDE,ZESTORETIC) 20-12 5 MG per tablet; Take 1 tablet by mouth daily    Anxiety  -     ALPRAZolam (XANAX) 0 5 mg tablet; Take 1 tablet (0 5 mg total) by mouth daily at bedtime as needed for anxiety    Aneurysm, ascending aorta (HCC)    Mixed hyperlipidemia    Pre-diabetes    Paroxysmal atrial fibrillation ([address] )    Other pulmonary embolism without acute cor pulmonale, unspecified chronicity (HCC)  -     rivaroxaban (XARELTO) 20 mg tablet; Take 1 tablet (20 mg total) by mouth daily          Subjective:      Patient ID: Charlie Sanchez  is a 74 y o  male  Here for BP check and has a hx of HTN and anxiety  Has had higher readings recently and has a hx of a-fib and ascending aortic aneurysm  He needs a refill of his Xarelto and also xanax for anxiety  He has a hx of prediabetes and was released by Endocrinology and can f-up with our office now  He still sees Dr Js Munoz for his heart issues  Has had anxiety issues and taking 2 Xanax 0 25 mg daily prn anxiety just recently since his BP has been high  He is anxious about his elevated BP recently         Hypertension         The following portions of the patient's history were reviewed and updated as appropriate: allergies, current medications, past family history, past medical history, past social history, past surgical history and problem list     Review of Systems   Constitutional: Negative  HENT: Negative  Eyes: Negative  Respiratory: Negative  Cardiovascular: Negative  Gastrointestinal: Negative  Endocrine: Negative  Genitourinary: Negative  Musculoskeletal: Negative  Skin: Negative  Allergic/Immunologic: Negative  Neurological: Negative  Hematological: Negative  Psychiatric/Behavioral:        Anxiety         Objective:      /80 (BP Location: Left arm, Patient Position: Sitting, Cuff Size: Standard)   Ht 5' 9" (1 753 m)   Wt 79 7 kg (175 lb 12 8 oz)   BMI 25 96 kg/m²          Physical Exam   Constitutional: He is oriented to person, place, and time  He appears well-developed and well-nourished  HENT:   Head: Normocephalic and atraumatic  Right Ear: External ear normal    Left Ear: External ear normal    Nose: Nose normal    Mouth/Throat: Oropharynx is clear and moist    Eyes: Pupils are equal, round, and reactive to light  Conjunctivae and EOM are normal    Neck: Normal range of motion  Neck supple  Cardiovascular: Normal rate, regular rhythm, normal heart sounds and intact distal pulses  Pulmonary/Chest: Effort normal and breath sounds normal    Musculoskeletal: Normal range of motion  Neurological: He is alert and oriented to person, place, and time  He has normal reflexes  Skin: Skin is warm and dry  Psychiatric: He has a normal mood and affect   His behavior is normal    anxiety

## 2019-01-28 ENCOUNTER — TELEPHONE (OUTPATIENT)
Dept: FAMILY MEDICINE CLINIC | Facility: CLINIC | Age: 74
End: 2019-01-28

## 2019-01-28 ENCOUNTER — OFFICE VISIT (OUTPATIENT)
Dept: FAMILY MEDICINE CLINIC | Facility: CLINIC | Age: 74
End: 2019-01-28
Payer: MEDICARE

## 2019-01-28 VITALS
SYSTOLIC BLOOD PRESSURE: 132 MMHG | BODY MASS INDEX: 25.89 KG/M2 | WEIGHT: 174.8 LBS | HEIGHT: 69 IN | DIASTOLIC BLOOD PRESSURE: 84 MMHG

## 2019-01-28 DIAGNOSIS — I71.2 ANEURYSM, ASCENDING AORTA (HCC): ICD-10-CM

## 2019-01-28 DIAGNOSIS — I10 BENIGN ESSENTIAL HYPERTENSION: Primary | ICD-10-CM

## 2019-01-28 DIAGNOSIS — F41.9 ANXIETY: ICD-10-CM

## 2019-01-28 PROCEDURE — 99214 OFFICE O/P EST MOD 30 MIN: CPT | Performed by: FAMILY MEDICINE

## 2019-01-28 RX ORDER — LISINOPRIL AND HYDROCHLOROTHIAZIDE 25; 20 MG/1; MG/1
1 TABLET ORAL DAILY
Qty: 30 TABLET | Refills: 5 | Status: SHIPPED | OUTPATIENT
Start: 2019-01-28 | End: 2019-03-01 | Stop reason: ALTCHOICE

## 2019-01-28 NOTE — PATIENT INSTRUCTIONS
Has had some elevated BP and will titrate lisinopril HCT to 20/25 once daily and recheck BP in 1 month  Hx of ascending aortic aneurysm and also anxiety  Take anxiety med as directed prn anxiety  Call if any problems with higher dose of BP med

## 2019-01-28 NOTE — PROGRESS NOTES
Assessment/Plan:  Chief Complaint   Patient presents with    Follow-up    Hypertension     monitoring blood pressure at home and still seems to be high     Patient Instructions   Has had some elevated BP and will titrate lisinopril HCT to 20/25 once daily and recheck BP in 1 month  Hx of ascending aortic aneurysm and also anxiety  Take anxiety med as directed prn anxiety  Call if any problems with higher dose of BP med  No problem-specific Assessment & Plan notes found for this encounter  Diagnoses and all orders for this visit:    Benign essential hypertension  -     lisinopril-hydrochlorothiazide (PRINZIDE,ZESTORETIC) 20-25 MG per tablet; Take 1 tablet by mouth daily    Aneurysm, ascending aorta (HCC)    Anxiety          Subjective:      Patient ID: Chiquita Childers  is a 74 y o  male  Here for BP check, has had some BP fluctuations and has a hx of ascending ortic aneurysm  Hx of some anxiety also and did take a Xanax before coming to the office  No cp or sob, or ha  He gets anxious when BP  Is being taken  Possibly elevated BP with anxiety  The following portions of the patient's history were reviewed and updated as appropriate: allergies, current medications, past family history, past medical history, past social history, past surgical history and problem list     Review of Systems   Constitutional: Negative  HENT: Negative  Eyes: Negative  Respiratory: Negative  Cardiovascular: Negative  Gastrointestinal: Negative  Endocrine: Negative  Genitourinary: Negative  Musculoskeletal: Negative  Skin: Negative  Allergic/Immunologic: Negative  Neurological: Negative  Hematological: Negative  Psychiatric/Behavioral: Negative  Objective:      /84   Ht 5' 9" (1 753 m)   Wt 79 3 kg (174 lb 12 8 oz)   BMI 25 81 kg/m²          Physical Exam   Constitutional: He is oriented to person, place, and time   He appears well-developed and well-nourished  HENT:   Head: Normocephalic and atraumatic  Right Ear: External ear normal    Left Ear: External ear normal    Nose: Nose normal    Mouth/Throat: Oropharynx is clear and moist    Eyes: Pupils are equal, round, and reactive to light  Conjunctivae and EOM are normal    Neck: Normal range of motion  Neck supple  Cardiovascular: Normal rate, regular rhythm, normal heart sounds and intact distal pulses  Pulmonary/Chest: Effort normal and breath sounds normal    Musculoskeletal: Normal range of motion  Neurological: He is alert and oriented to person, place, and time  He has normal reflexes  Skin: Skin is warm and dry  Psychiatric: He has a normal mood and affect   His behavior is normal

## 2019-01-28 NOTE — TELEPHONE ENCOUNTER
Patient has concerns with his BP being high / BP meds not working  He would like to be seen today  Would you like to overbook?

## 2019-02-25 ENCOUNTER — HOSPITAL ENCOUNTER (OUTPATIENT)
Dept: CT IMAGING | Facility: HOSPITAL | Age: 74
Discharge: HOME | End: 2019-02-25
Payer: MEDICARE

## 2019-02-25 DIAGNOSIS — I71.2 ANEURYSM, ASCENDING AORTA (HCC): ICD-10-CM

## 2019-02-25 PROCEDURE — 71250 CT THORAX DX C-: CPT

## 2019-02-27 ENCOUNTER — OFFICE VISIT (OUTPATIENT)
Dept: FAMILY MEDICINE CLINIC | Facility: CLINIC | Age: 74
End: 2019-02-27
Payer: MEDICARE

## 2019-02-27 VITALS
HEIGHT: 69 IN | DIASTOLIC BLOOD PRESSURE: 74 MMHG | BODY MASS INDEX: 25.92 KG/M2 | SYSTOLIC BLOOD PRESSURE: 136 MMHG | WEIGHT: 175 LBS

## 2019-02-27 DIAGNOSIS — E55.9 VITAMIN D DEFICIENCY: ICD-10-CM

## 2019-02-27 DIAGNOSIS — E78.2 MIXED HYPERLIPIDEMIA: ICD-10-CM

## 2019-02-27 DIAGNOSIS — Z12.5 SCREENING FOR PROSTATE CANCER: ICD-10-CM

## 2019-02-27 DIAGNOSIS — F41.9 ANXIETY: Primary | ICD-10-CM

## 2019-02-27 DIAGNOSIS — I10 BENIGN ESSENTIAL HYPERTENSION: ICD-10-CM

## 2019-02-27 DIAGNOSIS — I71.2 ANEURYSM, ASCENDING AORTA (HCC): ICD-10-CM

## 2019-02-27 PROCEDURE — 99214 OFFICE O/P EST MOD 30 MIN: CPT | Performed by: FAMILY MEDICINE

## 2019-02-27 RX ORDER — PRAVASTATIN SODIUM 10 MG
10 TABLET ORAL DAILY
Qty: 90 TABLET | Refills: 3 | Status: SHIPPED | OUTPATIENT
Start: 2019-02-27 | End: 2020-06-15 | Stop reason: SDUPTHER

## 2019-02-27 NOTE — PATIENT INSTRUCTIONS
Here for anxiety and HTN and stable, Take all meds as directed and recheck BP in 3 months  Refilled cholesterol medication also today  Meditation and stress management discussed  His anxiety can cause elevation in BP

## 2019-02-27 NOTE — PROGRESS NOTES
Assessment/Plan:  Chief Complaint   Patient presents with    Follow-up    Hypertension     Patient Instructions   Here for anxiety and HTN and stable, Take all meds as directed and recheck BP in 3 months  Refilled cholesterol medication also today  Meditation and stress management discussed  His anxiety can cause elevation in BP  No problem-specific Assessment & Plan notes found for this encounter  Diagnoses and all orders for this visit:    Anxiety    Benign essential hypertension    Mixed hyperlipidemia  -     pravastatin (PRAVACHOL) 10 mg tablet; Take 1 tablet (10 mg total) by mouth daily    Aneurysm, ascending aorta (HCC)          Subjective:      Patient ID: Danay Joseph  is a 74 y o  male  Here for HTN and anxiety and doing well  No cp or sob, or ha  Hx of ascending aortic aneurysm  He takes Xanax prn anxiety  He needs a refill of his Pravastatin  The following portions of the patient's history were reviewed and updated as appropriate: allergies, current medications, past family history, past medical history, past social history, past surgical history and problem list     Review of Systems   Constitutional: Negative  HENT: Negative  Eyes: Negative  Respiratory: Negative  Cardiovascular: Negative  Gastrointestinal: Negative  Endocrine: Negative  Genitourinary: Negative  Musculoskeletal: Negative  Skin: Negative  Allergic/Immunologic: Negative  Neurological: Negative  Hematological: Negative  Psychiatric/Behavioral: Negative  Objective:      /74   Ht 5' 9" (1 753 m)   Wt 79 4 kg (175 lb)   BMI 25 84 kg/m²          Physical Exam   Constitutional: He is oriented to person, place, and time  He appears well-developed and well-nourished  HENT:   Head: Normocephalic and atraumatic     Right Ear: External ear normal    Left Ear: External ear normal    Nose: Nose normal    Mouth/Throat: Oropharynx is clear and moist    Eyes: Pupils are equal, round, and reactive to light  Conjunctivae and EOM are normal    Neck: Normal range of motion  Neck supple  Cardiovascular: Normal rate, regular rhythm, normal heart sounds and intact distal pulses  Pulmonary/Chest: Effort normal and breath sounds normal    Musculoskeletal: Normal range of motion  Neurological: He is alert and oriented to person, place, and time  He has normal reflexes  Skin: Skin is warm and dry  Psychiatric: He has a normal mood and affect   His behavior is normal

## 2019-03-01 ENCOUNTER — OFFICE VISIT (OUTPATIENT)
Dept: CARDIOLOGY CLINIC | Facility: CLINIC | Age: 74
End: 2019-03-01
Payer: MEDICARE

## 2019-03-01 VITALS
SYSTOLIC BLOOD PRESSURE: 136 MMHG | HEART RATE: 60 BPM | BODY MASS INDEX: 25.86 KG/M2 | WEIGHT: 174.6 LBS | RESPIRATION RATE: 16 BRPM | DIASTOLIC BLOOD PRESSURE: 62 MMHG | HEIGHT: 69 IN

## 2019-03-01 DIAGNOSIS — E78.2 MIXED HYPERLIPIDEMIA: ICD-10-CM

## 2019-03-01 DIAGNOSIS — I71.2 ANEURYSM, ASCENDING AORTA (HCC): ICD-10-CM

## 2019-03-01 DIAGNOSIS — I48.0 PAROXYSMAL ATRIAL FIBRILLATION (HCC): Primary | ICD-10-CM

## 2019-03-01 DIAGNOSIS — R00.1 SINUS BRADYCARDIA: ICD-10-CM

## 2019-03-01 DIAGNOSIS — I10 BENIGN ESSENTIAL HYPERTENSION: ICD-10-CM

## 2019-03-01 PROCEDURE — 99214 OFFICE O/P EST MOD 30 MIN: CPT | Performed by: INTERNAL MEDICINE

## 2019-03-01 RX ORDER — LISINOPRIL AND HYDROCHLOROTHIAZIDE 20; 12.5 MG/1; MG/1
1 TABLET ORAL DAILY
Start: 2019-03-01 | End: 2020-04-13 | Stop reason: SDUPTHER

## 2019-03-01 NOTE — PROGRESS NOTES
Cardiology Follow Up    Nithin Billy   1945  [id number]  [address] [facility] [address] [id number] [phone]    Reason for visit: 9 month FU for PAF, SSS with sinus bradycardia, HTN and HLP  David Hunghawa Also has TAA (CT follows)    Also has hx of DVT    1  Paroxysmal atrial fibrillation (HCC)     2  Benign essential hypertension     3  Aneurysm, ascending aorta ([address] )     4  Sinus bradycardia     5  Mixed hyperlipidemia         Interval History: The patient comes a bit early due to elevated BP    Dr Leah Fontana added HCTZ 12 5 mg then 25 mg  He finds if does not take xanax before his BP it is elevated  David Reyes He is imbibing with more sodium  David Reyes He denies chest pain or SOB  He is having some positional dizziness at this time  His BPs are now low normal   He denies palpitations or edema of the LEs    Patient Active Problem List   Diagnosis    Aneurysm, ascending aorta (HCC)    Anxiety    Benign essential hypertension    Esophageal reflux    Hyperlipidemia    Paroxysmal atrial fibrillation (HCC)    Postprocedural hypothyroidism    Pulmonary nodules    Sick sinus syndrome (HCC)    Sinus bradycardia    [facility]    Vitamin D deficiency    Pre-diabetes    Osteoporosis    Embolism and thrombosis of tibial vein, left (HCC)     Past Medical History:   Diagnosis Date    Arthritis     neck    Atrial fibrillation ([address] )     last assessed: 7/26/2017    Cancer ([facility] [address] )     skin, thyroid     Disease of thyroid gland     Disorder of epididymis     H/O malignant neoplasm of thyroid     Hyperlipidemia     last assessed: 6/29/2017    Hypertension     Osteopenia     Psychiatric disorder     Pulmonary embolism ([address] )     last assessed: 7/26/2017     Social History     Socioeconomic History    Marital status: /Civil Union     Spouse name: Not on file    Number of children: Not on file    Years of education: Not on file  Highest education level: Not on file   Occupational History    Not on file   Social Needs    Financial resource strain: Not on file    Food insecurity:     Worry: Not on file     Inability: Not on file    Transportation needs:     Medical: Not on file     Non-medical: Not on file   Tobacco Use    Smoking status: Former Smoker    Smokeless tobacco: Never Used    Tobacco comment: quit years ago   Substance and Sexual Activity    Alcohol use: No    Drug use: No    Sexual activity: Not on file   Lifestyle    Physical activity:     Days per week: Not on file     Minutes per session: Not on file    Stress: Not on file   Relationships    Social connections:     Talks on phone: Not on file     Gets together: Not on file     Attends Rastafari service: Not on file     Active member of club or organization: Not on file     Attends meetings of clubs or organizations: Not on file     Relationship status: Not on file    Intimate partner violence:     Fear of current or ex partner: Not on file     Emotionally abused: Not on file     Physically abused: Not on file     Forced sexual activity: Not on file   Other Topics Concern    Not on file   Social History Narrative    Not on file      Family History   Problem Relation Age of Onset    Coronary artery disease Father     Heart attack Father         acute myocardial infarction    Stroke Mother     Hypertension Mother      Past Surgical History:   Procedure Laterality Date    APPENDECTOMY      COLONOSCOPY      complete    CYSTOSCOPY  02/01/2016    diagnostic    LEG SURGERY      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       Current Outpatient Medications:     ALPRAZolam (XANAX) 0 5 mg tablet, Take 1 tablet (0 5 mg total) by mouth daily at bedtime as needed for anxiety, Disp: 90 tablet, Rfl: 0    aspirin (ECOTRIN LOW STRENGTH) 81 mg EC tablet, Take 81 mg by mouth daily, Disp: , Rfl:     levothyroxine 175 mcg tablet, Take 1 tablet (175 mcg total) by mouth daily, Disp: 90 tablet, Rfl: 0    lisinopril-hydrochlorothiazide (PRINZIDE,ZESTORETIC) 20-25 MG per tablet, Take 1 tablet by mouth daily, Disp: 30 tablet, Rfl: 5    metoprolol succinate (TOPROL-XL) 25 mg 24 hr tablet, Take 1/2 tablet by mouth daily (12 5 mg total), Disp: 45 tablet, Rfl: 0    pravastatin (PRAVACHOL) 10 mg tablet, Take 1 tablet (10 mg total) by mouth daily, Disp: 90 tablet, Rfl: 3    rivaroxaban (XARELTO) 20 mg tablet, Take 1 tablet (20 mg total) by mouth daily, Disp: 90 tablet, Rfl: 3  Allergies   Allergen Reactions    Penicillins     Percocet [Oxycodone-Acetaminophen]        Result Date: 2/28/2019  Narrative: CT CHEST WITHOUT IV CONTRAST INDICATION:   I71 2: Thoracic aortic aneurysm, without rupture  COMPARISON:  March 6, 2018 TECHNIQUE: CT examination of the chest was performed without intravenous contrast   Axial, sagittal, and coronal 2D reformatted images were created from the source data and submitted for interpretation  Radiation dose length product (DLP) for this visit:  293 mGy-cm   This examination, like all CT scans performed in the [facility], was performed utilizing techniques to minimize radiation dose exposure, including the use of iterative reconstruction and automated exposure control  FINDINGS: LUNGS:  Atelectasis seen within the lung bases  The trachea and central bronchial tree are patent  PLEURA:  Unremarkable  HEART/GREAT VESSELS:  The ascending aorta measures up to 4 3 cm which is stable compared to prior study  The heart is not enlarged  There is a tiny pericardial effusion similar to prior study  MEDIASTINUM AND WILL:  Unremarkable  CHEST WALL AND LOWER NECK:   Status post thyroidectomy  VISUALIZED STRUCTURES IN THE UPPER ABDOMEN:  Unremarkable  OSSEOUS STRUCTURES:  No acute fracture or destructive osseous lesion  Impression: Stable size of ascending thoracic aortic aneurysm   Workstation performed: [id number]       Review of Systems:  Review of Systems   Constitutional: Negative for activity change, appetite change, fatigue and unexpected weight change  Respiratory: Negative for cough, chest tightness, shortness of breath and wheezing  Cardiovascular: Negative for chest pain, palpitations and leg swelling  Gastrointestinal: Negative for abdominal pain, blood in stool, constipation and diarrhea  Genitourinary: Negative for dysuria, hematuria and urgency  Musculoskeletal: Negative for arthralgias, back pain, gait problem and joint swelling  Neurological: Positive for dizziness and light-headedness  Negative for speech difficulty, numbness and headaches  Psychiatric/Behavioral: Negative for agitation, behavioral problems, confusion and decreased concentration  The patient is nervous/anxious  Physical Exam:  Vitals:    03/01/19 0802   BP: 136/62   Pulse: 60   Resp: 16   Weight: 79 2 kg (174 lb 9 6 oz)   Height: 5' 9" (1 753 m)       Physical Exam   Constitutional: He is oriented to person, place, and time  He appears well-developed and well-nourished  No distress  HENT:   Head: Normocephalic and atraumatic  Mouth/Throat: Oropharynx is clear and moist  No oropharyngeal exudate  Eyes: Conjunctivae are normal  No scleral icterus  Neck: Neck supple  Normal carotid pulses and no JVD present  Carotid bruit is not present  No thyromegaly present  Cardiovascular: Normal rate, regular rhythm and intact distal pulses  Exam reveals no gallop and no friction rub  Murmur heard  Crescendo decrescendo systolic murmur is present with a grade of 2/6  No diastolic murmur is present  Pulses:       Dorsalis pedis pulses are 2+ on the right side, and 2+ on the left side  Posterior tibial pulses are 2+ on the right side, and 2+ on the left side  Pulmonary/Chest: Breath sounds normal  He has no wheezes  He has no rhonchi  He has no rales  Abdominal: Soft  He exhibits no mass  There is no hepatosplenomegaly   There is no tenderness  Musculoskeletal: He exhibits no edema, tenderness or deformity  Neurological: He is alert and oriented to person, place, and time  He has normal strength  No cranial nerve deficit or sensory deficit  Skin: Skin is warm and dry  No rash noted  No erythema  No pallor  Psychiatric: He has a normal mood and affect  His behavior is normal  Judgment and thought content normal        Discussion/Summary:  1  PAF-in SR  Shawn Manifold On xarelto and metoprolol for potential rate control    Having no palpitations  2  HTN-suspect element of stress induced HTN    Will continue HCTZ but reduce to 12 5 mg daily    BP a bit on lower side and has orthostatic sx    Needs to get BMP to make sure no lyte issues  3  TAA-stable at 4 3 cm    Followed by CT  4  Sinus bradycardia    HR 60 today    No symptoms to suggest symptomatic bradycardia  5  Mixed hyperlipidemia  LDL cholesterol excellent last year at 65 on current dose of pravastatin      FU 9 months      Debbie Xavier MD

## 2019-03-05 ENCOUNTER — OFFICE VISIT (OUTPATIENT)
Dept: CARDIAC SURGERY | Facility: CLINIC | Age: 74
End: 2019-03-05
Payer: MEDICARE

## 2019-03-05 VITALS
TEMPERATURE: 97.6 F | SYSTOLIC BLOOD PRESSURE: 124 MMHG | BODY MASS INDEX: 25.77 KG/M2 | OXYGEN SATURATION: 96 % | WEIGHT: 174 LBS | HEIGHT: 69 IN | RESPIRATION RATE: 12 BRPM | DIASTOLIC BLOOD PRESSURE: 72 MMHG | HEART RATE: 58 BPM

## 2019-03-05 DIAGNOSIS — I71.2 ANEURYSM, ASCENDING AORTA (HCC): Primary | ICD-10-CM

## 2019-03-05 PROCEDURE — 99213 OFFICE O/P EST LOW 20 MIN: CPT | Performed by: NURSE PRACTITIONER

## 2019-03-05 NOTE — PROGRESS NOTES
Aortic Clinic  Jessica Dow  74 y o  male MRN: [id number]    Reason for Consult / Principal Problem: Ascending aortic aneurysm    History of Present Illness: Jessica Dow  is a 74y o  year old male who presents today for ongoing surveillance of an ascending aortic aneurysm  This was initially identified in August 2016 as an incidental finding and measured 43 x 45 mm in maximal diameter  He has been followed in our aortic clinic since that time with serial imaging demonstrating stability of the size of the aneurysm  He underwent a routine echocardiogram in 2016 that revealed a normal trileaflet aortic valve without significant dysfunction  Jessica Dow  returns to our office today for a 1 year interval follow up visit with a repeat non-contrast CT scan of the chest      Upon interview today, Ceasar Vasquez reports no significant change in his health status since his visit here last year  His wife is a retired nurse and takes his BP daily at home with sphygmomanometer and stethoscope  He has had some isolated elevations up to 746-140 systolic  He has been seen by his PCP and cardiologist and his medications have been adjusted  He denies chest pain, SOB, back pain, abdominal pain, numbness, tingling, limb weakness or pain  He reports occasional brief lightheadedness with position changes but denies presyncope or syncope  He continues to exercise regularly on an elliptical and works PT 2 days per week  He admits to our recommended lifting restriction of no more than 50 lb        Past Medical History:  Past Medical History:   Diagnosis Date    Arthritis     neck    Atrial fibrillation ([facility] [address] )     last assessed: 7/26/2017    Cancer ([facility] [address] )     skin, thyroid     Disease of thyroid gland     Disorder of epididymis     H/O malignant neoplasm of thyroid     Hyperlipidemia     last assessed: 6/29/2017    Hypertension     Osteopenia     Psychiatric disorder     Pulmonary embolism ([address] )     last assessed: 7/26/2017         Past Surgical History:   Past Surgical History:   Procedure Laterality Date    APPENDECTOMY      COLONOSCOPY      complete    CYSTOSCOPY  02/01/2016    diagnostic    LEG SURGERY      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         Family History:  Family History   Problem Relation Age of Onset    Coronary artery disease Father     Heart attack Father         acute myocardial infarction    Stroke Mother     Hypertension Mother          Social History:    Social History     Substance and Sexual Activity   Alcohol Use No     Social History     Substance and Sexual Activity   Drug Use No     Social History     Tobacco Use   Smoking Status Former Smoker   Smokeless Tobacco Never Used   Tobacco Comment    quit years ago         Home Medications:   Prior to Admission medications    Medication Sig Start Date End Date Taking? Authorizing Provider   ALPRAZolam Durel Alt) 0 5 mg tablet Take 1 tablet (0 5 mg total) by mouth daily at bedtime as needed for anxiety 1/17/19   Gianlucayle Numbers, DO   aspirin (ECOTRIN LOW STRENGTH) 81 mg EC tablet Take 81 mg by mouth daily    Historical Provider, MD   levothyroxine 175 mcg tablet Take 1 tablet (175 mcg total) by mouth daily 12/19/18   Gianlucayle Numbers, DO   lisinopril-hydrochlorothiazide (PRINZIDE,ZESTORETIC) 20-12 5 MG per tablet Take 1 tablet by mouth daily 3/1/19   Kirsten Clark MD   metoprolol succinate (TOPROL-XL) 25 mg 24 hr tablet Take 1/2 tablet by mouth daily (12 5 mg total) 10/23/18   Gianlucayle Numbers, DO   pravastatin (PRAVACHOL) 10 mg tablet Take 1 tablet (10 mg total) by mouth daily 2/27/19   Gianlucayle Numbers, DO   rivaroxaban (XARELTO) 20 mg tablet Take 1 tablet (20 mg total) by mouth daily 1/17/19   Gianlucayle Numbers, DO       Allergies:   Allergies   Allergen Reactions    Penicillins      "nearly passed out"    Percocet [Oxycodone-Acetaminophen]        Review of Systems:   Review of Systems - History obtained from chart review and the patient  General ROS: negative  Psychological ROS: positive for - anxiety  Ophthalmic ROS: positive for - uses glasses  ENT ROS: negative  Hematological and Lymphatic ROS: negative for - bleeding problems, bruising or jaundice  Respiratory ROS: no cough, shortness of breath, or wheezing  Cardiovascular ROS: no chest pain or dyspnea on exertion  Gastrointestinal ROS: no abdominal pain, change in bowel habits, or black or bloody stools  Genito-Urinary ROS: no dysuria, trouble voiding, or hematuria  Musculoskeletal ROS: negative  Neurological ROS: no TIA or stroke symptoms    Vital Signs:   Vitals:    03/05/19 1000 03/05/19 1013   BP: 122/72 124/72   BP Location: Left arm Right arm   Cuff Size: Adult    Pulse: 58    Resp: 12    Temp: 97 6 °F (36 4 °C)    TempSrc: Oral    SpO2: 96%    Weight: 78 9 kg (174 lb)    Height: 5' 9" (1 753 m)        Physical Exam:  General: well developed, no acute distress  HEENT/NECK:  PERRLA  No jugular venous distention  Cardiac:Regular rate and rhythm, No murmurs rubs or gallops  Carotid arteries: 2+ pulses, no bruits  Pulmonary:  Breath sounds clear bilaterally  Abdomen:  Non-tender, Non-distended  Positive bowel sounds  Upper extremities: 2+ radial pulses; brisk capillary refill  Lower extremities: Extremities warm/dry  PT/DP pulses 2+ bilaterally  No edema B/L  Neuro: Alert and oriented X 3  Sensation is grossly intact  No focal deficits  Musculoskeletal: MAEE, stable gait, no deficits  Skin: Warm/Dry, without rashes or lesions      Lab Results:       Lab Results   Component Value Date    HGBA1C 5 8 10/22/2018       Imaging Studies:     CT Chest:   Stable ascending aortic aneurysm 43 x 46 mm, no dissection    Echocardiogram: 9/2016  Trileaflet aortic valve    I have personally reviewed pertinent films in PACS    Assessment:  Patient Active Problem List    Diagnosis Date Noted    Pulmonary nodules 03/27/2017    Embolism and thrombosis of tibial vein, left ([address] ) 03/13/2017    Aneurysm, ascending aorta (HCC) 09/01/2016    [facility] 02/12/2014    Anxiety 12/17/2013    Sick sinus syndrome (HCC) 07/03/2013    Sinus bradycardia 07/03/2013    Paroxysmal [facility] fibrillation ([facility] ) 07/02/2013    Pre-diabetes 05/17/2013    Vitamin D deficiency 04/05/2013    Postprocedural hypothyroidism 02/19/2013    Esophageal reflux 08/17/2012    Osteoporosis 06/08/2012    Benign essential hypertension 05/03/2012    Hyperlipidemia 05/03/2012     Stable ascending aortic aneurysm    Plan:    CT imaging performed prior to this visit demonstrates the ascending aorta measuring 43 x 46 mm in size at its greatest diameter  These findings are stable and unchanged in comparison to several CT scans erformed since 8/2016  This size does not meet surgical criteria  These findings were confirmed and shared with the patient today  Since Regina Johnson is asymptomatic, with stable findings on imaging and  no family history, follow-up monitoring is the treatment plan  Arrangements have been made for future surveillance to be completed with CT chest, without contrast in 2 years  Edie Bauer  was comfortable with our recommendations, and his questions were answered to his satisfaction  Thank you for allowing us to participate in the care of this patient  Aortic Aneurysm Instructions were provided to the patient as follows:    1  No lifting more than 50 pounds  Regular aerobic exercise permitted and recommended  2  Maintain a controlled blood pressure with a goal of less than 140/80  3  Follow up in Aortic Clinic as recommended with radiology follow up as instructed  4  Report to the ER or call 911 immediately with the following signs / symptoms: sudden onset of back pain, chest pain or shortness of breath  5  Remain Tobacco Free      SIGNATURE: WILLIAM Reyes  DATE: March 5, 2019  TIME: 10:51 AM

## 2019-03-20 DIAGNOSIS — E03.9 HYPOTHYROIDISM, UNSPECIFIED TYPE: ICD-10-CM

## 2019-03-20 RX ORDER — LEVOTHYROXINE SODIUM 175 UG/1
175 TABLET ORAL DAILY
Qty: 90 TABLET | Refills: 2 | Status: SHIPPED | OUTPATIENT
Start: 2019-03-20 | End: 2019-09-24 | Stop reason: SDUPTHER

## 2019-04-10 DIAGNOSIS — I10 HYPERTENSION, UNSPECIFIED TYPE: ICD-10-CM

## 2019-04-10 RX ORDER — METOPROLOL SUCCINATE 25 MG/1
TABLET, EXTENDED RELEASE ORAL
Qty: 45 TABLET | Refills: 1 | Status: SHIPPED | OUTPATIENT
Start: 2019-04-10 | End: 2019-10-28 | Stop reason: SDUPTHER

## 2019-05-14 ENCOUNTER — APPOINTMENT (OUTPATIENT)
Dept: LAB | Facility: CLINIC | Age: 74
End: 2019-05-14
Payer: MEDICARE

## 2019-05-14 DIAGNOSIS — I10 BENIGN ESSENTIAL HYPERTENSION: ICD-10-CM

## 2019-05-14 DIAGNOSIS — E78.2 MIXED HYPERLIPIDEMIA: ICD-10-CM

## 2019-05-14 DIAGNOSIS — I71.2 ANEURYSM, ASCENDING AORTA (HCC): ICD-10-CM

## 2019-05-14 DIAGNOSIS — F41.9 ANXIETY: ICD-10-CM

## 2019-05-14 DIAGNOSIS — E55.9 VITAMIN D DEFICIENCY: ICD-10-CM

## 2019-05-14 DIAGNOSIS — Z12.5 SCREENING FOR PROSTATE CANCER: ICD-10-CM

## 2019-05-14 LAB
25(OH)D3 SERPL-MCNC: 79.5 NG/ML (ref 30–100)
ALBUMIN SERPL BCP-MCNC: 3.9 G/DL (ref 3.5–5)
ALP SERPL-CCNC: 71 U/L (ref 46–116)
ALT SERPL W P-5'-P-CCNC: 26 U/L (ref 12–78)
ANION GAP SERPL CALCULATED.3IONS-SCNC: 8 MMOL/L (ref 4–13)
AST SERPL W P-5'-P-CCNC: 24 U/L (ref 5–45)
BASOPHILS # BLD AUTO: 0.04 THOUSANDS/ΜL (ref 0–0.1)
BASOPHILS NFR BLD AUTO: 1 % (ref 0–1)
BILIRUB SERPL-MCNC: 1.19 MG/DL (ref 0.2–1)
BUN SERPL-MCNC: 14 MG/DL (ref 5–25)
CALCIUM SERPL-MCNC: 8.4 MG/DL (ref 8.3–10.1)
CHLORIDE SERPL-SCNC: 104 MMOL/L (ref 100–108)
CHOLEST SERPL-MCNC: 139 MG/DL (ref 50–200)
CO2 SERPL-SCNC: 25 MMOL/L (ref 21–32)
CREAT SERPL-MCNC: 0.84 MG/DL (ref 0.6–1.3)
EOSINOPHIL # BLD AUTO: 0.04 THOUSAND/ΜL (ref 0–0.61)
EOSINOPHIL NFR BLD AUTO: 1 % (ref 0–6)
ERYTHROCYTE [DISTWIDTH] IN BLOOD BY AUTOMATED COUNT: 14.8 % (ref 11.6–15.1)
GFR SERPL CREATININE-BSD FRML MDRD: 86 ML/MIN/1.73SQ M
GLUCOSE P FAST SERPL-MCNC: 100 MG/DL (ref 65–99)
HCT VFR BLD AUTO: 39.7 % (ref 36.5–49.3)
HDLC SERPL-MCNC: 62 MG/DL (ref 40–60)
HGB BLD-MCNC: 13 G/DL (ref 12–17)
IMM GRANULOCYTES # BLD AUTO: 0.01 THOUSAND/UL (ref 0–0.2)
IMM GRANULOCYTES NFR BLD AUTO: 0 % (ref 0–2)
LDLC SERPL CALC-MCNC: 70 MG/DL (ref 0–100)
LYMPHOCYTES # BLD AUTO: 1.99 THOUSANDS/ΜL (ref 0.6–4.47)
LYMPHOCYTES NFR BLD AUTO: 33 % (ref 14–44)
MCH RBC QN AUTO: 29 PG (ref 26.8–34.3)
MCHC RBC AUTO-ENTMCNC: 32.7 G/DL (ref 31.4–37.4)
MCV RBC AUTO: 88 FL (ref 82–98)
MONOCYTES # BLD AUTO: 0.35 THOUSAND/ΜL (ref 0.17–1.22)
MONOCYTES NFR BLD AUTO: 6 % (ref 4–12)
NEUTROPHILS # BLD AUTO: 3.55 THOUSANDS/ΜL (ref 1.85–7.62)
NEUTS SEG NFR BLD AUTO: 59 % (ref 43–75)
NRBC BLD AUTO-RTO: 0 /100 WBCS
PLATELET # BLD AUTO: 282 THOUSANDS/UL (ref 149–390)
PMV BLD AUTO: 10.8 FL (ref 8.9–12.7)
POTASSIUM SERPL-SCNC: 4.2 MMOL/L (ref 3.5–5.3)
PROT SERPL-MCNC: 6.9 G/DL (ref 6.4–8.2)
RBC # BLD AUTO: 4.49 MILLION/UL (ref 3.88–5.62)
SODIUM SERPL-SCNC: 137 MMOL/L (ref 136–145)
TRIGL SERPL-MCNC: 33 MG/DL
WBC # BLD AUTO: 5.98 THOUSAND/UL (ref 4.31–10.16)

## 2019-05-14 PROCEDURE — 80061 LIPID PANEL: CPT

## 2019-05-14 PROCEDURE — G0103 PSA SCREENING: HCPCS

## 2019-05-14 PROCEDURE — 36415 COLL VENOUS BLD VENIPUNCTURE: CPT

## 2019-05-14 PROCEDURE — 82306 VITAMIN D 25 HYDROXY: CPT

## 2019-05-14 PROCEDURE — 80053 COMPREHEN METABOLIC PANEL: CPT

## 2019-05-14 PROCEDURE — 85025 COMPLETE CBC W/AUTO DIFF WBC: CPT

## 2019-05-15 LAB
PSA FREE MFR SERPL: 20 %
PSA FREE SERPL-MCNC: 0.38 NG/ML
PSA SERPL-MCNC: 1.9 NG/ML (ref 0–4)

## 2019-05-29 ENCOUNTER — OFFICE VISIT (OUTPATIENT)
Dept: FAMILY MEDICINE CLINIC | Facility: CLINIC | Age: 74
End: 2019-05-29
Payer: MEDICARE

## 2019-05-29 VITALS
SYSTOLIC BLOOD PRESSURE: 126 MMHG | HEART RATE: 65 BPM | HEIGHT: 70 IN | DIASTOLIC BLOOD PRESSURE: 68 MMHG | OXYGEN SATURATION: 88 % | WEIGHT: 173.8 LBS | BODY MASS INDEX: 24.88 KG/M2 | TEMPERATURE: 99 F

## 2019-05-29 DIAGNOSIS — R73.03 PREDIABETES: ICD-10-CM

## 2019-05-29 DIAGNOSIS — I48.91 ATRIAL FIBRILLATION, UNSPECIFIED TYPE (HCC): ICD-10-CM

## 2019-05-29 DIAGNOSIS — I10 BENIGN ESSENTIAL HYPERTENSION: ICD-10-CM

## 2019-05-29 DIAGNOSIS — E03.9 HYPOTHYROIDISM, UNSPECIFIED TYPE: ICD-10-CM

## 2019-05-29 DIAGNOSIS — E55.9 VITAMIN D DEFICIENCY: Primary | ICD-10-CM

## 2019-05-29 DIAGNOSIS — F41.9 ANXIETY: ICD-10-CM

## 2019-05-29 DIAGNOSIS — E78.2 MIXED HYPERLIPIDEMIA: ICD-10-CM

## 2019-05-29 PROCEDURE — 99214 OFFICE O/P EST MOD 30 MIN: CPT | Performed by: FAMILY MEDICINE

## 2019-05-29 PROCEDURE — G0439 PPPS, SUBSEQ VISIT: HCPCS | Performed by: FAMILY MEDICINE

## 2019-05-29 RX ORDER — CEPHALEXIN 500 MG/1
CAPSULE ORAL
COMMUNITY
End: 2020-09-08

## 2019-08-07 ENCOUNTER — TELEPHONE (OUTPATIENT)
Dept: FAMILY MEDICINE CLINIC | Facility: CLINIC | Age: 74
End: 2019-08-07

## 2019-08-07 NOTE — TELEPHONE ENCOUNTER
Ana Paula Main was calling he just had routine annual  labs done on 5/14/2019  Pt stated pt has an appointment on 8/28/19 with Moira Murrieta  Will medicare cover this lab work even if he had labs done in may? Dr Laguna ordered a T4 free, Tsh 3rd generation, A1C and cmp and patient is asking would his insurance allow  him to get this will it be covered to have labs drawn again  On 5/14/19 pt had a vitamin D, cbc,psa,lipid and cmp

## 2019-09-19 ENCOUNTER — APPOINTMENT (OUTPATIENT)
Dept: LAB | Facility: CLINIC | Age: 74
End: 2019-09-19
Payer: MEDICARE

## 2019-09-19 ENCOUNTER — IMMUNIZATIONS (OUTPATIENT)
Dept: FAMILY MEDICINE CLINIC | Facility: CLINIC | Age: 74
End: 2019-09-19
Payer: MEDICARE

## 2019-09-19 DIAGNOSIS — I10 BENIGN ESSENTIAL HYPERTENSION: ICD-10-CM

## 2019-09-19 DIAGNOSIS — R73.03 PREDIABETES: ICD-10-CM

## 2019-09-19 DIAGNOSIS — E03.9 HYPOTHYROIDISM, UNSPECIFIED TYPE: ICD-10-CM

## 2019-09-19 DIAGNOSIS — Z23 ENCOUNTER FOR IMMUNIZATION: ICD-10-CM

## 2019-09-19 LAB
ALBUMIN SERPL BCP-MCNC: 4.3 G/DL (ref 3.5–5)
ALP SERPL-CCNC: 77 U/L (ref 46–116)
ALT SERPL W P-5'-P-CCNC: 22 U/L (ref 12–78)
ANION GAP SERPL CALCULATED.3IONS-SCNC: 8 MMOL/L (ref 4–13)
AST SERPL W P-5'-P-CCNC: 22 U/L (ref 5–45)
BILIRUB SERPL-MCNC: 1.51 MG/DL (ref 0.2–1)
BUN SERPL-MCNC: 13 MG/DL (ref 5–25)
CALCIUM SERPL-MCNC: 8.9 MG/DL (ref 8.3–10.1)
CHLORIDE SERPL-SCNC: 105 MMOL/L (ref 100–108)
CO2 SERPL-SCNC: 24 MMOL/L (ref 21–32)
CREAT SERPL-MCNC: 0.79 MG/DL (ref 0.6–1.3)
EST. AVERAGE GLUCOSE BLD GHB EST-MCNC: 120 MG/DL
GFR SERPL CREATININE-BSD FRML MDRD: 88 ML/MIN/1.73SQ M
GLUCOSE P FAST SERPL-MCNC: 98 MG/DL (ref 65–99)
HBA1C MFR BLD: 5.8 % (ref 4.2–6.3)
POTASSIUM SERPL-SCNC: 4.7 MMOL/L (ref 3.5–5.3)
PROT SERPL-MCNC: 6.8 G/DL (ref 6.4–8.2)
SODIUM SERPL-SCNC: 137 MMOL/L (ref 136–145)
T4 FREE SERPL-MCNC: 1.47 NG/DL (ref 0.76–1.46)
TSH SERPL DL<=0.05 MIU/L-ACNC: 0.08 UIU/ML (ref 0.36–3.74)

## 2019-09-19 PROCEDURE — 83036 HEMOGLOBIN GLYCOSYLATED A1C: CPT | Performed by: FAMILY MEDICINE

## 2019-09-19 PROCEDURE — 84439 ASSAY OF FREE THYROXINE: CPT | Performed by: FAMILY MEDICINE

## 2019-09-19 PROCEDURE — 84443 ASSAY THYROID STIM HORMONE: CPT

## 2019-09-19 PROCEDURE — 36415 COLL VENOUS BLD VENIPUNCTURE: CPT | Performed by: FAMILY MEDICINE

## 2019-09-19 PROCEDURE — 80053 COMPREHEN METABOLIC PANEL: CPT

## 2019-09-19 PROCEDURE — 90662 IIV NO PRSV INCREASED AG IM: CPT | Performed by: FAMILY MEDICINE

## 2019-09-19 PROCEDURE — G0008 ADMIN INFLUENZA VIRUS VAC: HCPCS | Performed by: FAMILY MEDICINE

## 2019-09-24 DIAGNOSIS — E03.9 HYPOTHYROIDISM, UNSPECIFIED TYPE: ICD-10-CM

## 2019-09-24 RX ORDER — LEVOTHYROXINE SODIUM 0.15 MG/1
150 TABLET ORAL DAILY
Qty: 90 TABLET | Refills: 3 | Status: SHIPPED | OUTPATIENT
Start: 2019-09-24 | End: 2020-06-24

## 2019-10-28 DIAGNOSIS — I10 HYPERTENSION, UNSPECIFIED TYPE: ICD-10-CM

## 2019-10-28 RX ORDER — METOPROLOL SUCCINATE 25 MG/1
TABLET, EXTENDED RELEASE ORAL
Qty: 45 TABLET | Refills: 1 | Status: SHIPPED | OUTPATIENT
Start: 2019-10-28 | End: 2020-04-13 | Stop reason: SDUPTHER

## 2019-12-03 ENCOUNTER — OFFICE VISIT (OUTPATIENT)
Dept: CARDIOLOGY CLINIC | Facility: CLINIC | Age: 74
End: 2019-12-03
Payer: MEDICARE

## 2019-12-03 VITALS
BODY MASS INDEX: 25.62 KG/M2 | HEIGHT: 69 IN | WEIGHT: 173 LBS | DIASTOLIC BLOOD PRESSURE: 78 MMHG | SYSTOLIC BLOOD PRESSURE: 128 MMHG | HEART RATE: 51 BPM

## 2019-12-03 DIAGNOSIS — R00.1 SINUS BRADYCARDIA: ICD-10-CM

## 2019-12-03 DIAGNOSIS — I48.0 PAROXYSMAL ATRIAL FIBRILLATION (HCC): Primary | ICD-10-CM

## 2019-12-03 DIAGNOSIS — I71.2 ANEURYSM, ASCENDING AORTA (HCC): ICD-10-CM

## 2019-12-03 DIAGNOSIS — E78.2 MIXED HYPERLIPIDEMIA: ICD-10-CM

## 2019-12-03 DIAGNOSIS — I10 BENIGN ESSENTIAL HYPERTENSION: ICD-10-CM

## 2019-12-03 PROCEDURE — 99213 OFFICE O/P EST LOW 20 MIN: CPT | Performed by: INTERNAL MEDICINE

## 2019-12-03 PROCEDURE — 93000 ELECTROCARDIOGRAM COMPLETE: CPT | Performed by: INTERNAL MEDICINE

## 2019-12-03 NOTE — PROGRESS NOTES
Cardiology Follow Up    Love Quinones   1945  [id number]  [facility] CARDIOLOGY [place]  [address] [place] [id number] [phone]    Reason for visit:  9 month follow-up for paroxysmal atrial fibrillation, sick sinus syndrome with sinus bradycardia, hypertension, hyperlipidemia, moderate aortic root enlargement for which he follows with CT surgery and history of DVT      1  Paroxysmal atrial fibrillation (HCC)  POCT ECG   2  Benign essential hypertension     3  Aneurysm, ascending aorta ([address] )     4  Sinus bradycardia  POCT ECG   5  Mixed hyperlipidemia         Interval History:   Since his last visit, he denies chest pain or shortness of breath  His blood pressure readings have been excellent  He does tend to have mild bradycardia on his blood pressure determinations    He denies palpitations, edema or dizziness      Patient Active Problem List   Diagnosis    Aneurysm, ascending aorta (HCC)    Anxiety    Benign essential hypertension    Esophageal reflux    Hyperlipidemia    Paroxysmal atrial fibrillation (HCC)    Postprocedural hypothyroidism    Pulmonary nodules    Sick sinus syndrome (HCC)    Sinus bradycardia    [facility] ([facility]    Vitamin D deficiency    Pre-diabetes    Osteoporosis    Embolism and thrombosis of tibial vein, left (HCC)     Past Medical History:   Diagnosis Date    Arthritis     neck    Atrial fibrillation ([facility] )     last assessed: 7/26/2017    Cancer ([facility] )     skin, thyroid     Disease of thyroid gland     Disorder of epididymis     H/O malignant neoplasm of thyroid     Hyperlipidemia     last assessed: 6/29/2017    Hypertension     Osteopenia     Psychiatric disorder     Pulmonary embolism ([facility] )     last assessed: 7/26/2017     Social History     Socioeconomic History    Marital status: /Civil Union     Spouse name: Not on file    Number of children: Not on file    Years of education: Not on file    Highest education level: Not on file   Occupational History    Not on file   Social Needs    Financial resource strain: Not on file    Food insecurity:     Worry: Not on file     Inability: Not on file    Transportation needs:     Medical: Not on file     Non-medical: Not on file   Tobacco Use    Smoking status: Former Smoker    Smokeless tobacco: Never Used    Tobacco comment: quit years ago   Substance and Sexual Activity    Alcohol use: No    Drug use: No    Sexual activity: Not on file   Lifestyle    Physical activity:     Days per week: Not on file     Minutes per session: Not on file    Stress: Not on file   Relationships    Social connections:     Talks on phone: Not on file     Gets together: Not on file     Attends Congregation service: Not on file     Active member of club or organization: Not on file     Attends meetings of clubs or organizations: Not on file     Relationship status: Not on file    Intimate partner violence:     Fear of current or ex partner: Not on file     Emotionally abused: Not on file     Physically abused: Not on file     Forced sexual activity: Not on file   Other Topics Concern    Not on file   Social History Narrative    Not on file      Family History   Problem Relation Age of Onset    Coronary artery disease Father     Heart attack Father         acute myocardial infarction    Stroke Mother     Hypertension Mother      Past Surgical History:   Procedure Laterality Date    APPENDECTOMY      COLONOSCOPY      complete    CYSTOSCOPY  02/01/2016    diagnostic    LEG SURGERY      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       Current Outpatient Medications:     ALPRAZolam (XANAX) 0 5 mg tablet, Take 1 tablet (0 5 mg total) by mouth daily at bedtime as needed for anxiety, Disp: 90 tablet, Rfl: 0    aspirin (ECOTRIN LOW STRENGTH) 81 mg EC tablet, Take 81 mg by mouth daily, Disp: , Rfl:     levothyroxine 150 mcg tablet, Take 1 tablet (150 mcg total) by mouth daily, Disp: 90 tablet, Rfl: 3    lisinopril-hydrochlorothiazide (PRINZIDE,ZESTORETIC) 20-12 5 MG per tablet, Take 1 tablet by mouth daily, Disp: , Rfl:     metoprolol succinate (TOPROL-XL) 25 mg 24 hr tablet, Take 1/2 tablet by mouth daily (12 5 mg total), Disp: 45 tablet, Rfl: 1    pravastatin (PRAVACHOL) 10 mg tablet, Take 1 tablet (10 mg total) by mouth daily, Disp: 90 tablet, Rfl: 3    rivaroxaban (XARELTO) 20 mg tablet, Take 1 tablet (20 mg total) by mouth daily, Disp: 90 tablet, Rfl: 3    cephalexin (KEFLEX) 500 mg capsule, cephalexin 500 mg capsule, Disp: , Rfl:   Allergies   Allergen Reactions    Penicillins      "nearly passed out"    Percocet [Oxycodone-Acetaminophen]          Review of Systems:  Review of Systems   Constitutional: Negative for activity change, appetite change, fatigue and unexpected weight change  Respiratory: Negative for cough, shortness of breath and wheezing  Cardiovascular: Negative for chest pain, palpitations and leg swelling  Gastrointestinal: Negative for blood in stool, constipation and diarrhea  Genitourinary: Negative for frequency and hematuria  Hematological: Does not bruise/bleed easily  Physical Exam:  Vitals:    12/03/19 1342   BP: 128/78   Pulse: (!) 51   Weight: 78 5 kg (173 lb)   Height: 5' 9" (1 753 m)       Physical Exam   Constitutional: He appears well-developed and well-nourished  No distress  HENT:   Head: Normocephalic and atraumatic  Mouth/Throat: Oropharynx is clear and moist  No oropharyngeal exudate  Eyes: Conjunctivae are normal  No scleral icterus  Neck: Neck supple  Normal carotid pulses and no JVD present  Carotid bruit is not present  No thyromegaly present  Cardiovascular: Regular rhythm  Bradycardia present  Exam reveals no gallop and no friction rub  Murmur (apical blowing) heard  Systolic murmur is present with a grade of 2/6  No diastolic murmur is present    Pulses: Dorsalis pedis pulses are 2+ on the right side, and 2+ on the left side  Posterior tibial pulses are 2+ on the right side, and 2+ on the left side  Pulmonary/Chest: Breath sounds normal  He has no wheezes  He has no rhonchi  He has no rales  Abdominal: Soft  He exhibits no mass  There is no hepatosplenomegaly  There is no tenderness  Musculoskeletal: He exhibits no edema  Discussion/Summary:  1  Paroxysmal atrial fibrillation  In sinus rhythm today  On Xarelto for stroke prophylaxis  Also takes baby aspirin  No excessive bruising or bleeding  On metoprolol 12 5 mg daily for potential rate control  2  Hypertension  Well controlled on low-dose metoprolol and lisinopril HCTZ  3  Moderate aortic root enlargement  Being followed by CT surgery  Last measured at 4 3 cm  4  Sick sinus syndrome/bradycardia  Heart rate 51 today  Generally heart rates have been in excess of 50  Having no symptoms to suggest profound bradycardia or pauses  5  Mixed hyperlipidemia  On low-dose pravastatin  LDL cholesterol 70 with HDL cholesterol 62   Continue same    Follow-up 9 months    Tim Squires MD

## 2019-12-30 ENCOUNTER — OFFICE VISIT (OUTPATIENT)
Dept: FAMILY MEDICINE CLINIC | Facility: CLINIC | Age: 74
End: 2019-12-30
Payer: MEDICARE

## 2019-12-30 VITALS
OXYGEN SATURATION: 98 % | TEMPERATURE: 97.3 F | BODY MASS INDEX: 25.86 KG/M2 | DIASTOLIC BLOOD PRESSURE: 90 MMHG | RESPIRATION RATE: 16 BRPM | HEIGHT: 69 IN | WEIGHT: 174.6 LBS | HEART RATE: 64 BPM | SYSTOLIC BLOOD PRESSURE: 138 MMHG

## 2019-12-30 DIAGNOSIS — I26.99 OTHER PULMONARY EMBOLISM WITHOUT ACUTE COR PULMONALE, UNSPECIFIED CHRONICITY (HCC): ICD-10-CM

## 2019-12-30 DIAGNOSIS — R36.1 BLOODY EJACULATION: ICD-10-CM

## 2019-12-30 DIAGNOSIS — R36.1 BLOODY EJACULATION: Primary | ICD-10-CM

## 2019-12-30 DIAGNOSIS — C73 THYROID CANCER (HCC): ICD-10-CM

## 2019-12-30 DIAGNOSIS — I10 BENIGN ESSENTIAL HYPERTENSION: Primary | ICD-10-CM

## 2019-12-30 DIAGNOSIS — F41.9 ANXIETY: ICD-10-CM

## 2019-12-30 PROCEDURE — 99214 OFFICE O/P EST MOD 30 MIN: CPT | Performed by: FAMILY MEDICINE

## 2019-12-30 RX ORDER — ALPRAZOLAM 0.5 MG/1
0.5 TABLET ORAL
Qty: 90 TABLET | Refills: 3 | Status: SHIPPED | OUTPATIENT
Start: 2019-12-30 | End: 2020-05-06

## 2019-12-30 RX ORDER — CIPROFLOXACIN 500 MG/1
500 TABLET, FILM COATED ORAL EVERY 12 HOURS SCHEDULED
Qty: 10 TABLET | Refills: 0 | Status: SHIPPED | OUTPATIENT
Start: 2019-12-30 | End: 2020-01-04

## 2019-12-30 NOTE — PATIENT INSTRUCTIONS
Blood tinged ejaculation on 3 out of 4 episodes and will rec covering with abx for any prostate infection  Stay well hydrated  Last PSA wnl  Rec urology consult, and call if worse  Of note patient is on Xarelto and baby aspirin for a-fib and hx of DVT left leg 3/7/2017

## 2019-12-30 NOTE — PROGRESS NOTES
Assessment/Plan:  Chief Complaint   Patient presents with    Follow-up     Pt presents for a blood pressure check  Pt states he would like to discuss some prostate issues  Patient Instructions   Blood tinged ejaculation on 3 out of 4 episodes and will rec covering with abx for any prostate infection  Stay well hydrated  Last PSA wnl  Rec urology consult, and call if worse  Of note patient is on Xarelto and baby aspirin for a-fib and hx of DVT left leg 3/7/2017  No problem-specific Assessment & Plan notes found for this encounter  Diagnoses and all orders for this visit:    Benign essential hypertension    Anxiety  -     ALPRAZolam (XANAX) 0 5 mg tablet; Take 1 tablet (0 5 mg total) by mouth daily at bedtime as needed for anxiety    Other pulmonary embolism without acute cor pulmonale, unspecified chronicity (HCC)  -     rivaroxaban (XARELTO) 20 mg tablet; Take 1 tablet (20 mg total) by mouth daily    Bloody ejaculation    Thyroid cancer ([facility] )          Subjective:      Patient ID: Aruna Chinchilla  is a 66 y o  male  Follow-up (Pt presents for a blood pressure check  Pt states he would like to discuss some prostate issues  ) Pt  states ejaculation is pink and urine is clear  Pt  Is on Xarelto and baby aspirin and started noticing blood tinged ejaculation since 12/24/19  Hx of vasectomy many years ago 42 years ago  No other urinary complaints  Last PSA wnl in 2019  The following portions of the patient's history were reviewed and updated as appropriate: allergies, current medications, past family history, past medical history, past social history, past surgical history and problem list     Review of Systems   Constitutional: Negative  HENT: Negative  Eyes: Negative  Respiratory: Negative  Cardiovascular: Negative  Gastrointestinal: Negative  Endocrine: Negative  Genitourinary:        Blood with ejaculation blood tinged 3 times out of 4 since 12/24/19  Musculoskeletal: Negative  Skin: Negative  Allergic/Immunologic: Negative  Neurological: Negative  Hematological: Negative  Psychiatric/Behavioral: Negative  Objective:      /90 (BP Location: Left arm, Patient Position: Sitting, Cuff Size: Large)   Pulse 64   Temp (!) 97 3 °F (36 3 °C) (Temporal)   Resp 16   Ht 5' 9" (1 753 m)   Wt 79 2 kg (174 lb 9 6 oz)   SpO2 98%   BMI 25 78 kg/m²          Physical Exam   Constitutional: He is oriented to person, place, and time  He appears well-developed and well-nourished  HENT:   Head: Normocephalic and atraumatic  Right Ear: External ear normal    Left Ear: External ear normal    Nose: Nose normal    Mouth/Throat: Oropharynx is clear and moist    Eyes: Pupils are equal, round, and reactive to light  Conjunctivae and EOM are normal    Neck: Normal range of motion  Neck supple  Cardiovascular: Normal rate, regular rhythm, normal heart sounds and intact distal pulses  Pulmonary/Chest: Effort normal and breath sounds normal    Abdominal: Soft  Bowel sounds are normal    Musculoskeletal: Normal range of motion  Neurological: He is alert and oriented to person, place, and time  He has normal reflexes  Skin: Skin is warm and dry  Psychiatric: He has a normal mood and affect   His behavior is normal

## 2020-01-06 ENCOUNTER — CONSULT (OUTPATIENT)
Dept: UROLOGY | Facility: MEDICAL CENTER | Age: 75
End: 2020-01-06
Payer: MEDICARE

## 2020-01-06 VITALS
HEIGHT: 69 IN | HEART RATE: 59 BPM | BODY MASS INDEX: 25.77 KG/M2 | SYSTOLIC BLOOD PRESSURE: 142 MMHG | WEIGHT: 174 LBS | DIASTOLIC BLOOD PRESSURE: 88 MMHG

## 2020-01-06 DIAGNOSIS — R36.1 HEMATOSPERMIA: ICD-10-CM

## 2020-01-06 LAB
SL AMB  POCT GLUCOSE, UA: NORMAL
SL AMB LEUKOCYTE ESTERASE,UA: NORMAL
SL AMB POCT BILIRUBIN,UA: NORMAL
SL AMB POCT BLOOD,UA: NORMAL
SL AMB POCT CLARITY,UA: CLEAR
SL AMB POCT COLOR,UA: YELLOW
SL AMB POCT KETONES,UA: NORMAL
SL AMB POCT NITRITE,UA: NORMAL
SL AMB POCT PH,UA: 5
SL AMB POCT SPECIFIC GRAVITY,UA: 1
SL AMB POCT URINE PROTEIN: NORMAL
SL AMB POCT UROBILINOGEN: 0.2

## 2020-01-06 PROCEDURE — 99204 OFFICE O/P NEW MOD 45 MIN: CPT | Performed by: UROLOGY

## 2020-01-06 PROCEDURE — 81003 URINALYSIS AUTO W/O SCOPE: CPT | Performed by: UROLOGY

## 2020-01-06 NOTE — PATIENT INSTRUCTIONS
We discussed hematospermia at length  In the absence of any infection, inflammation, significant hematuria, or palpable abnormalities on rectal exam, this is rarely a significant issue  Hematospermia is best described as an inflammatory condition of the prostate and seminal vesicles which make the semen  The cause of the inflammation is unclear, but it is almost never infection requiring antibiotics  Sometimes the semen is bright red if ejaculation occurs soon after the onset of the inflammation  Other times it is brown or uvaldo, if there has been some time interval     In my experience, often patients are given antibiotics for this condition, in the assumption that prostatitis may be the cause  However, if there are no symptoms, physical findings, or lab evidence of prostatitis, then prostatitis is not the diagnosis  Because hematospermia often lasts for a short while, the patient may think that the antibiotic took care of it, but actually the condition is self-limited in most cases  Patients often read online that it could be a sign of cancer, and I think this is poor advice  I tell patients that in my career, I have seen many cases of prostate cancer, and not one of those patients had hematospermia as a presenting symptom  Some references recommend transrectal ultrasound to look for a cyst, and that is a test we can do if necessary  Hematospermia can recur, weeks apart, or months apart  I have even seen cases in which a patient has blood in the semen for many years  I encourage the patient to be reassured that it is nothing significant, there is no medication required, the condition will run its course, and it is not harmful to him or his partner  I have asked the patient how he feels about my advice  I understand it is hard for some people to see blood and be told there is no treatment necessary    He seems to be comfortable with my advice, and we can certainly follow up at any time in the future if the patient would like

## 2020-01-06 NOTE — PROGRESS NOTES
HISTORY:    One episode of slight pink bloody appearance to his semen two weeks ago  Has never had that before  No irritation, discomfort or anything  No hematuria and no real BPH symptoms  Has been given one course antibiotics by PCP         ASSESSMENT / PLAN:        We discussed hematospermia at length  In the absence of any infection, inflammation, significant hematuria, or palpable abnormalities on rectal exam, this is rarely a significant issue  Hematospermia is best described as an inflammatory condition of the prostate and seminal vesicles which make the semen  The cause of the inflammation is unclear, but it is almost never infection requiring antibiotics  Sometimes the semen is bright red if ejaculation occurs soon after the onset of the inflammation  Other times it is brown or uvaldo, if there has been some time interval     In my experience, often patients are given antibiotics for this condition, in the assumption that prostatitis may be the cause  However, if there are no symptoms, physical findings, or lab evidence of prostatitis, then prostatitis is not the diagnosis  Because hematospermia often lasts for a short while, the patient may think that the antibiotic took care of it, but actually the condition is self-limited in most cases  Patients often read online that it could be a sign of cancer, and I think this is poor advice  I tell patients that in my career, I have seen many cases of prostate cancer, and not one of those patients had hematospermia as a presenting symptom  Some references recommend transrectal ultrasound to look for a cyst, and that is a test we can do if necessary  Hematospermia can recur, weeks apart, or months apart  I have even seen cases in which a patient has blood in the semen for many years    I encourage the patient to be reassured that it is nothing significant, there is no medication required, the condition will run its course, and it is not harmful to him or his partner  I have asked the patient how he feels about my advice  I understand it is hard for some people to see blood and be told there is no treatment necessary  He seems to be comfortable with my advice, and we can certainly follow up at any time in the future if the patient would like  The following portions of the patient's history were reviewed and updated as appropriate: allergies, current medications, past family history, past medical history, past social history, past surgical history and problem list     Review of Systems   All other systems reviewed and are negative  Objective:     Physical Exam   Constitutional: He appears well-developed and well-nourished     Genitourinary:   Genitourinary Comments:  Penis testes normal     Prostate minimally enlarged no nodules    Recent PSA 1 9         0   Lab Value Date/Time    PSA 1 9 05/14/2019 0920    PSA 1 4 05/03/2018 0806    PSA 3 3 03/21/2017 0802    PSA 1 8 03/22/2016 0756    PSA 1 6 03/12/2015 0705   ]  BUN   Date Value Ref Range Status   09/19/2019 13 5 - 25 mg/dL Final   09/08/2015 16 5 - 25 mg/dL Final     Creatinine   Date Value Ref Range Status   09/19/2019 0 79 0 60 - 1 30 mg/dL Final     Comment:     Standardized to IDMS reference method   09/08/2015 0 80 0 60 - 1 30 mg/dL Final     Comment:     Standardized to IDMS reference method     No components found for: CBC      Patient Active Problem List   Diagnosis    Aneurysm, ascending aorta (HCC)    Anxiety    Benign essential hypertension    Esophageal reflux    Hyperlipidemia    Paroxysmal atrial fibrillation (HCC)    Postprocedural hypothyroidism    Pulmonary nodules    Sick sinus syndrome (HCC)    Sinus bradycardia    [facility] ([facility]    Vitamin D deficiency    Pre-diabetes    Osteoporosis    Embolism and thrombosis of tibial vein, left (HCC)    Hematospermia        Diagnoses and all orders for this visit:    Hematospermia  - Ambulatory referral to Urology  -     POCT urine dip auto non-scope           Patient ID: Cait Reynolds  is a 66 y o  male        Current Outpatient Medications:     ALPRAZolam (XANAX) 0 5 mg tablet, Take 1 tablet (0 5 mg total) by mouth daily at bedtime as needed for anxiety, Disp: 90 tablet, Rfl: 3    aspirin (ECOTRIN LOW STRENGTH) 81 mg EC tablet, Take 81 mg by mouth daily, Disp: , Rfl:     levothyroxine 150 mcg tablet, Take 1 tablet (150 mcg total) by mouth daily, Disp: 90 tablet, Rfl: 3    lisinopril-hydrochlorothiazide (PRINZIDE,ZESTORETIC) 20-12 5 MG per tablet, Take 1 tablet by mouth daily, Disp: , Rfl:     metoprolol succinate (TOPROL-XL) 25 mg 24 hr tablet, Take 1/2 tablet by mouth daily (12 5 mg total), Disp: 45 tablet, Rfl: 1    pravastatin (PRAVACHOL) 10 mg tablet, Take 1 tablet (10 mg total) by mouth daily, Disp: 90 tablet, Rfl: 3    rivaroxaban (XARELTO) 20 mg tablet, Take 1 tablet (20 mg total) by mouth daily, Disp: 90 tablet, Rfl: 3    cephalexin (KEFLEX) 500 mg capsule, cephalexin 500 mg capsule, Disp: , Rfl:     Past Medical History:   Diagnosis Date    Arthritis     neck    Atrial fibrillation (HCC)     last assessed: 7/26/2017    Cancer (HCC)     skin, thyroid     Disease of thyroid gland     Disorder of epididymis     H/O malignant neoplasm of thyroid     Hyperlipidemia     last assessed: 6/29/2017    Hypertension     Osteopenia     Psychiatric disorder     Pulmonary embolism ([facility] [address] )     last assessed: 7/26/2017       Past Surgical History:   Procedure Laterality Date    APPENDECTOMY      COLONOSCOPY      complete    CYSTOSCOPY  02/01/2016    diagnostic    LEG SURGERY      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

## 2020-04-13 DIAGNOSIS — I10 BENIGN ESSENTIAL HYPERTENSION: ICD-10-CM

## 2020-04-13 DIAGNOSIS — I10 HYPERTENSION, UNSPECIFIED TYPE: ICD-10-CM

## 2020-04-13 RX ORDER — LISINOPRIL AND HYDROCHLOROTHIAZIDE 20; 12.5 MG/1; MG/1
1 TABLET ORAL DAILY
Qty: 90 TABLET | Refills: 3 | Status: SHIPPED | OUTPATIENT
Start: 2020-04-13 | End: 2020-11-12

## 2020-04-13 RX ORDER — METOPROLOL SUCCINATE 25 MG/1
TABLET, EXTENDED RELEASE ORAL
Qty: 45 TABLET | Refills: 3 | Status: SHIPPED | OUTPATIENT
Start: 2020-04-13 | End: 2020-10-26 | Stop reason: SDUPTHER

## 2020-04-19 ENCOUNTER — NURSE TRIAGE (OUTPATIENT)
Dept: OTHER | Facility: OTHER | Age: 75
End: 2020-04-19

## 2020-04-20 ENCOUNTER — TELEPHONE (OUTPATIENT)
Dept: CARDIOLOGY CLINIC | Facility: CLINIC | Age: 75
End: 2020-04-20

## 2020-04-20 ENCOUNTER — TELEMEDICINE (OUTPATIENT)
Dept: FAMILY MEDICINE CLINIC | Facility: CLINIC | Age: 75
End: 2020-04-20
Payer: MEDICARE

## 2020-04-20 DIAGNOSIS — I71.2 ANEURYSM, ASCENDING AORTA (HCC): ICD-10-CM

## 2020-04-20 DIAGNOSIS — I10 BENIGN ESSENTIAL HYPERTENSION: ICD-10-CM

## 2020-04-20 DIAGNOSIS — I48.0 PAROXYSMAL ATRIAL FIBRILLATION (HCC): Primary | ICD-10-CM

## 2020-04-20 DIAGNOSIS — F41.9 ANXIETY: ICD-10-CM

## 2020-04-20 DIAGNOSIS — E03.9 HYPOTHYROIDISM, UNSPECIFIED TYPE: ICD-10-CM

## 2020-04-20 PROCEDURE — 99442 PR PHYS/QHP TELEPHONE EVALUATION 11-20 MIN: CPT | Performed by: FAMILY MEDICINE

## 2020-04-29 ENCOUNTER — TELEPHONE (OUTPATIENT)
Dept: FAMILY MEDICINE CLINIC | Facility: CLINIC | Age: 75
End: 2020-04-29

## 2020-04-29 ENCOUNTER — TELEMEDICINE (OUTPATIENT)
Dept: FAMILY MEDICINE CLINIC | Facility: CLINIC | Age: 75
End: 2020-04-29
Payer: MEDICARE

## 2020-04-29 DIAGNOSIS — I71.2 ANEURYSM, ASCENDING AORTA (HCC): ICD-10-CM

## 2020-04-29 DIAGNOSIS — I48.0 PAROXYSMAL ATRIAL FIBRILLATION (HCC): ICD-10-CM

## 2020-04-29 DIAGNOSIS — E55.9 VITAMIN D DEFICIENCY: Primary | ICD-10-CM

## 2020-04-29 DIAGNOSIS — I10 BENIGN ESSENTIAL HYPERTENSION: ICD-10-CM

## 2020-04-29 DIAGNOSIS — R73.03 PRE-DIABETES: ICD-10-CM

## 2020-04-29 PROCEDURE — 99442 PR PHYS/QHP TELEPHONE EVALUATION 11-20 MIN: CPT | Performed by: FAMILY MEDICINE

## 2020-05-06 ENCOUNTER — TELEMEDICINE (OUTPATIENT)
Dept: FAMILY MEDICINE CLINIC | Facility: CLINIC | Age: 75
End: 2020-05-06
Payer: MEDICARE

## 2020-05-06 DIAGNOSIS — F41.9 ANXIETY: Primary | ICD-10-CM

## 2020-05-06 DIAGNOSIS — I71.2 ANEURYSM, ASCENDING AORTA (HCC): ICD-10-CM

## 2020-05-06 DIAGNOSIS — I10 BENIGN ESSENTIAL HYPERTENSION: ICD-10-CM

## 2020-05-06 PROCEDURE — 99213 OFFICE O/P EST LOW 20 MIN: CPT | Performed by: FAMILY MEDICINE

## 2020-05-06 RX ORDER — LORAZEPAM 0.5 MG/1
0.5 TABLET ORAL 2 TIMES DAILY PRN
Qty: 30 TABLET | Refills: 0 | Status: SHIPPED | OUTPATIENT
Start: 2020-05-06 | End: 2020-05-13 | Stop reason: SDUPTHER

## 2020-05-13 ENCOUNTER — TELEMEDICINE (OUTPATIENT)
Dept: FAMILY MEDICINE CLINIC | Facility: CLINIC | Age: 75
End: 2020-05-13
Payer: MEDICARE

## 2020-05-13 DIAGNOSIS — Z12.5 SCREENING FOR PROSTATE CANCER: ICD-10-CM

## 2020-05-13 DIAGNOSIS — E78.5 HYPERLIPIDEMIA, UNSPECIFIED HYPERLIPIDEMIA TYPE: ICD-10-CM

## 2020-05-13 DIAGNOSIS — I71.2 ANEURYSM, ASCENDING AORTA (HCC): ICD-10-CM

## 2020-05-13 DIAGNOSIS — I10 ESSENTIAL HYPERTENSION: ICD-10-CM

## 2020-05-13 DIAGNOSIS — F41.9 ANXIETY: Primary | ICD-10-CM

## 2020-05-13 PROCEDURE — 99442 PR PHYS/QHP TELEPHONE EVALUATION 11-20 MIN: CPT | Performed by: FAMILY MEDICINE

## 2020-05-13 RX ORDER — LORAZEPAM 0.5 MG/1
0.5 TABLET ORAL 2 TIMES DAILY PRN
Qty: 60 TABLET | Refills: 0 | Status: SHIPPED | OUTPATIENT
Start: 2020-05-13 | End: 2020-06-24 | Stop reason: SDUPTHER

## 2020-06-15 DIAGNOSIS — E78.2 MIXED HYPERLIPIDEMIA: ICD-10-CM

## 2020-06-15 RX ORDER — PRAVASTATIN SODIUM 10 MG
10 TABLET ORAL DAILY
Qty: 90 TABLET | Refills: 3 | Status: SHIPPED | OUTPATIENT
Start: 2020-06-15 | End: 2021-05-18

## 2020-06-16 ENCOUNTER — APPOINTMENT (OUTPATIENT)
Dept: LAB | Facility: HOSPITAL | Age: 75
End: 2020-06-16
Payer: MEDICARE

## 2020-06-16 DIAGNOSIS — C73 THYROID CANCER (HCC): Primary | ICD-10-CM

## 2020-06-16 DIAGNOSIS — I10 ESSENTIAL HYPERTENSION: ICD-10-CM

## 2020-06-16 DIAGNOSIS — E03.9 HYPOTHYROIDISM, UNSPECIFIED TYPE: ICD-10-CM

## 2020-06-16 DIAGNOSIS — Z12.5 SCREENING FOR PROSTATE CANCER: ICD-10-CM

## 2020-06-16 DIAGNOSIS — F41.9 ANXIETY: ICD-10-CM

## 2020-06-16 DIAGNOSIS — I71.2 ANEURYSM, ASCENDING AORTA (HCC): ICD-10-CM

## 2020-06-16 DIAGNOSIS — E78.5 HYPERLIPIDEMIA, UNSPECIFIED HYPERLIPIDEMIA TYPE: ICD-10-CM

## 2020-06-16 LAB
ALBUMIN SERPL BCP-MCNC: 3.9 G/DL (ref 3.5–5)
ALP SERPL-CCNC: 62 U/L (ref 46–116)
ALT SERPL W P-5'-P-CCNC: 19 U/L (ref 12–78)
ANION GAP SERPL CALCULATED.3IONS-SCNC: 8 MMOL/L (ref 4–13)
AST SERPL W P-5'-P-CCNC: 24 U/L (ref 5–45)
BASOPHILS # BLD AUTO: 0.06 THOUSANDS/ΜL (ref 0–0.1)
BASOPHILS NFR BLD AUTO: 1 % (ref 0–1)
BILIRUB SERPL-MCNC: 1.35 MG/DL (ref 0.2–1)
BUN SERPL-MCNC: 17 MG/DL (ref 5–25)
CALCIUM SERPL-MCNC: 8.5 MG/DL (ref 8.3–10.1)
CHLORIDE SERPL-SCNC: 99 MMOL/L (ref 100–108)
CHOLEST SERPL-MCNC: 143 MG/DL (ref 50–200)
CO2 SERPL-SCNC: 27 MMOL/L (ref 21–32)
CREAT SERPL-MCNC: 0.78 MG/DL (ref 0.6–1.3)
EOSINOPHIL # BLD AUTO: 0.04 THOUSAND/ΜL (ref 0–0.61)
EOSINOPHIL NFR BLD AUTO: 1 % (ref 0–6)
ERYTHROCYTE [DISTWIDTH] IN BLOOD BY AUTOMATED COUNT: 14.7 % (ref 11.6–15.1)
GFR SERPL CREATININE-BSD FRML MDRD: 88 ML/MIN/1.73SQ M
GLUCOSE P FAST SERPL-MCNC: 113 MG/DL (ref 65–99)
HCT VFR BLD AUTO: 41 % (ref 36.5–49.3)
HDLC SERPL-MCNC: 75 MG/DL
HGB BLD-MCNC: 13.3 G/DL (ref 12–17)
IMM GRANULOCYTES # BLD AUTO: 0.01 THOUSAND/UL (ref 0–0.2)
IMM GRANULOCYTES NFR BLD AUTO: 0 % (ref 0–2)
LDLC SERPL CALC-MCNC: 63 MG/DL (ref 0–100)
LYMPHOCYTES # BLD AUTO: 1.95 THOUSANDS/ΜL (ref 0.6–4.47)
LYMPHOCYTES NFR BLD AUTO: 38 % (ref 14–44)
MCH RBC QN AUTO: 28.5 PG (ref 26.8–34.3)
MCHC RBC AUTO-ENTMCNC: 32.4 G/DL (ref 31.4–37.4)
MCV RBC AUTO: 88 FL (ref 82–98)
MONOCYTES # BLD AUTO: 0.56 THOUSAND/ΜL (ref 0.17–1.22)
MONOCYTES NFR BLD AUTO: 11 % (ref 4–12)
NEUTROPHILS # BLD AUTO: 2.57 THOUSANDS/ΜL (ref 1.85–7.62)
NEUTS SEG NFR BLD AUTO: 49 % (ref 43–75)
NRBC BLD AUTO-RTO: 0 /100 WBCS
PLATELET # BLD AUTO: 249 THOUSANDS/UL (ref 149–390)
PMV BLD AUTO: 9.8 FL (ref 8.9–12.7)
POTASSIUM SERPL-SCNC: 4.6 MMOL/L (ref 3.5–5.3)
PROT SERPL-MCNC: 6.9 G/DL (ref 6.4–8.2)
RBC # BLD AUTO: 4.66 MILLION/UL (ref 3.88–5.62)
SODIUM SERPL-SCNC: 134 MMOL/L (ref 136–145)
T4 FREE SERPL-MCNC: 1.36 NG/DL (ref 0.76–1.46)
TRIGL SERPL-MCNC: 25 MG/DL
TSH SERPL DL<=0.05 MIU/L-ACNC: 0.19 UIU/ML (ref 0.36–3.74)
WBC # BLD AUTO: 5.19 THOUSAND/UL (ref 4.31–10.16)

## 2020-06-16 PROCEDURE — 80061 LIPID PANEL: CPT

## 2020-06-16 PROCEDURE — 84439 ASSAY OF FREE THYROXINE: CPT | Performed by: FAMILY MEDICINE

## 2020-06-16 PROCEDURE — G0103 PSA SCREENING: HCPCS

## 2020-06-16 PROCEDURE — 36415 COLL VENOUS BLD VENIPUNCTURE: CPT | Performed by: FAMILY MEDICINE

## 2020-06-16 PROCEDURE — 84443 ASSAY THYROID STIM HORMONE: CPT

## 2020-06-16 PROCEDURE — 85025 COMPLETE CBC W/AUTO DIFF WBC: CPT

## 2020-06-16 PROCEDURE — 80053 COMPREHEN METABOLIC PANEL: CPT

## 2020-06-16 RX ORDER — LEVOTHYROXINE SODIUM 0.12 MG/1
125 TABLET ORAL
Qty: 90 TABLET | Refills: 3 | Status: SHIPPED | OUTPATIENT
Start: 2020-06-16 | End: 2021-05-25

## 2020-06-19 LAB
PSA FREE MFR SERPL: 20 %
PSA FREE SERPL-MCNC: 0.28 NG/ML
PSA SERPL-MCNC: 1.4 NG/ML (ref 0–4)

## 2020-06-24 ENCOUNTER — OFFICE VISIT (OUTPATIENT)
Dept: FAMILY MEDICINE CLINIC | Facility: CLINIC | Age: 75
End: 2020-06-24
Payer: MEDICARE

## 2020-06-24 VITALS
DIASTOLIC BLOOD PRESSURE: 80 MMHG | BODY MASS INDEX: 25.27 KG/M2 | SYSTOLIC BLOOD PRESSURE: 138 MMHG | WEIGHT: 170.6 LBS | RESPIRATION RATE: 16 BRPM | TEMPERATURE: 98.8 F | HEIGHT: 69 IN | HEART RATE: 66 BPM

## 2020-06-24 DIAGNOSIS — E03.9 HYPOTHYROIDISM, UNSPECIFIED TYPE: ICD-10-CM

## 2020-06-24 DIAGNOSIS — E55.9 VITAMIN D DEFICIENCY: Primary | ICD-10-CM

## 2020-06-24 DIAGNOSIS — R73.03 PRE-DIABETES: ICD-10-CM

## 2020-06-24 DIAGNOSIS — I10 BENIGN ESSENTIAL HYPERTENSION: ICD-10-CM

## 2020-06-24 DIAGNOSIS — I26.99 OTHER PULMONARY EMBOLISM WITHOUT ACUTE COR PULMONALE, UNSPECIFIED CHRONICITY (HCC): ICD-10-CM

## 2020-06-24 DIAGNOSIS — E78.2 MIXED HYPERLIPIDEMIA: ICD-10-CM

## 2020-06-24 DIAGNOSIS — I48.0 PAROXYSMAL ATRIAL FIBRILLATION (HCC): ICD-10-CM

## 2020-06-24 DIAGNOSIS — C73 THYROID CANCER (HCC): ICD-10-CM

## 2020-06-24 DIAGNOSIS — F41.9 ANXIETY: ICD-10-CM

## 2020-06-24 PROCEDURE — G0439 PPPS, SUBSEQ VISIT: HCPCS | Performed by: FAMILY MEDICINE

## 2020-06-24 PROCEDURE — 3079F DIAST BP 80-89 MM HG: CPT | Performed by: FAMILY MEDICINE

## 2020-06-24 PROCEDURE — 1125F AMNT PAIN NOTED PAIN PRSNT: CPT | Performed by: FAMILY MEDICINE

## 2020-06-24 PROCEDURE — 99214 OFFICE O/P EST MOD 30 MIN: CPT | Performed by: FAMILY MEDICINE

## 2020-06-24 PROCEDURE — 1036F TOBACCO NON-USER: CPT | Performed by: FAMILY MEDICINE

## 2020-06-24 PROCEDURE — 3008F BODY MASS INDEX DOCD: CPT | Performed by: FAMILY MEDICINE

## 2020-06-24 PROCEDURE — 4040F PNEUMOC VAC/ADMIN/RCVD: CPT | Performed by: FAMILY MEDICINE

## 2020-06-24 PROCEDURE — 1160F RVW MEDS BY RX/DR IN RCRD: CPT | Performed by: FAMILY MEDICINE

## 2020-06-24 PROCEDURE — 1170F FXNL STATUS ASSESSED: CPT | Performed by: FAMILY MEDICINE

## 2020-06-24 PROCEDURE — 3075F SYST BP GE 130 - 139MM HG: CPT | Performed by: FAMILY MEDICINE

## 2020-06-24 RX ORDER — LORAZEPAM 0.5 MG/1
0.5 TABLET ORAL 2 TIMES DAILY PRN
Qty: 180 TABLET | Refills: 0 | Status: SHIPPED | OUTPATIENT
Start: 2020-06-24 | End: 2020-09-08

## 2020-09-01 ENCOUNTER — APPOINTMENT (OUTPATIENT)
Dept: LAB | Facility: HOSPITAL | Age: 75
End: 2020-09-01
Payer: MEDICARE

## 2020-09-01 DIAGNOSIS — E03.9 HYPOTHYROIDISM, UNSPECIFIED TYPE: ICD-10-CM

## 2020-09-01 DIAGNOSIS — E55.9 VITAMIN D DEFICIENCY: ICD-10-CM

## 2020-09-01 LAB
25(OH)D3 SERPL-MCNC: 65.7 NG/ML (ref 30–100)
T4 FREE SERPL-MCNC: 1.03 NG/DL (ref 0.76–1.46)
TSH SERPL DL<=0.05 MIU/L-ACNC: 2.42 UIU/ML (ref 0.36–3.74)

## 2020-09-01 PROCEDURE — 84439 ASSAY OF FREE THYROXINE: CPT | Performed by: FAMILY MEDICINE

## 2020-09-01 PROCEDURE — 36415 COLL VENOUS BLD VENIPUNCTURE: CPT | Performed by: FAMILY MEDICINE

## 2020-09-01 PROCEDURE — 82306 VITAMIN D 25 HYDROXY: CPT

## 2020-09-01 PROCEDURE — 84443 ASSAY THYROID STIM HORMONE: CPT

## 2020-09-08 ENCOUNTER — OFFICE VISIT (OUTPATIENT)
Dept: CARDIOLOGY CLINIC | Facility: CLINIC | Age: 75
End: 2020-09-08
Payer: MEDICARE

## 2020-09-08 VITALS
HEIGHT: 69 IN | SYSTOLIC BLOOD PRESSURE: 140 MMHG | WEIGHT: 173.4 LBS | OXYGEN SATURATION: 97 % | DIASTOLIC BLOOD PRESSURE: 80 MMHG | TEMPERATURE: 98 F | BODY MASS INDEX: 25.68 KG/M2 | HEART RATE: 60 BPM

## 2020-09-08 DIAGNOSIS — I71.2 ANEURYSM, ASCENDING AORTA (HCC): ICD-10-CM

## 2020-09-08 DIAGNOSIS — I10 BENIGN ESSENTIAL HYPERTENSION: ICD-10-CM

## 2020-09-08 DIAGNOSIS — E78.2 MIXED HYPERLIPIDEMIA: ICD-10-CM

## 2020-09-08 DIAGNOSIS — I48.0 PAROXYSMAL ATRIAL FIBRILLATION (HCC): Primary | ICD-10-CM

## 2020-09-08 DIAGNOSIS — R00.1 SINUS BRADYCARDIA: ICD-10-CM

## 2020-09-08 PROCEDURE — 99213 OFFICE O/P EST LOW 20 MIN: CPT | Performed by: INTERNAL MEDICINE

## 2020-09-08 RX ORDER — ALPRAZOLAM 0.5 MG/1
TABLET ORAL
COMMUNITY
End: 2020-09-24

## 2020-09-08 NOTE — PROGRESS NOTES
Cardiology Follow Up    Robinson Strauss   1945  [id number]  [facility] CARDIOLOGY [place]  [address] [facility] [address] [phone]    Reason for visit:  9 month follow-up for paroxysmal atrial fibrillation, sick sinus syndrome with sinus bradycardia, hypertension, hyperlipidemia, moderate aortic root enlargement for which he follows with CT surgery and history of DVT      1  Paroxysmal atrial fibrillation (HCC)     2  Sinus bradycardia     3  Aneurysm, ascending aorta ([address] )     4  Benign essential hypertension     5  Mixed hyperlipidemia         Interval History: The patient had BP elevations in April likely due to anxiety  Ankur Heckler He was placed on Xanax which brought his BP down and they have been excellent    She denies CP, SOB, palpitations, edema or lightheadedness       Patient Active Problem List   Diagnosis    Aneurysm, ascending aorta (HCC)    Anxiety    Benign essential hypertension    Esophageal reflux    Hyperlipidemia    Paroxysmal atrial fibrillation (HCC)    Postprocedural hypothyroidism    Pulmonary nodules    Sick sinus syndrome (HCC)    Sinus bradycardia    Thyroid cancer ([facility] [address] )    Vitamin D deficiency    Pre-diabetes    Osteoporosis    Embolism and thrombosis of tibial vein, left (HCC)    Hematospermia    Other pulmonary embolism without acute cor pulmonale (HCC)     Past Medical History:   Diagnosis Date    Arthritis     neck    Atrial fibrillation ([address] )     last assessed: 7/26/2017    Cancer (HCC)     skin, thyroid     Disease of thyroid gland     Disorder of epididymis     H/O malignant neoplasm of thyroid     Hyperlipidemia     last assessed: 6/29/2017    Hypertension     Osteopenia     Psychiatric disorder     Pulmonary embolism ([address] )     last assessed: 7/26/2017     Social History     Socioeconomic History    Marital status: /Civil Union     Spouse name: Not on file    Number of children: Not on file    Years of education: Not on file    Highest education level: Not on file   Occupational History    Not on file   Social Needs    Financial resource strain: Not on file    Food insecurity     Worry: Not on file     Inability: Not on file    Transportation needs     Medical: Not on file     Non-medical: Not on file   Tobacco Use    Smoking status: Former Smoker    Smokeless tobacco: Never Used    Tobacco comment: quit years ago   Substance and Sexual Activity    Alcohol use: No    Drug use: No    Sexual activity: Not on file   Lifestyle    Physical activity     Days per week: Not on file     Minutes per session: Not on file    Stress: Not on file   Relationships    Social connections     Talks on phone: Not on file     Gets together: Not on file     Attends Jain service: Not on file     Active member of club or organization: Not on file     Attends meetings of clubs or organizations: Not on file     Relationship status: Not on file    Intimate partner violence     Fear of current or ex partner: Not on file     Emotionally abused: Not on file     Physically abused: Not on file     Forced sexual activity: Not on file   Other Topics Concern    Not on file   Social History Narrative    Not on file      Family History   Problem Relation Age of Onset    Coronary artery disease Father     Heart attack Father         acute myocardial infarction    Stroke Mother     Hypertension Mother      Past Surgical History:   Procedure Laterality Date    APPENDECTOMY      COLONOSCOPY      complete    CYSTOSCOPY  02/01/2016    diagnostic    LEG SURGERY      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       Current Outpatient Medications:     ALPRAZolam (XANAX) 0 5 mg tablet, Take by mouth daily at bedtime as needed for anxiety, Disp: , Rfl:     aspirin (ECOTRIN LOW STRENGTH) 81 mg EC tablet, Take 81 mg by mouth daily, Disp: , Rfl:     levothyroxine 125 mcg tablet, Take 1 tablet (125 mcg total) by mouth daily in the early morning, Disp: 90 tablet, Rfl: 3    lisinopril-hydrochlorothiazide (PRINZIDE,ZESTORETIC) 20-12 5 MG per tablet, Take 1 tablet by mouth daily, Disp: 90 tablet, Rfl: 3    metoprolol succinate (TOPROL-XL) 25 mg 24 hr tablet, Take 1/2 tablet by mouth daily (12 5 mg total), Disp: 45 tablet, Rfl: 3    pravastatin (PRAVACHOL) 10 mg tablet, Take 1 tablet (10 mg total) by mouth daily, Disp: 90 tablet, Rfl: 3    rivaroxaban (XARELTO) 20 mg tablet, Take 1 tablet (20 mg total) by mouth daily, Disp: 90 tablet, Rfl: 3  Allergies   Allergen Reactions    Penicillins      "nearly passed out"    Percocet [Oxycodone-Acetaminophen]        Review of Systems:  Review of Systems   Constitutional: Negative for appetite change, fatigue and unexpected weight change  Respiratory: Negative for cough, shortness of breath and wheezing  Cardiovascular: Negative for chest pain, palpitations and leg swelling  Gastrointestinal: Negative for abdominal pain, blood in stool, constipation and diarrhea  Genitourinary: Negative for frequency and hematuria  Musculoskeletal: Positive for arthralgias  Negative for back pain  Neurological: Negative for dizziness, light-headedness and headaches  Physical Exam:  Vitals:    09/08/20 1345   BP: 140/80   Pulse: 60   Temp: 98 °F (36 7 °C)   SpO2: 97%   Weight: 78 7 kg (173 lb 6 4 oz)   Height: 5' 9" (1 753 m)       Physical Exam  Constitutional:       General: He is not in acute distress  Appearance: Normal appearance  He is not ill-appearing  HENT:      Head: Normocephalic and atraumatic  Eyes:      General: No scleral icterus  Conjunctiva/sclera: Conjunctivae normal    Neck:      Musculoskeletal: Neck supple  Thyroid: No thyromegaly  Vascular: Normal carotid pulses  No carotid bruit or JVD  Cardiovascular:      Rate and Rhythm: Regular rhythm  Bradycardia present  Pulses: Normal pulses        Heart sounds: Murmur (grade 1-2 apical systolic murmur) present  No friction rub  No gallop  Pulmonary:      Breath sounds: Normal breath sounds  No wheezing, rhonchi or rales  Abdominal:      Palpations: Abdomen is soft  There is no hepatomegaly, splenomegaly or mass  Tenderness: There is no abdominal tenderness  Musculoskeletal:         General: No swelling  Discussion/Summary:  1  PAF-in normal sinus rhythm  On low-dose beta-blocker for potential rate control  On Xarelto for stroke prophylaxis  2  Sinus bradycardia-mild  No significant bradycardia arrhythmias noted  3  TAA-aorta last measured 4 3 cm  To have repeat scanning early next year at the direction of CT surgery  4  HTN-quite well controlled now on lisinopril HCTZ and low-dose metoprolol  Continue same  5  Mixed HLP-LDL cholesterol 63 with HDL cholesterol 75 on pravastatin    Continue same    Follow-up 9 months    Chele Adair MD

## 2020-09-24 ENCOUNTER — OFFICE VISIT (OUTPATIENT)
Dept: FAMILY MEDICINE CLINIC | Facility: CLINIC | Age: 75
End: 2020-09-24
Payer: MEDICARE

## 2020-09-24 VITALS
HEIGHT: 69 IN | RESPIRATION RATE: 16 BRPM | WEIGHT: 173.2 LBS | DIASTOLIC BLOOD PRESSURE: 80 MMHG | OXYGEN SATURATION: 99 % | HEART RATE: 71 BPM | SYSTOLIC BLOOD PRESSURE: 140 MMHG | TEMPERATURE: 97.7 F | BODY MASS INDEX: 25.65 KG/M2

## 2020-09-24 DIAGNOSIS — I48.0 PAROXYSMAL ATRIAL FIBRILLATION (HCC): ICD-10-CM

## 2020-09-24 DIAGNOSIS — F41.9 ANXIETY: ICD-10-CM

## 2020-09-24 DIAGNOSIS — I10 BENIGN ESSENTIAL HYPERTENSION: ICD-10-CM

## 2020-09-24 DIAGNOSIS — E89.0 POSTPROCEDURAL HYPOTHYROIDISM: ICD-10-CM

## 2020-09-24 DIAGNOSIS — E78.2 MIXED HYPERLIPIDEMIA: ICD-10-CM

## 2020-09-24 DIAGNOSIS — N52.9 ERECTILE DYSFUNCTION, UNSPECIFIED ERECTILE DYSFUNCTION TYPE: ICD-10-CM

## 2020-09-24 DIAGNOSIS — Z23 FLU VACCINE NEED: Primary | ICD-10-CM

## 2020-09-24 PROCEDURE — 90662 IIV NO PRSV INCREASED AG IM: CPT | Performed by: FAMILY MEDICINE

## 2020-09-24 PROCEDURE — G0008 ADMIN INFLUENZA VIRUS VAC: HCPCS | Performed by: FAMILY MEDICINE

## 2020-09-24 PROCEDURE — 99214 OFFICE O/P EST MOD 30 MIN: CPT | Performed by: FAMILY MEDICINE

## 2020-09-24 RX ORDER — CITALOPRAM 10 MG/1
10 TABLET ORAL DAILY
Qty: 30 TABLET | Refills: 5 | Status: SHIPPED | OUTPATIENT
Start: 2020-09-24 | End: 2020-11-09

## 2020-09-24 RX ORDER — LORAZEPAM 0.5 MG/1
0.5 TABLET ORAL 2 TIMES DAILY
COMMUNITY
End: 2020-11-09

## 2020-09-24 NOTE — PATIENT INSTRUCTIONS
Here for recheck and routine medical office visit and will need to take all meds as directed  Take meds as directed for anxiety and will get labs checked for hypothyroidism in 8 weeks with vitamin D level  Recheck in 1 months as directed and follow CDC guidelines for prevention of COVID 19  Call if any problems  Previous labs and psa and lipids stable, monitor BS and impaired fasting glucose and hx of prediabetes  Flu shot today  Patient worried about things which can cause bp elevation  Rec starting Citalopram 10 mg daily  He worries about ED and refuses Viagra today and will rec urologist consult if ED continues

## 2020-09-24 NOTE — PROGRESS NOTES
Assessment/Plan:  Chief Complaint   Patient presents with    Follow-up     3 month followup      Patient Instructions   Here for recheck and routine medical office visit and will need to take all meds as directed  Take meds as directed for anxiety and will get labs checked for hypothyroidism in 8 weeks with vitamin D level  Recheck in 3 months as directed and follow CDC guidelines for prevention of COVID 19  Call if any problems  Previous labs and psa and lipids stable, monitor BS and impaired fasting glucose and hx of prediabetes  Flu shot today  Patient worried about things which can cause bp elevation  Rec starting Citalopram 10 mg daily  He worries about ED and refuses Viagra today and will rec urologist consult if ED continues  No problem-specific Assessment & Plan notes found for this encounter  Diagnoses and all orders for this visit:    Anxiety  -     citalopram (CeleXA) 10 mg tablet; Take 1 tablet (10 mg total) by mouth daily    Benign essential hypertension    Mixed hyperlipidemia    Paroxysmal atrial fibrillation (HCC)    Postprocedural hypothyroidism    Erectile dysfunction, unspecified erectile dysfunction type    Other orders  -     LORazepam (ATIVAN) 0 5 mg tablet; Take 0 5 mg by mouth 2 (two) times a day          Subjective:      Patient ID: Edie Bauer  is a 66 y o  male  Follow-up (3 month followup ) Takes all meds as directed for anxiety and BP  No cp or sob, or ha  Has some anxiety at times that increases BP and has been stable until recently due to anxiety  The following portions of the patient's history were reviewed and updated as appropriate: allergies, current medications, past family history, past medical history, past social history, past surgical history and problem list     Review of Systems   Constitutional: Negative  HENT: Negative  Eyes: Negative  Respiratory: Negative  Cardiovascular: Negative  Gastrointestinal: Negative      Endocrine: Negative  Genitourinary: Negative  Musculoskeletal: Negative  Skin: Negative  Allergic/Immunologic: Negative  Neurological: Negative  Hematological: Negative  Psychiatric/Behavioral:        Anxiety         Objective:      /80   Pulse 71   Temp 97 7 °F (36 5 °C) (Temporal)   Resp 16   Ht 5' 9" (1 753 m)   Wt 78 6 kg (173 lb 3 2 oz)   SpO2 99%   BMI 25 58 kg/m²          Physical Exam  Constitutional:       Appearance: He is well-developed  HENT:      Head: Normocephalic and atraumatic  Right Ear: External ear normal       Left Ear: External ear normal       Nose: Nose normal    Eyes:      Conjunctiva/sclera: Conjunctivae normal       Pupils: Pupils are equal, round, and reactive to light  Neck:      Musculoskeletal: Normal range of motion and neck supple  Cardiovascular:      Rate and Rhythm: Normal rate and regular rhythm  Heart sounds: Normal heart sounds  Pulmonary:      Effort: Pulmonary effort is normal       Breath sounds: Normal breath sounds  Musculoskeletal: Normal range of motion  Skin:     General: Skin is warm and dry  Neurological:      Mental Status: He is alert and oriented to person, place, and time  Deep Tendon Reflexes: Reflexes are normal and symmetric     Psychiatric:         Behavior: Behavior normal       Comments: Anxiety stable

## 2020-09-24 NOTE — PROGRESS NOTES
BMI Counseling: There is no height or weight on file to calculate BMI  The BMI is above normal  Nutrition recommendations include reducing portion sizes, decreasing overall calorie intake, 3-5 servings of fruits/vegetables daily, reducing fast food intake, consuming healthier snacks, decreasing soda and/or juice intake and reducing intake of cholesterol  Exercise recommendations include exercising 3-5 times per week

## 2020-10-26 ENCOUNTER — OFFICE VISIT (OUTPATIENT)
Dept: FAMILY MEDICINE CLINIC | Facility: CLINIC | Age: 75
End: 2020-10-26
Payer: MEDICARE

## 2020-10-26 VITALS
OXYGEN SATURATION: 97 % | SYSTOLIC BLOOD PRESSURE: 130 MMHG | BODY MASS INDEX: 25.86 KG/M2 | TEMPERATURE: 97.2 F | WEIGHT: 174.6 LBS | HEIGHT: 69 IN | DIASTOLIC BLOOD PRESSURE: 84 MMHG | HEART RATE: 59 BPM

## 2020-10-26 DIAGNOSIS — F41.9 ANXIETY: ICD-10-CM

## 2020-10-26 DIAGNOSIS — I10 HYPERTENSION, UNSPECIFIED TYPE: Primary | ICD-10-CM

## 2020-10-26 PROCEDURE — 99213 OFFICE O/P EST LOW 20 MIN: CPT | Performed by: FAMILY MEDICINE

## 2020-10-26 RX ORDER — METOPROLOL SUCCINATE 25 MG/1
TABLET, EXTENDED RELEASE ORAL
Qty: 90 TABLET | Refills: 3 | Status: SHIPPED | OUTPATIENT
Start: 2020-10-26 | End: 2021-10-11

## 2020-11-02 ENCOUNTER — HOSPITAL ENCOUNTER (EMERGENCY)
Facility: HOSPITAL | Age: 75
Discharge: HOME | End: 2020-11-02
Attending: EMERGENCY MEDICINE | Admitting: EMERGENCY MEDICINE
Payer: MEDICARE

## 2020-11-02 ENCOUNTER — APPOINTMENT (EMERGENCY)
Dept: RADIOLOGY | Facility: HOSPITAL | Age: 75
End: 2020-11-02
Payer: MEDICARE

## 2020-11-02 VITALS
WEIGHT: 176.37 LBS | OXYGEN SATURATION: 98 % | RESPIRATION RATE: 16 BRPM | TEMPERATURE: 99.1 F | DIASTOLIC BLOOD PRESSURE: 83 MMHG | BODY MASS INDEX: 26.05 KG/M2 | SYSTOLIC BLOOD PRESSURE: 178 MMHG | HEART RATE: 60 BPM

## 2020-11-02 DIAGNOSIS — M25.562 ACUTE PAIN OF LEFT KNEE: Primary | ICD-10-CM

## 2020-11-02 DIAGNOSIS — S83.92XA LEFT KNEE SPRAIN: ICD-10-CM

## 2020-11-02 PROCEDURE — 99284 EMERGENCY DEPT VISIT MOD MDM: CPT | Performed by: EMERGENCY MEDICINE

## 2020-11-02 PROCEDURE — 99283 EMERGENCY DEPT VISIT LOW MDM: CPT

## 2020-11-02 PROCEDURE — 73564 X-RAY EXAM KNEE 4 OR MORE: CPT

## 2020-11-05 ENCOUNTER — TRANSCRIBE ORDERS (OUTPATIENT)
Dept: ADMINISTRATIVE | Facility: HOSPITAL | Age: 75
End: 2020-11-05

## 2020-11-05 DIAGNOSIS — M25.562 PAIN IN LEFT KNEE: Primary | ICD-10-CM

## 2020-11-09 ENCOUNTER — APPOINTMENT (OUTPATIENT)
Dept: RADIOLOGY | Facility: MEDICAL CENTER | Age: 75
End: 2020-11-09
Payer: MEDICARE

## 2020-11-09 ENCOUNTER — TELEPHONE (OUTPATIENT)
Dept: FAMILY MEDICINE CLINIC | Facility: CLINIC | Age: 75
End: 2020-11-09

## 2020-11-09 ENCOUNTER — OFFICE VISIT (OUTPATIENT)
Dept: FAMILY MEDICINE CLINIC | Facility: CLINIC | Age: 75
End: 2020-11-09
Payer: MEDICARE

## 2020-11-09 ENCOUNTER — APPOINTMENT (OUTPATIENT)
Dept: LAB | Facility: CLINIC | Age: 75
End: 2020-11-09
Payer: MEDICARE

## 2020-11-09 VITALS
HEIGHT: 69 IN | DIASTOLIC BLOOD PRESSURE: 88 MMHG | HEART RATE: 70 BPM | TEMPERATURE: 97.8 F | RESPIRATION RATE: 16 BRPM | WEIGHT: 176.4 LBS | SYSTOLIC BLOOD PRESSURE: 138 MMHG | OXYGEN SATURATION: 98 % | BODY MASS INDEX: 26.13 KG/M2

## 2020-11-09 DIAGNOSIS — I10 BENIGN ESSENTIAL HYPERTENSION: ICD-10-CM

## 2020-11-09 DIAGNOSIS — I26.99 OTHER PULMONARY EMBOLISM WITHOUT ACUTE COR PULMONALE, UNSPECIFIED CHRONICITY (HCC): ICD-10-CM

## 2020-11-09 DIAGNOSIS — Z01.818 PRE-OP EXAMINATION: ICD-10-CM

## 2020-11-09 DIAGNOSIS — I71.2 ANEURYSM, ASCENDING AORTA (HCC): ICD-10-CM

## 2020-11-09 DIAGNOSIS — C73 THYROID CANCER (HCC): ICD-10-CM

## 2020-11-09 DIAGNOSIS — Z79.01 CHRONIC ANTICOAGULATION: ICD-10-CM

## 2020-11-09 DIAGNOSIS — I49.5 SICK SINUS SYNDROME (HCC): ICD-10-CM

## 2020-11-09 DIAGNOSIS — R91.8 SHADOW OF LUNG: Primary | ICD-10-CM

## 2020-11-09 DIAGNOSIS — R73.03 PRE-DIABETES: ICD-10-CM

## 2020-11-09 DIAGNOSIS — I48.0 PAROXYSMAL ATRIAL FIBRILLATION (HCC): ICD-10-CM

## 2020-11-09 DIAGNOSIS — Z01.818 PRE-OP EXAMINATION: Primary | ICD-10-CM

## 2020-11-09 DIAGNOSIS — E87.1 HYPONATREMIA: ICD-10-CM

## 2020-11-09 DIAGNOSIS — S76.112D TRAUMATIC RUPTURE OF LEFT QUADRICEPS TENDON, SUBSEQUENT ENCOUNTER: ICD-10-CM

## 2020-11-09 PROBLEM — S76.119A TRAUMATIC RUPTURE OF QUADRICEPS TENDON: Status: ACTIVE | Noted: 2020-11-09

## 2020-11-09 LAB
ALBUMIN SERPL BCP-MCNC: 3.9 G/DL (ref 3.5–5)
ALP SERPL-CCNC: 76 U/L (ref 46–116)
ALT SERPL W P-5'-P-CCNC: 20 U/L (ref 12–78)
ANION GAP SERPL CALCULATED.3IONS-SCNC: 4 MMOL/L (ref 4–13)
APTT PPP: 38 SECONDS (ref 23–37)
AST SERPL W P-5'-P-CCNC: 18 U/L (ref 5–45)
BASOPHILS # BLD AUTO: 0.06 THOUSANDS/ΜL (ref 0–0.1)
BASOPHILS NFR BLD AUTO: 1 % (ref 0–1)
BILIRUB SERPL-MCNC: 1.56 MG/DL (ref 0.2–1)
BUN SERPL-MCNC: 16 MG/DL (ref 5–25)
CALCIUM SERPL-MCNC: 9 MG/DL (ref 8.3–10.1)
CHLORIDE SERPL-SCNC: 96 MMOL/L (ref 100–108)
CO2 SERPL-SCNC: 29 MMOL/L (ref 21–32)
CREAT SERPL-MCNC: 0.78 MG/DL (ref 0.6–1.3)
EOSINOPHIL # BLD AUTO: 0.06 THOUSAND/ΜL (ref 0–0.61)
EOSINOPHIL NFR BLD AUTO: 1 % (ref 0–6)
ERYTHROCYTE [DISTWIDTH] IN BLOOD BY AUTOMATED COUNT: 15.2 % (ref 11.6–15.1)
GFR SERPL CREATININE-BSD FRML MDRD: 87 ML/MIN/1.73SQ M
GLUCOSE SERPL-MCNC: 111 MG/DL (ref 65–140)
HCT VFR BLD AUTO: 37.1 % (ref 36.5–49.3)
HGB BLD-MCNC: 12.7 G/DL (ref 12–17)
IMM GRANULOCYTES # BLD AUTO: 0.02 THOUSAND/UL (ref 0–0.2)
IMM GRANULOCYTES NFR BLD AUTO: 0 % (ref 0–2)
INR PPP: 1.28 (ref 0.84–1.19)
LYMPHOCYTES # BLD AUTO: 1.78 THOUSANDS/ΜL (ref 0.6–4.47)
LYMPHOCYTES NFR BLD AUTO: 28 % (ref 14–44)
MCH RBC QN AUTO: 29.7 PG (ref 26.8–34.3)
MCHC RBC AUTO-ENTMCNC: 34.2 G/DL (ref 31.4–37.4)
MCV RBC AUTO: 87 FL (ref 82–98)
MONOCYTES # BLD AUTO: 0.55 THOUSAND/ΜL (ref 0.17–1.22)
MONOCYTES NFR BLD AUTO: 9 % (ref 4–12)
NEUTROPHILS # BLD AUTO: 3.94 THOUSANDS/ΜL (ref 1.85–7.62)
NEUTS SEG NFR BLD AUTO: 61 % (ref 43–75)
NRBC BLD AUTO-RTO: 0 /100 WBCS
PLATELET # BLD AUTO: 271 THOUSANDS/UL (ref 149–390)
PMV BLD AUTO: 10.1 FL (ref 8.9–12.7)
POTASSIUM SERPL-SCNC: 4.6 MMOL/L (ref 3.5–5.3)
PROT SERPL-MCNC: 6.9 G/DL (ref 6.4–8.2)
PROTHROMBIN TIME: 16 SECONDS (ref 11.6–14.5)
RBC # BLD AUTO: 4.28 MILLION/UL (ref 3.88–5.62)
SODIUM SERPL-SCNC: 129 MMOL/L (ref 136–145)
WBC # BLD AUTO: 6.41 THOUSAND/UL (ref 4.31–10.16)

## 2020-11-09 PROCEDURE — 80053 COMPREHEN METABOLIC PANEL: CPT

## 2020-11-09 PROCEDURE — 85025 COMPLETE CBC W/AUTO DIFF WBC: CPT

## 2020-11-09 PROCEDURE — 93000 ELECTROCARDIOGRAM COMPLETE: CPT | Performed by: NURSE PRACTITIONER

## 2020-11-09 PROCEDURE — 83036 HEMOGLOBIN GLYCOSYLATED A1C: CPT

## 2020-11-09 PROCEDURE — 85730 THROMBOPLASTIN TIME PARTIAL: CPT

## 2020-11-09 PROCEDURE — 36415 COLL VENOUS BLD VENIPUNCTURE: CPT

## 2020-11-09 PROCEDURE — 85610 PROTHROMBIN TIME: CPT

## 2020-11-09 PROCEDURE — 99214 OFFICE O/P EST MOD 30 MIN: CPT | Performed by: NURSE PRACTITIONER

## 2020-11-09 PROCEDURE — 71046 X-RAY EXAM CHEST 2 VIEWS: CPT

## 2020-11-09 RX ORDER — ALPRAZOLAM 0.5 MG/1
TABLET ORAL
COMMUNITY
End: 2021-03-24 | Stop reason: SDUPTHER

## 2020-11-10 ENCOUNTER — APPOINTMENT (OUTPATIENT)
Dept: RADIOLOGY | Facility: MEDICAL CENTER | Age: 75
End: 2020-11-10
Payer: MEDICARE

## 2020-11-10 DIAGNOSIS — R91.8 SHADOW OF LUNG: ICD-10-CM

## 2020-11-10 LAB
EST. AVERAGE GLUCOSE BLD GHB EST-MCNC: 123 MG/DL
HBA1C MFR BLD: 5.9 %

## 2020-11-10 PROCEDURE — 71045 X-RAY EXAM CHEST 1 VIEW: CPT

## 2020-11-11 ENCOUNTER — TELEPHONE (OUTPATIENT)
Dept: FAMILY MEDICINE CLINIC | Facility: CLINIC | Age: 75
End: 2020-11-11

## 2020-11-11 ENCOUNTER — LAB (OUTPATIENT)
Dept: LAB | Facility: HOSPITAL | Age: 75
End: 2020-11-11
Payer: MEDICARE

## 2020-11-11 DIAGNOSIS — I10 BENIGN ESSENTIAL HYPERTENSION: ICD-10-CM

## 2020-11-11 DIAGNOSIS — E87.1 HYPONATREMIA: ICD-10-CM

## 2020-11-11 DIAGNOSIS — C73 THYROID CANCER (HCC): ICD-10-CM

## 2020-11-11 DIAGNOSIS — E87.1 HYPONATREMIA: Primary | ICD-10-CM

## 2020-11-11 LAB
ANION GAP SERPL CALCULATED.3IONS-SCNC: 9 MMOL/L (ref 4–13)
BUN SERPL-MCNC: 14 MG/DL (ref 5–25)
CALCIUM SERPL-MCNC: 8.6 MG/DL (ref 8.3–10.1)
CHLORIDE SERPL-SCNC: 92 MMOL/L (ref 100–108)
CO2 SERPL-SCNC: 25 MMOL/L (ref 21–32)
CREAT SERPL-MCNC: 0.65 MG/DL (ref 0.6–1.3)
GFR SERPL CREATININE-BSD FRML MDRD: 94 ML/MIN/1.73SQ M
GLUCOSE SERPL-MCNC: 96 MG/DL (ref 65–140)
POTASSIUM SERPL-SCNC: 4.6 MMOL/L (ref 3.5–5.3)
SODIUM SERPL-SCNC: 126 MMOL/L (ref 136–145)

## 2020-11-11 PROCEDURE — 84432 ASSAY OF THYROGLOBULIN: CPT

## 2020-11-11 PROCEDURE — 36415 COLL VENOUS BLD VENIPUNCTURE: CPT

## 2020-11-11 PROCEDURE — 80048 BASIC METABOLIC PNL TOTAL CA: CPT

## 2020-11-11 PROCEDURE — 86800 THYROGLOBULIN ANTIBODY: CPT

## 2020-11-12 ENCOUNTER — TELEPHONE (OUTPATIENT)
Dept: FAMILY MEDICINE CLINIC | Facility: CLINIC | Age: 75
End: 2020-11-12

## 2020-11-12 ENCOUNTER — TELEPHONE (OUTPATIENT)
Dept: CARDIOLOGY CLINIC | Facility: CLINIC | Age: 75
End: 2020-11-12

## 2020-11-12 RX ORDER — LISINOPRIL 20 MG/1
20 TABLET ORAL DAILY
Qty: 30 TABLET | Refills: 0 | Status: SHIPPED | OUTPATIENT
Start: 2020-11-12 | End: 2020-11-30 | Stop reason: SDUPTHER

## 2020-11-15 ENCOUNTER — LAB (OUTPATIENT)
Dept: LAB | Facility: HOSPITAL | Age: 75
End: 2020-11-15
Payer: MEDICARE

## 2020-11-15 DIAGNOSIS — E87.1 HYPONATREMIA: ICD-10-CM

## 2020-11-15 LAB
ANION GAP SERPL CALCULATED.3IONS-SCNC: 7 MMOL/L (ref 4–13)
BUN SERPL-MCNC: 19 MG/DL (ref 5–25)
CALCIUM SERPL-MCNC: 8.7 MG/DL (ref 8.3–10.1)
CHLORIDE SERPL-SCNC: 100 MMOL/L (ref 100–108)
CO2 SERPL-SCNC: 27 MMOL/L (ref 21–32)
CREAT SERPL-MCNC: 0.68 MG/DL (ref 0.6–1.3)
GFR SERPL CREATININE-BSD FRML MDRD: 92 ML/MIN/1.73SQ M
GLUCOSE SERPL-MCNC: 103 MG/DL (ref 65–140)
POTASSIUM SERPL-SCNC: 4.6 MMOL/L (ref 3.5–5.3)
SODIUM SERPL-SCNC: 134 MMOL/L (ref 136–145)

## 2020-11-15 PROCEDURE — 80048 BASIC METABOLIC PNL TOTAL CA: CPT

## 2020-11-15 PROCEDURE — 36415 COLL VENOUS BLD VENIPUNCTURE: CPT

## 2020-11-16 ENCOUNTER — TELEPHONE (OUTPATIENT)
Dept: FAMILY MEDICINE CLINIC | Facility: CLINIC | Age: 75
End: 2020-11-16

## 2020-11-16 LAB
THYROGLOB AB SERPL-ACNC: 13.9 IU/ML (ref 0–0.9)
THYROGLOB SERPL-MCNC: 8.2 NG/ML

## 2020-11-30 ENCOUNTER — TELEPHONE (OUTPATIENT)
Dept: FAMILY MEDICINE CLINIC | Facility: CLINIC | Age: 75
End: 2020-11-30

## 2020-11-30 DIAGNOSIS — I10 BENIGN ESSENTIAL HYPERTENSION: ICD-10-CM

## 2020-11-30 RX ORDER — LISINOPRIL 20 MG/1
20 TABLET ORAL DAILY
Qty: 90 TABLET | Refills: 5 | Status: SHIPPED | OUTPATIENT
Start: 2020-11-30 | End: 2022-02-07

## 2020-11-30 RX ORDER — LISINOPRIL 20 MG/1
20 TABLET ORAL DAILY
Qty: 30 TABLET | Refills: 5 | Status: SHIPPED | OUTPATIENT
Start: 2020-11-30 | End: 2020-11-30 | Stop reason: SDUPTHER

## 2020-12-07 ENCOUNTER — EVALUATION (OUTPATIENT)
Dept: PHYSICAL THERAPY | Facility: CLINIC | Age: 75
End: 2020-12-07
Payer: MEDICARE

## 2020-12-07 DIAGNOSIS — S76.112D RUPTURE, TENDON, QUADRICEPS, LEFT, SUBSEQUENT ENCOUNTER: Primary | ICD-10-CM

## 2020-12-07 PROCEDURE — 97162 PT EVAL MOD COMPLEX 30 MIN: CPT

## 2020-12-10 ENCOUNTER — OFFICE VISIT (OUTPATIENT)
Dept: PHYSICAL THERAPY | Facility: CLINIC | Age: 75
End: 2020-12-10
Payer: MEDICARE

## 2020-12-10 DIAGNOSIS — S76.112D RUPTURE, TENDON, QUADRICEPS, LEFT, SUBSEQUENT ENCOUNTER: Primary | ICD-10-CM

## 2020-12-10 PROCEDURE — 97014 ELECTRIC STIMULATION THERAPY: CPT

## 2020-12-10 PROCEDURE — 97116 GAIT TRAINING THERAPY: CPT

## 2020-12-10 PROCEDURE — 97112 NEUROMUSCULAR REEDUCATION: CPT

## 2020-12-13 DIAGNOSIS — I26.99 OTHER PULMONARY EMBOLISM WITHOUT ACUTE COR PULMONALE, UNSPECIFIED CHRONICITY (HCC): ICD-10-CM

## 2020-12-14 ENCOUNTER — OFFICE VISIT (OUTPATIENT)
Dept: PHYSICAL THERAPY | Facility: CLINIC | Age: 75
End: 2020-12-14
Payer: MEDICARE

## 2020-12-14 DIAGNOSIS — S76.112D RUPTURE, TENDON, QUADRICEPS, LEFT, SUBSEQUENT ENCOUNTER: Primary | ICD-10-CM

## 2020-12-14 PROCEDURE — 97140 MANUAL THERAPY 1/> REGIONS: CPT

## 2020-12-14 PROCEDURE — 97014 ELECTRIC STIMULATION THERAPY: CPT

## 2020-12-14 PROCEDURE — 97112 NEUROMUSCULAR REEDUCATION: CPT

## 2020-12-14 PROCEDURE — 97110 THERAPEUTIC EXERCISES: CPT

## 2020-12-14 RX ORDER — RIVAROXABAN 20 MG/1
TABLET, FILM COATED ORAL
Qty: 90 TABLET | Refills: 3 | Status: SHIPPED | OUTPATIENT
Start: 2020-12-14 | End: 2021-11-16

## 2020-12-16 ENCOUNTER — OFFICE VISIT (OUTPATIENT)
Dept: PHYSICAL THERAPY | Facility: CLINIC | Age: 75
End: 2020-12-16
Payer: MEDICARE

## 2020-12-16 DIAGNOSIS — S76.112D RUPTURE, TENDON, QUADRICEPS, LEFT, SUBSEQUENT ENCOUNTER: Primary | ICD-10-CM

## 2020-12-16 PROCEDURE — 97140 MANUAL THERAPY 1/> REGIONS: CPT

## 2020-12-16 PROCEDURE — 97112 NEUROMUSCULAR REEDUCATION: CPT

## 2020-12-16 PROCEDURE — 97110 THERAPEUTIC EXERCISES: CPT

## 2020-12-17 ENCOUNTER — APPOINTMENT (OUTPATIENT)
Dept: PHYSICAL THERAPY | Facility: CLINIC | Age: 75
End: 2020-12-17
Payer: MEDICARE

## 2020-12-21 ENCOUNTER — OFFICE VISIT (OUTPATIENT)
Dept: PHYSICAL THERAPY | Facility: CLINIC | Age: 75
End: 2020-12-21
Payer: MEDICARE

## 2020-12-21 DIAGNOSIS — S76.112D RUPTURE, TENDON, QUADRICEPS, LEFT, SUBSEQUENT ENCOUNTER: Primary | ICD-10-CM

## 2020-12-21 PROCEDURE — 97140 MANUAL THERAPY 1/> REGIONS: CPT

## 2020-12-21 PROCEDURE — 97014 ELECTRIC STIMULATION THERAPY: CPT

## 2020-12-21 PROCEDURE — 97112 NEUROMUSCULAR REEDUCATION: CPT

## 2020-12-21 PROCEDURE — 97110 THERAPEUTIC EXERCISES: CPT

## 2020-12-24 ENCOUNTER — OFFICE VISIT (OUTPATIENT)
Dept: PHYSICAL THERAPY | Facility: CLINIC | Age: 75
End: 2020-12-24
Payer: MEDICARE

## 2020-12-24 DIAGNOSIS — S76.112D RUPTURE, TENDON, QUADRICEPS, LEFT, SUBSEQUENT ENCOUNTER: Primary | ICD-10-CM

## 2020-12-24 PROCEDURE — 97140 MANUAL THERAPY 1/> REGIONS: CPT

## 2020-12-24 PROCEDURE — 97112 NEUROMUSCULAR REEDUCATION: CPT

## 2020-12-24 PROCEDURE — 97014 ELECTRIC STIMULATION THERAPY: CPT

## 2020-12-28 ENCOUNTER — OFFICE VISIT (OUTPATIENT)
Dept: PHYSICAL THERAPY | Facility: CLINIC | Age: 75
End: 2020-12-28
Payer: MEDICARE

## 2020-12-28 DIAGNOSIS — S76.112D RUPTURE, TENDON, QUADRICEPS, LEFT, SUBSEQUENT ENCOUNTER: Primary | ICD-10-CM

## 2020-12-28 PROCEDURE — 97110 THERAPEUTIC EXERCISES: CPT

## 2020-12-28 PROCEDURE — 97032 APPL MODALITY 1+ESTIM EA 15: CPT

## 2020-12-31 ENCOUNTER — OFFICE VISIT (OUTPATIENT)
Dept: PHYSICAL THERAPY | Facility: CLINIC | Age: 75
End: 2020-12-31
Payer: MEDICARE

## 2020-12-31 DIAGNOSIS — S76.112D RUPTURE, TENDON, QUADRICEPS, LEFT, SUBSEQUENT ENCOUNTER: Primary | ICD-10-CM

## 2020-12-31 PROCEDURE — 97014 ELECTRIC STIMULATION THERAPY: CPT

## 2020-12-31 PROCEDURE — 97112 NEUROMUSCULAR REEDUCATION: CPT

## 2020-12-31 PROCEDURE — 97110 THERAPEUTIC EXERCISES: CPT

## 2021-01-05 ENCOUNTER — OFFICE VISIT (OUTPATIENT)
Dept: PHYSICAL THERAPY | Facility: CLINIC | Age: 76
End: 2021-01-05
Payer: MEDICARE

## 2021-01-05 DIAGNOSIS — S76.112D RUPTURE, TENDON, QUADRICEPS, LEFT, SUBSEQUENT ENCOUNTER: Primary | ICD-10-CM

## 2021-01-05 PROCEDURE — 97112 NEUROMUSCULAR REEDUCATION: CPT

## 2021-01-05 PROCEDURE — 97110 THERAPEUTIC EXERCISES: CPT

## 2021-01-05 NOTE — PROGRESS NOTES
Daily Note     Today's date: 2021  Patient name: Westley Diaz  : 1945  MRN: [id number]  Referring provider: Chester Zimmerman PA-C  Dx:   Encounter Diagnosis     ICD-10-CM    1  Rupture, tendon, quadriceps, left, subsequent encounter  S76 112D        Start Time: 1030  Stop Time: 1115  Total time in clinic (min): 45 minutes  Subjective: Pt offers no new complaints upon arrival to today's Tx  Objective: See treatment diary below     Assessment: Tolerated treatment well  Brace adjusted to 60* flex per protocol  Pt is now in week 7 of protocol and can have brace further adjusted to 90* flex NV  Pt demonstrated fatigue post treatment, exhibited good technique with therapeutic exercises and would benefit from continued PT to progress quad strength and patellar mobility to normalize gait mechanics and improve pt's tolerance to ambulating prolonged periods of time  Plan: Cont /c PT POC  Progress as tolerated  Precautions: S/P (L) quad tendon repair 2020 - see protocol  A-fib; hx of DVT (L) leg; aortic aneurysm; hx thyroid cancer; hypothyroidism; impaired glucose tolerance; osteopenia; hyperlipidemia; anxiety; HTN; hx (R) quad tendon repair 2016    Date 12/7 12/10 12/14 12/16 12/21 12/24  12/31 01/05    FOTO              Re-eval                 Manuals 12/7 12/10 12/14 12/16 12/21 12/24 12/28 12/31 01/05    (L) knee flex to 30; ext PROM nv  AL - ext PK 60* no end feel 60*       (L) patellar mobs nv  AL PK ROM SP AL        Brace adjustments         60* flex SP                 Neuro Re-Ed 12/7 12/10 12/14 12/16 12/21 12/24 12/28 12/31 01/05    Semi tandem stance on foam*   2x20" ea 2x30" ea 30"x2ea D/c       Tandem stance on foam      2x30" ea       Standing weight shifts  nv 4x1' D/c - - - - - - -   Biodex weight shifts   2' ML, 2' AP 2' ML, 2' AP 2' ML 2' AP L12 2' ML, 2' AP L11       Biodex LOS   2x 2x 2x L12 L11 2x       Kailey negotiation*             Quad sets concurrent w/ NMES nv 10' L4 intensity 10' L5  10' L5 10' L9 /c NMES 10' L9 w/ NMES 10' w/ NMES 10' /c NMES 10' /c NMES                 Ther Ex 12/7 12/10 12/14 12/16 12/21 12/24 12/28 12/31 01/05    Heel prop nv  2' HEP 2'         Supine HS (S) w/ strap nv  4x15" HEP 4x15"         Supine gastroc (S) w/ strap nv 10"x10 4x15" HEP 4x15"   3 x 30" 20"x5 slant 20"x5 slant    Heelslides to 30 deg flex nv 3"x20 20x3" 20x3" 5"x20 60* 20x5" 60 deg 10 x 10" 80deg 10"x10 10"x10    SLR*             Bridges*             Prone quad (S)*             3 way hip, standing   nv 15x ea L only 2x10ea (B/L) green  2 x 10 ea green  2x15ea green (B/L) 2x15ea green (B/L)    Standing knee flex AROM        2x15ea green (B/L) 2x15ea green (B/L)    Ther Activity 12/7 12/10 12/14 12/16 12/21 12/24  12/31 01/05    Sidesteps*      nv       Mini squats*             Sit to stands*             Pt Edu  SP AL                       Gait Training 12/7 12/10 12/14 12/16 12/21 12/24  12/31 01/05    WBAT  nv 10'  4' pre tx -                      Modalities 12/7 12/10 12/14 12/16 12/21 12/24  12/31 01/05    Ice - nv            NMES (L) quad nv 10'  10'  10' 10'  L9 10' L9 10' 400us 75 hz 10on 30off  10'  /c quad sets  10'  /c quad sets         Susananorah Ya, PTA

## 2021-01-07 ENCOUNTER — EVALUATION (OUTPATIENT)
Dept: PHYSICAL THERAPY | Facility: CLINIC | Age: 76
End: 2021-01-07
Payer: MEDICARE

## 2021-01-07 DIAGNOSIS — S76.112D RUPTURE, TENDON, QUADRICEPS, LEFT, SUBSEQUENT ENCOUNTER: Primary | ICD-10-CM

## 2021-01-07 PROCEDURE — 97110 THERAPEUTIC EXERCISES: CPT

## 2021-01-07 PROCEDURE — 97140 MANUAL THERAPY 1/> REGIONS: CPT

## 2021-01-07 NOTE — LETTER
2021    Reece Gore PA-C  [address] [employer]  [address] [place] [address]    Patient: Fernandez Gutierrez  YOB: 1945   Date of Visit: 2021     Encounter Diagnosis     ICD-10-CM    1  Rupture, tendon, quadriceps, left, subsequent encounter  S50 739P        Dear Dr Oswaldo Vasquez: Thank you for your recent referral of Fernandez Gutierrez    Please review the attached evaluation summary from Edward's recent visit  Please verify that you agree with the plan of care by signing the attached order  If you have any questions or concerns, please do not hesitate to call  I sincerely appreciate the opportunity to share in the care of one of your patients and hope to have another opportunity to work with you in the near future  Sincerely,    Jaci Dodson, PT      Referring Provider:      I certify that I have read the below Plan of Care and certify the need for these services furnished under this plan of treatment while under my care  Reece Gore PA-C  [address]  Via Fax: [phone]          PT Re-Evaluation     Today's date: 2021  Patient name: Fernandez Gutierrez  : 1945  MRN: [id number]  Referring provider: Conrado Ortega PA-C  Dx:   Encounter Diagnosis     ICD-10-CM    1  Rupture, tendon, quadriceps, left, subsequent encounter  S76 112D                   Assessment  Assessment details: To date, Mr Amanda Haywood  Has participated in a total of 10 skilled therapy sessions for tx of (L) knee s/p recent quad tendon repair on 2020  Upon reassessment today, pt is steady progress toward his goals  Objectively, he now ambulates w/out gait deviations while using the brace and shows increasing LE strength; improved patellar mobility; improved SL stability; increasing knee flex AROM; and decreased swelling thru the knee   Subjectively, he reports an improved ability walking, transferring, and negotiating stairs  He also continues to report no pain thru the knee daily  He remains limited w/ walking prolonged periods of time; negotiating stairs reciprocally; bending the knee; and squatting  He continues to present w/ dec LE strength; localized edema; dec knee flex AROM; and decreased lower quarter stability  He would therefore benefit from 4 more weeks of PT intervention in order to address the aforementioned deficits so that he can return to his PLOF function safely in his home and surrounding environment  Impairments: abnormal gait, abnormal or restricted ROM, abnormal movement, impaired balance, impaired physical strength, pain with function, weight-bearing intolerance and poor body mechanics    Symptom irritability: lowUnderstanding of Dx/Px/POC: good   Prognosis: good    Goals  STG 1: Pt will demonstrate compliance w/ HEP to supplement therapy in 1-2 weeks  MET  STG 2: Pt will be able ambulate thru his home w/ min deviations and the LRAD in 4-6 weeks  MET - with brace  STG 3: Pt will able to sleep comfortably at night w/ min difficulty related to the (L) knee in 4 weeks  PROGRESSING - pt continues to report some difficulty doing so d/t presence of brace  LTG 1: Pt will be able to ambulate 1 mile w/ min difficulty in 8 weeks  PROGRESSING - Pt able to walk "as far as he wants to"  LTG 2: Pt will be able to negotiate 1 flight of stairs in his home reciprocally using unilateral HHA in 8-10 weeks  PROGRESSING - pt able to negotiate using step to pattern w/out difficulty  LTG 3: Pt will be able to transfer from all surfaces in his home w/ min difficulty in 8-10 weeks   PROGRESSING - pt reports very minimal difficulty     Plan  Patient would benefit from: skilled physical therapy  Planned modality interventions: cryotherapy, TENS, unattended electrical stimulation, electrical stimulation/Libyan stimulation and microcurrent electrical stimulation  Planned therapy interventions: abdominal trunk stabilization, postural training, patient education, neuromuscular re-education, joint mobilization, manual therapy, massage, activity modification, balance, body mechanics training, De Oliveira taping, strengthening, stretching, therapeutic activities, coordination, flexibility, home exercise program, therapeutic exercise, functional ROM exercises, gait training, balance/weight bearing training, self care and graded activity  Frequency: 2x week  Duration in weeks: 4  Treatment plan discussed with: patient        Subjective Evaluation    History of Present Illness  Date of surgery: 2020  Mechanism of injury: Pt states that he feels good walking now that the brace is unlocked to 60 degrees  Pt states that the knee is starting to bend a little more during the stretches w/ the rope and while bending the knee when in prone  Pt states that he still is not able to fully bend the knee  Pt states that he does not have any problem negotiating stairs however does continue to do so using a step to pattern  Pain  Current pain ratin  At best pain ratin  At worst pain ratin  Location: Pt reports occasional pain thru the (L) anterolateral knee  Patient Goals  Patient goal: Pt wishes to get back to "normal "        Objective     Observations     Additional Observation Details  Scar healing well  No periwound redness/discoloration present  Palpation     Additional Palpation Details  No TTP present thru (L) quad tendon/musculature  Active Range of Motion   Left Knee   Flexion: 79 degrees   Extension: WFL    Right Knee   Flexion: 135 degrees   Extension: [facility]    Mobility   Patellar Mobility:   Left Knee   WFL: medial, lateral, superior and inferior       Strength/Myotome Testing     Left Knee   Flexion: 4  Extension: 3 (Pt able to actively extend knee; did not provide ample resistance to accurately assess strength d/t precautions)  Quadriceps contraction: good    Right Knee   Flexion: 5  Extension: 5    Swelling     Left Knee Girth Measurement (cm)   Joint line: 41 cm    Right Knee Girth Measurement (cm)   Joint line: 38 cm    General Comments:      Knee Comments  (R) SLS: 5 secs, unsteady, inc trunk/UE movement, mod-severe sway  (L) SLS: 10 secs, unsteady, min-mod sway, no pain  Gait: Pt ambulates w/out AD, brace unlocked to 60 deg, without observable deviations  Squat: Inc WB thru (R) LE, depth limited by brace, no pain  Precautions: S/P (L) quad tendon repair 11/16/2020 - see protocol  A-fib; hx of DVT (L) leg; aortic aneurysm; hx thyroid cancer; hypothyroidism; impaired glucose tolerance; osteopenia; hyperlipidemia; anxiety; HTN; hx (R) quad tendon repair 2016      Date 12/7 12/10 12/14 12/16 12/21 12/24  12/31 01/05 1/07   FOTO           XX   Re-eval          XX       Manuals 12/7 12/10 12/14 12/16 12/21 12/24 12/28 12/31 01/05 01/07   (L) knee flex to 30; ext PROM nv  AL - ext PK 60* no end feel 60*    nv   (L) patellar mobs nv  AL PK ROM SP AL        Brace adjustments         60* flex SP Open to 90 nv   Re-eval          AL                Neuro Re-Ed 12/7 12/10 12/14 12/16 12/21 12/24 12/28 12/31 01/05 1/07   Tandem stance on foam      2x30" ea       SLS          nv   Biodex weight shifts   2' ML, 2' AP 2' ML, 2' AP 2' ML 2' AP L12 2' ML, 2' AP L11       Biodex LOS   2x 2x 2x L12 L11 2x       Kailey negotiation*          nv   Quad sets concurrent w/ NMES nv 10' L4 intensity 10' L5  10' L5 10' L9 /c NMES 10' L9 w/ NMES 10' w/ NMES 10' /c NMES 10' /c NMES D/c                Ther Ex 12/7 12/10 12/14 12/16 12/21 12/24 12/28 12/31 01/05 1/07   Supine gastroc (S) w/ strap nv 10"x10 4x15" HEP 4x15"   3 x 30" 20"x5 slant 20"x5 slant nv   Heelslides to 30 deg flex nv 3"x20 20x3" 20x3" 5"x20 60* 20x5" 60 deg 10 x 10" 80deg 10"x10 10"x10 nv   LAQs*          hold   4 way SLR w/ brace          2x10    Bridges          nv   Clams          nv   Prone quad (S)          nv   3 way hip, standing   nv 15x ea L only 2x10ea (B/L) green  2 x 10 ea green  2x15ea green (B/L) 2x15ea green (B/L) nv   Standing knee flex AROM        2x15ea green (B/L) 2x15ea green (B/L) nv   Wall sits**                                       Ther Activity 12/7 12/10 12/14 12/16 12/21 12/24  12/31 01/05 1/07   Recumbent bike rocking           nv   Sidesteps      nv    4 laps    Mini squats          2x10    Step ups          Lv1 2x10   Step downs*             Sit to stands*             Pt Edu  SP AL       AL POC                Gait Training 12/7 12/10 12/14 12/16 12/21 12/24  12/31 01/05 1/07   WBAT  nv 10'  4' pre tx -                      Modalities 12/7 12/10 12/14 12/16 12/21 12/24  12/31 01/05 1/07   Ice - nv            NMES (L) quad nv 10'  10'  10' 10'  L9 10' L9 10' 400us 76 hz 10on 30off  10'  /c quad sets  10'  /c quad sets  D/c

## 2021-01-07 NOTE — PROGRESS NOTES
PT Re-Evaluation     Today's date: 2021  Patient name: Bradley Dumont  : 1945  MRN: [id number]  Referring provider: Barb Boyle PA-C  Dx:   Encounter Diagnosis     ICD-10-CM    1  Rupture, tendon, quadriceps, left, subsequent encounter  S76 112D                   Assessment  Assessment details: To date, Mr Camila Quiroz  Has participated in a total of 10 skilled therapy sessions for tx of (L) knee s/p recent quad tendon repair on 2020  Upon reassessment today, pt is steady progress toward his goals  Objectively, he now ambulates w/out gait deviations while using the brace and shows increasing LE strength; improved patellar mobility; improved SL stability; increasing knee flex AROM; and decreased swelling thru the knee  Subjectively, he reports an improved ability walking, transferring, and negotiating stairs  He also continues to report no pain thru the knee daily  He remains limited w/ walking prolonged periods of time; negotiating stairs reciprocally; bending the knee; and squatting  He continues to present w/ dec LE strength; localized edema; dec knee flex AROM; and decreased lower quarter stability  He would therefore benefit from 4 more weeks of PT intervention in order to address the aforementioned deficits so that he can return to his PLOF function safely in his home and surrounding environment  Impairments: abnormal gait, abnormal or restricted ROM, abnormal movement, impaired balance, impaired physical strength, pain with function, weight-bearing intolerance and poor body mechanics    Symptom irritability: lowUnderstanding of Dx/Px/POC: good   Prognosis: good    Goals  STG 1: Pt will demonstrate compliance w/ HEP to supplement therapy in 1-2 weeks  MET  STG 2: Pt will be able ambulate thru his home w/ min deviations and the LRAD in 4-6 weeks  MET - with brace  STG 3: Pt will able to sleep comfortably at night w/ min difficulty related to the (L) knee in 4 weeks   PROGRESSING - pt continues to report some difficulty doing so d/t presence of brace  LTG 1: Pt will be able to ambulate 1 mile w/ min difficulty in 8 weeks  PROGRESSING - Pt able to walk "as far as he wants to"  LTG 2: Pt will be able to negotiate 1 flight of stairs in his home reciprocally using unilateral HHA in 8-10 weeks  PROGRESSING - pt able to negotiate using step to pattern w/out difficulty  LTG 3: Pt will be able to transfer from all surfaces in his home w/ min difficulty in 8-10 weeks  PROGRESSING - pt reports very minimal difficulty     Plan  Patient would benefit from: skilled physical therapy  Planned modality interventions: cryotherapy, TENS, unattended electrical stimulation, electrical stimulation/[place] stimulation and microcurrent electrical stimulation  Planned therapy interventions: abdominal trunk stabilization, postural training, patient education, neuromuscular re-education, joint mobilization, manual therapy, massage, activity modification, balance, body mechanics training, De Oliveira taping, strengthening, stretching, therapeutic activities, coordination, flexibility, home exercise program, therapeutic exercise, functional ROM exercises, gait training, balance/weight bearing training, self care and graded activity  Frequency: 2x week  Duration in weeks: 4  Treatment plan discussed with: patient        Subjective Evaluation    History of Present Illness  Date of surgery: 2020  Mechanism of injury: Pt states that he feels good walking now that the brace is unlocked to 60 degrees  Pt states that the knee is starting to bend a little more during the stretches w/ the rope and while bending the knee when in prone  Pt states that he still is not able to fully bend the knee  Pt states that he does not have any problem negotiating stairs however does continue to do so using a step to pattern    Pain  Current pain ratin  At best pain ratin  At worst pain ratin  Location: Pt reports occasional pain thru the (L) anterolateral knee  Patient Goals  Patient goal: Pt wishes to get back to "normal "        Objective     Observations     Additional Observation Details  Scar healing well  No periwound redness/discoloration present  Palpation     Additional Palpation Details  No TTP present thru (L) quad tendon/musculature  Active Range of Motion   Left Knee   Flexion: 79 degrees   Extension: WFL    Right Knee   Flexion: 135 degrees   [facility]    Mobility   Patellar Mobility:   Left Knee   WFL: medial, lateral, superior and inferior  Strength/Myotome Testing     Left Knee   Flexion: 4  Extension: 3 (Pt able to actively extend knee; did not provide ample resistance to accurately assess strength d/t precautions)  Quadriceps contraction: good    Right Knee   Flexion: 5  Extension: 5    Swelling     Left Knee Girth Measurement (cm)   Joint line: 41 cm    Right Knee Girth Measurement (cm)   Joint line: 38 cm    General Comments:      Knee Comments  (R) SLS: 5 secs, unsteady, inc trunk/UE movement, mod-severe sway  (L) SLS: 10 secs, unsteady, min-mod sway, no pain  Gait: Pt ambulates w/out AD, brace unlocked to 60 deg, without observable deviations  Squat: Inc WB thru (R) LE, depth limited by brace, no pain  Precautions: S/P (L) quad tendon repair 11/16/2020 - see protocol  A-fib; hx of DVT (L) leg; aortic aneurysm; hx thyroid cancer; hypothyroidism; impaired glucose tolerance; osteopenia; hyperlipidemia; anxiety; HTN; hx (R) quad tendon repair 2016      Date 12/7 12/10 12/14 12/16 12/21 12/24  12/31 01/05 1/07   FOTO           XX   Re-eval          XX       Manuals 12/7 12/10 12/14 12/16 12/21 12/24 12/28 12/31 01/05 01/07   (L) knee flex to 30; ext PROM nv  AL - ext PK 60* no end feel 60*    nv   (L) patellar mobs nv  AL PK ROM SP AL        Brace adjustments         60* flex SP Open to 90 nv   Re-eval          AL                Neuro Re-Ed 12/7 12/10 12/14 12/16 12/21 12/24 12/28 12/31 01/05 1/07   Tandem stance on foam      2x30" ea       SLS          nv   Biodex weight shifts   2' ML, 2' AP 2' ML, 2' AP 2' ML 2' AP L12 2' ML, 2' AP L11       Biodex LOS   2x 2x 2x L12 L11 2x       Kailey negotiation*          nv   Quad sets concurrent w/ NMES nv 10' L4 intensity 10' L5  10' L5 10' L9 /c NMES 10' L9 w/ NMES 10' w/ NMES 10' /c NMES 10' /c NMES D/c                Ther Ex 12/7 12/10 12/14 12/16 12/21 12/24 12/28 12/31 01/05 1/07   Supine gastroc (S) w/ strap nv 10"x10 4x15" HEP 4x15"   3 x 30" 20"x5 slant 20"x5 slant nv   Heelslides to 30 deg flex nv 3"x20 20x3" 20x3" 5"x20 60* 20x5" 60 deg 10 x 10" 80deg 10"x10 10"x10 nv   LAQs*          hold   4 way SLR w/ brace          2x10    Bridges          nv   Clams          nv   Prone quad (S)          nv   3 way hip, standing   nv 15x ea L only 2x10ea (B/L) green  2 x 10 ea green  2x15ea green (B/L) 2x15ea green (B/L) nv   Standing knee flex AROM        2x15ea green (B/L) 2x15ea green (B/L) nv   Wall sits**                                       Ther Activity 12/7 12/10 12/14 12/16 12/21 12/24  12/31 01/05 1/07   Recumbent bike rocking           nv   Sidesteps      nv    4 laps    Mini squats          2x10    Step ups          Lv1 2x10   Step downs*             Sit to stands*             Pt Edu  SP AL       AL POC                Gait Training 12/7 12/10 12/14 12/16 12/21 12/24  12/31 01/05 1/07   WBAT  nv 10'  4' pre tx -                      Modalities 12/7 12/10 12/14 12/16 12/21 12/24  12/31 01/05 1/07   Ice - nv            NMES (L) quad nv 10'  10'  10' 10'  L9 10' L9 10' 400us 76 hz 10on 30off  10'  /c quad sets  10'  /c quad sets  D/c

## 2021-01-08 ENCOUNTER — TRANSCRIBE ORDERS (OUTPATIENT)
Dept: PHYSICAL THERAPY | Facility: CLINIC | Age: 76
End: 2021-01-08

## 2021-01-08 DIAGNOSIS — S76.112D RUPTURE, TENDON, QUADRICEPS, LEFT, SUBSEQUENT ENCOUNTER: Primary | ICD-10-CM

## 2021-01-12 ENCOUNTER — OFFICE VISIT (OUTPATIENT)
Dept: PHYSICAL THERAPY | Facility: CLINIC | Age: 76
End: 2021-01-12
Payer: MEDICARE

## 2021-01-12 DIAGNOSIS — S76.112D RUPTURE, TENDON, QUADRICEPS, LEFT, SUBSEQUENT ENCOUNTER: Primary | ICD-10-CM

## 2021-01-12 PROCEDURE — 97530 THERAPEUTIC ACTIVITIES: CPT

## 2021-01-12 PROCEDURE — 97110 THERAPEUTIC EXERCISES: CPT | Performed by: PHYSICAL THERAPIST

## 2021-01-12 NOTE — PROGRESS NOTES
Daily Note     Today's date: 2021  Patient name: Holli White  : 1945  MRN: [id number]  Referring provider: Lilliam Ramsay PA-C  Dx:   Encounter Diagnosis     ICD-10-CM    1  Rupture, tendon, quadriceps, left, subsequent encounter  S76 271D                   Subjective: Pt offers no new c/o related to the knee this AM  Pt denies soreness following LV  Objective: See treatment diary below      Assessment: Tolerated treatment well  Brace unlocked to 90 deg as indicated per protocol  Pt able to complete exercises as listed w/out c/o including several additions/progressions to improve closed kinetic chain LE strength and quad flexibility  Patient exhibited good technique with therapeutic exercises and would benefit from continued PT to progress LE strength to improve pt's tolerance to negotiating stairs/ walking on inclines daily w/ ease  Plan: Continue per plan of care  Progress treament per protocol  Precautions: S/P (L) quad tendon repair 2020 - see protocol  A-fib; hx of DVT (L) leg; aortic aneurysm; hx thyroid cancer; hypothyroidism; impaired glucose tolerance; osteopenia; hyperlipidemia; anxiety; HTN; hx (R) quad tendon repair 2016      Date    FOTO           XX   Re-eval          XX       Manuals    (L) knee flex to 30; ext PROM    PK 60* no end feel 60*    nv   (L) patellar mobs    PK ROM SP AL        Brace adjustments 90 deg AL        60* flex SP Open to 90 nv   Re-eval          AL                Neuro Re-Ed    Tandem stance on foam      2x30" ea       SLS 2x20" ea         nv   Biodex weight shifts    2' ML, 2' AP 2' ML 2' AP L12 2' ML, 2' AP L11       Biodex LOS    2x 2x L12 L11 2x       Kailey negotiation          nv   Quad sets concurrent w/ NMES    10' L5 10' L9 /c NMES 10' L9 w/ NMES 10' w/ NMES 10' /c NMES 10' /c NMES D/c Ther Ex 1/12 12/16 12/21 12/24 12/28 12/31 01/05 1/07   Supine gastroc (S) w/ strap 5x20" slant   4x15"   3 x 30" 20"x5 slant 20"x5 slant nv   Heelslides to 30 deg flex 10x5"   20x3" 5"x20 60* 20x5" 60 deg 10 x 10" 80deg 10"x10 10"x10 nv   LAQs*          hold   4 way SLR w/ brace 20x ea         2x10    Bridges 20x ea         nv   Clams          nv   Prone quad (S) 4x15"         nv   3 way hip, standing 2x15 ea green   15x ea L only 2x10ea (B/L) green  2 x 10 ea green  2x15ea green (B/L) 2x15ea green (B/L) nv   Standing knee flex AROM 2x15 ea green SANDRO       2x15ea green (B/L) 2x15ea green (B/L) nv   Wall sits**                                       Ther Activity 1/12 12/16 12/21 12/24 12/31 01/05 1/07   Recumbent bike rocking  6' with brace         nv   Sidesteps 4 laps     nv    4 laps    Mini squats 30x         2x10    Step ups L2 20x ea         Lv1 2x10   Lateral step ups L2 20x ea            Step downs*             Sit to stands*             Pt Edu AL         AL POC                Gait Training 1/12 12/16 12/21 12/24 12/31 01/05 1/07   WBAT     -                      Modalities 1/12 12/16 12/21 12/24 12/31 01/05 1/07   Ice -             NMES (L) quad    10' 10'  L9 10' L9 10' 400us 76 hz 10on 30off  10'  /c quad sets  10'  /c quad sets  D/c

## 2021-01-14 ENCOUNTER — OFFICE VISIT (OUTPATIENT)
Dept: PHYSICAL THERAPY | Facility: CLINIC | Age: 76
End: 2021-01-14
Payer: MEDICARE

## 2021-01-14 DIAGNOSIS — S76.112D RUPTURE, TENDON, QUADRICEPS, LEFT, SUBSEQUENT ENCOUNTER: Primary | ICD-10-CM

## 2021-01-14 PROCEDURE — 97110 THERAPEUTIC EXERCISES: CPT

## 2021-01-14 PROCEDURE — 97530 THERAPEUTIC ACTIVITIES: CPT

## 2021-01-14 NOTE — PROGRESS NOTES
Daily Note     Today's date: 2021  Patient name: Francisco Lund  : 1945  MRN: [id number]  Referring provider: Shyam Saenz PA-C  Dx:   Encounter Diagnosis     ICD-10-CM    1  Rupture, tendon, quadriceps, left, subsequent encounter  S76 133D                   Subjective: Pt states that the knee is continuing to feel good, denying pain this AM  Pt states that he is ready to be done wearing the brace  Objective: See treatment diary below      Assessment: Tolerated treatment well  Pt w/ difficulty maintaining SLS on (L) this AM, demonstrating mod sway and requiring intermittent bouts of HHA/step strategy to maintain balance  Discussed weaning off brace NV as indicated per protocol as pt is entering week 8 of protocol - pt verbalized understanding  Patient demonstrated fatigue post treatment, exhibited good technique with therapeutic exercises and would benefit from continued PT to progress quad strength and knee flex ROM as indicated per protocol to prepare pt for reciprocal stair negotiation  Plan: Continue per plan of care  Progress treament per protocol  Precautions: S/P (L) quad tendon repair 2020 - see protocol  A-fib; hx of DVT (L) leg; aortic aneurysm; hx thyroid cancer; hypothyroidism; impaired glucose tolerance; osteopenia; hyperlipidemia; anxiety; HTN; hx (R) quad tendon repair 2016      Date    FOTO           XX   Re-eval          XX       Manuals    (L) knee flex to 30; ext PROM     60* no end feel 60*    nv   (L) patellar mobs     SP AL        Brace adjustments 90 deg AL        60* flex SP Open to 90 nv   Re-eval          AL                Neuro Re-Ed    Tandem stance on foam      2x30" ea       SLS 2x20" ea 2x20" ea        nv   Biodex weight shifts     2' ML 2' AP L12 2' ML, 2' AP L11       Biodex LOS     2x L12 L11 2x       Kailey negotiation          nv   Quad sets concurrent w/ NMES     10' L9 /c NMES 10' L9 w/ NMES 10' w/ NMES 10' /c NMES 10' /c NMES D/c                Ther Ex 1/12 1/14 12/21 12/24 12/28 12/31 01/05 1/07   Supine gastroc (S) w/ strap 5x20" slant 5x20" slant     3 x 30" 20"x5 slant 20"x5 slant nv   Heelslides to 30 deg flex 10x5" 10x5"   5"x20 60* 20x5" 60 deg 10 x 10" 80deg 10"x10 10"x10 nv   LAQs*          hold   4 way SLR w/ brace 20x ea 20x ea        2x10    Bridges 20x ea 20x ea        nv   Clams          nv   Prone quad (S) 4x15" 4x20"         nv   3 way hip, standing 2x15 ea green 30x ea green   2x10ea (B/L) green  2 x 10 ea green  2x15ea green (B/L) 2x15ea green (B/L) nv   Standing knee flex AROM 2x15 ea green SANDRO 30x ea green      2x15ea green (B/L) 2x15ea green (B/L) nv   Wall sits**                                       Ther Activity 1/12 1/14 12/21 12/24 12/31 01/05 1/07   Recumbent bike rocking  6' with brace 5' with brace        nv   Sidesteps 4 laps 4 laps    nv    4 laps    Mini squats 30x 30x        2x10    Step ups L2 20x ea L2 20x ea        Lv1 2x10   Lateral step ups L2 20x ea L2 20x ea           Step downs*             Sit to stands*             Pt Edu AL AL        AL POC                Gait Training 1/12 1/14 12/21 12/24 12/31 01/05 1/07   WBAT                           Modalities 1/12 1/14 12/21 12/24 12/31 01/05 1/07   Ice -             NMES (L) quad     10'  L9 10' L9 10' 400us 75 hz 10on 30off  10'  /c quad sets  10'  /c quad sets  D/c yes

## 2021-01-19 ENCOUNTER — OFFICE VISIT (OUTPATIENT)
Dept: PHYSICAL THERAPY | Facility: CLINIC | Age: 76
End: 2021-01-19
Payer: MEDICARE

## 2021-01-19 DIAGNOSIS — S76.112D RUPTURE, TENDON, QUADRICEPS, LEFT, SUBSEQUENT ENCOUNTER: Primary | ICD-10-CM

## 2021-01-19 PROCEDURE — 97110 THERAPEUTIC EXERCISES: CPT

## 2021-01-19 PROCEDURE — 97530 THERAPEUTIC ACTIVITIES: CPT

## 2021-01-19 NOTE — PROGRESS NOTES
Daily Note     Today's date: 2021  Patient name: Gonzalo Ugarte  : 1945  MRN: [id number]  Referring provider: Caro Stout PA-C  Dx:   Encounter Diagnosis     ICD-10-CM    1  Rupture, tendon, quadriceps, left, subsequent encounter  S76 112D        Start Time: 1030  Stop Time: 1115  Total time in clinic (min): 45 minutes  Subjective: Pt denies DOMS following LV  Objective: See treatment diary below    Assessment: Tolerated treatment well  Pt demonstrated fatigue post treatment, exhibited good technique with therapeutic exercises and would benefit from continued PT to progress quad strength and knee flex ROM as indicated per protocol to prepare pt for reciprocal stair negotiation  Plan: Cont /c PT POC  Progress as tolerated  Precautions: S/P (L) quad tendon repair 2020 - see protocol  A-fib; hx of DVT (L) leg; aortic aneurysm; hx thyroid cancer; hypothyroidism; impaired glucose tolerance; osteopenia; hyperlipidemia; anxiety; HTN; hx (R) quad tendon repair 2016    Date    FOTO           XX   Re-eval          XX       Manuals    (L) knee flex to 30; ext PROM     60* no end feel 60*    nv   (L) patellar mobs     SP AL        Brace adjustments 90 deg AL        60* flex SP Open to 90 nv   Re-eval          AL                Neuro Re-Ed    Tandem stance on foam      2x30" ea       SLS 2x20" ea 2x20" ea        nv   Biodex weight shifts     2' ML 2' AP L12 2' ML, 2' AP L11       Biodex LOS     2x L12 L11 2x       Kailey negotiation          nv   Quad sets concurrent w/ NMES     10' L9 /c NMES 10' L9 w/ NMES 10' w/ NMES 10' /c NMES 10' /c NMES D/c                Ther Ex    Supine gastroc (S) w/ strap 5x20" slant 5x20" slant 5x20" slant    3 x 30" 20"x5 slant 20"x5 slant nv   Heelslides to 30 deg flex 10x5" 10x5"   5"x20 60* 20x5" 60 deg 10 x 10" 80deg 10"x10 10"x10 nv   LAQs*          hold   4 way SLR w/ brace 20x ea 20x ea 2x15ea       2x10    Bridges 20x ea 20x ea 18 8KB  2x15       nv   Clams          nv   Prone quad (S) 4x15" 4x20"         nv   3 way hip, standing 2x15 ea green 30x ea green 30x ea green  2x10ea (B/L) green  2 x 10 ea green  2x15ea green (B/L) 2x15ea green (B/L) nv   Standing knee flex AROM 2x15 ea green SANDRO 30x ea green 30x ea green     2x15ea green (B/L) 2x15ea green (B/L) nv   Wall sits**                                       Ther Activity 1/12 1/14 01/19 12/21 12/24 12/31 01/05 1/07   Recumbent bike rocking  6' with brace 5' with brace 8' /c brace        nv   Sidesteps 4 laps 4 laps    nv    4 laps    Mini squats 30x 30x        2x10    Step ups L2 20x ea L2 20x ea 2R 2x15ea       Lv1 2x10   Lateral step ups L2 20x ea L2 20x ea 2R 2x15ea          Step downs*             Sit to stands*             Pt Edu AL AL        AL POC                Gait Training 1/12 1/14 01/19 12/21 12/24 12/31 01/05 1/07   WBAT                           Modalities 1/12 1/14 01/19 12/21 12/24 12/31 01/05 1/07   Ice -             NMES (L) quad     10'  L9 10' L9 10' 400us 75 hz 10on 30off  10'  /c quad sets  10'  /c quad sets  D/c       Ash Froylan, PTA

## 2021-01-21 ENCOUNTER — OFFICE VISIT (OUTPATIENT)
Dept: PHYSICAL THERAPY | Facility: CLINIC | Age: 76
End: 2021-01-21
Payer: MEDICARE

## 2021-01-21 DIAGNOSIS — S76.112D RUPTURE, TENDON, QUADRICEPS, LEFT, SUBSEQUENT ENCOUNTER: Primary | ICD-10-CM

## 2021-01-21 PROCEDURE — 97112 NEUROMUSCULAR REEDUCATION: CPT

## 2021-01-21 PROCEDURE — 97530 THERAPEUTIC ACTIVITIES: CPT

## 2021-01-21 NOTE — PROGRESS NOTES
Daily Note     Today's date: 2021  Patient name: Bruno Campo  : 1945  MRN: [id number]  Referring provider: Connie Guerrero PA-C  Dx:   Encounter Diagnosis     ICD-10-CM    1  Rupture, tendon, quadriceps, left, subsequent encounter  S76 112D        Start Time: 1030  Stop Time: 1115  Total time in clinic (min): 45 minutes  Subjective: Pt arrives to PT without (L) knee brace donned  Pt states he is using his elliptical  In approx 3 - 20' increments throughout the day and is working towards his previous routine where he would do 1 hr each AM/PM     Pt states he is "able to do everything he could before"  Objective: See treatment diary below    Assessment: Tolerated progressions of program per surgical protocol  well  Pt challenged /c activities requiring SLS on (L)LE  Pt demonstrates (L) hip circumduction while navigating hurdles - notes "I'm so used to having to do that from wearing the brace" - encouraged pt to focus on NM control and reducing circumduction deviation and encourage (L) hip + knee flex to navigate obstacles  Pt demonstrated fatigue post treatment, exhibited good technique with therapeutic exercises and would benefit from continued PT to progress quad strength and knee flex ROM as indicated per protocol to prepare pt for reciprocal stair negotiation  Plan: Cont /c PT POC  Progress as tolerated  Precautions: S/P (L) quad tendon repair 2020 - see protocol  A-fib; hx of DVT (L) leg; aortic aneurysm; hx thyroid cancer; hypothyroidism; impaired glucose tolerance; osteopenia; hyperlipidemia; anxiety; HTN; hx (R) quad tendon repair 2016    Date    FOTO           XX   Re-eval          XX       Manuals    (L) knee flex to 30; ext PROM      60*    nv   (L) patellar mobs      AL        Brace adjustments 90 deg AL        60* flex SP Open to 90 nv   Re-eval          AL Neuro Re-Ed 1/12 1/14 01/19 01/21 12/24 12/28 12/31 01/05 1/07   Tandem stance on foam      2x30" ea       SLS 2x20" ea 2x20" ea  30"x3 (L)      nv   SLS (L) square taps    3x5         Biodex weight shifts      2' ML, 2' AP L11       Biodex LOS      L11 2x       Kailey negotiation    6" 3 laps  12" 3 laps      nv   Quad sets concurrent w/ NMES      10' L9 w/ NMES 10' w/ NMES 10' /c NMES 10' /c NMES D/c                Ther Ex 1/12 1/14 01/19 01/21 12/24 12/28 12/31 01/05 1/07   Supine gastroc (S) w/ strap 5x20" slant 5x20" slant 5x20" slant    3 x 30" 20"x5 slant 20"x5 slant nv   Heelslides to 30 deg flex 10x5" 10x5"    20x5" 60 deg 10 x 10" 80deg 10"x10 10"x10 nv   LAQs*    3-way 3"x30ea      hold   4 way SLR w/ brace 20x ea 20x ea 2x15ea 3" x30ea      2x10    Bridges 20x ea 20x ea 18 8KB  2x15       nv   Clams    RTB 25ea      nv   Prone quad (S) 4x15" 4x20"         nv   3 way hip, standing 2x15 ea green 30x ea green 30x ea green    2 x 10 ea green  2x15ea green (B/L) 2x15ea green (B/L) nv   Standing knee flex AROM 2x15 ea green SANDRO 30x ea green 30x ea green     2x15ea green (B/L) 2x15ea green (B/L) nv   Wall sits**    20"x4                                   Ther Activity 1/12 1/14 01/19 01/21 12/24 12/31 01/05 1/07   Recumbent bike rocking  6' with brace 5' with brace 8' /c brace        nv   Elliptical    L1 7'          Sidesteps 4 laps 4 laps    nv    4 laps    Mini squats 30x 30x        2x10    Step ups L2 20x ea L2 20x ea 2R 2x15ea       Lv1 2x10   Lateral step ups L2 20x ea L2 20x ea 2R 2x15ea          Step downs*    nv         Sit to stands*    nv         Pt Edu AL AL  SP      AL POC                Gait Training 1/12 1/14 01/19 01/21 12/24 12/31 01/05 1/07   WBAT                           Modalities 1/12 1/14 01/19 01/21 12/24 12/31 01/05 1/07   Ice -             NMES (L) quad      10' L9 10' 400us 75 hz 10on 30off  10'  /c quad sets  10'  /c quad sets  D/c       Lisette Ba, PTA

## 2021-01-26 ENCOUNTER — OFFICE VISIT (OUTPATIENT)
Dept: PHYSICAL THERAPY | Facility: CLINIC | Age: 76
End: 2021-01-26
Payer: MEDICARE

## 2021-01-26 DIAGNOSIS — S76.112D RUPTURE, TENDON, QUADRICEPS, LEFT, SUBSEQUENT ENCOUNTER: Primary | ICD-10-CM

## 2021-01-26 PROCEDURE — 97110 THERAPEUTIC EXERCISES: CPT

## 2021-01-26 PROCEDURE — 97112 NEUROMUSCULAR REEDUCATION: CPT

## 2021-01-26 PROCEDURE — 97530 THERAPEUTIC ACTIVITIES: CPT

## 2021-01-26 NOTE — PROGRESS NOTES
Daily Note     Today's date: 2021  Patient name: Bruno Campo  : 1945  MRN: [id number]  Referring provider: Connie Guerrero PA-C  Dx:   Encounter Diagnosis     ICD-10-CM    1  Rupture, tendon, quadriceps, left, subsequent encounter  S76 112D        Start Time: 1020  Stop Time: 1118  Total time in clinic (min): 58 minutes  Subjective: Pt arrives to today's Tx noting no signfacnt change in (L) knee since LV  Objective: See treatment diary below    Assessment: Pt tolerated Tx well - including addition of step downs, without c/o (L) knee pain  Pt remains challenged /c SLS activities (B/L) - demonstrates fair/good balance during tandem stance on foam   Pt demonstrated fatigue post treatment, exhibited good technique with therapeutic exercises and would benefit from continued PT to progress quad strength and knee flex ROM as indicated per protocol to prepare pt for reciprocal stair negotiation  Plan: Cont /c PT POC  Progress as tolerated  Precautions: S/P (L) quad tendon repair 2020 - see protocol  A-fib; hx of DVT (L) leg; aortic aneurysm; hx thyroid cancer; hypothyroidism; impaired glucose tolerance; osteopenia; hyperlipidemia; anxiety; HTN; hx (R) quad tendon repair 2016    Date    FOTO           XX   Re-eval          XX       Manuals    (L) knee flex to 30; ext PROM          nv   (L) patellar mobs             Brace adjustments 90 deg AL        60* flex SP Open to 90 nv   Re-eval          AL                Neuro Re-Ed    Tandem stance on foam     20"ea   20"xea Airex        SLS 2x20" ea 2x20" ea  30"x3 (L) 6x to failure     nv   SLS (L) square taps    3x5         Biodex weight shifts             Biodex LOS             Kailey negotiation    6" 3 laps  12" 3 laps      nv   Quad sets concurrent w/ NMES        10' /c NMES 10' /c NMES D/c Ther Ex 1/12 1/14 01/19 01/21 01/26 12/31 01/05 1/07   Supine gastroc (S) w/ strap 5x20" slant 5x20" slant 5x20" slant     20"x5 slant 20"x5 slant nv   Heelslides to 30 deg flex 10x5" 10x5"      10"x10 10"x10 nv   LAQs*    3-way 3"x30ea 3-way 2# (L) 3"x30ea     hold   4 way SLR w/ brace 20x ea 20x ea 2x15ea 3" x30ea 3" 2# (L)  30ea     2x10    Bridges 20x ea 20x ea 18 8KB  2x15       nv   Clams    RTB 25ea      nv   Prone quad (S) 4x15" 4x20"         nv   3 way hip, standing 2x15 ea green 30x ea green 30x ea green     2x15ea green (B/L) 2x15ea green (B/L) nv   Standing knee flex AROM 2x15 ea green SANDRO 30x ea green 30x ea green     2x15ea green (B/L) 2x15ea green (B/L) nv   Wall sits**    20"x4                                   Ther Activity 1/12 1/14 01/19 01/21 01/26 12/31 01/05 1/07   Recumbent bike rocking  6' with brace 5' with brace 8' /c brace        nv   Elliptical    L1 7'  L1 10'         Sidesteps 4 laps 4 laps        4 laps    Mini squats 30x 30x        2x10    Step ups L2 20x ea L2 20x ea 2R 2x15ea       Lv1 2x10   Lateral step ups L2 20x ea L2 20x ea 2R 2x15ea          Step downs*    nv 0Rx10  2Rx30        Sit to stands*    nv nv        Pt Edu AL AL  SP      AL POC                Gait Training 1/12 1/14 01/19 01/21 01/26 12/31 01/05 1/07   WBAT                           Modalities 1/12 1/14 01/19 01/21 01/26 12/31 01/05 1/07   Ice -             NMES (L) quad        10'  /c quad sets  10'  /c quad sets  D/c       Jonathan Beagle, PTA

## 2021-01-28 ENCOUNTER — OFFICE VISIT (OUTPATIENT)
Dept: PHYSICAL THERAPY | Facility: CLINIC | Age: 76
End: 2021-01-28
Payer: MEDICARE

## 2021-01-28 DIAGNOSIS — S76.112D RUPTURE, TENDON, QUADRICEPS, LEFT, SUBSEQUENT ENCOUNTER: Primary | ICD-10-CM

## 2021-01-28 DIAGNOSIS — M25.532 LEFT WRIST PAIN: ICD-10-CM

## 2021-01-28 PROCEDURE — 97530 THERAPEUTIC ACTIVITIES: CPT

## 2021-01-28 NOTE — PROGRESS NOTES
Daily Note     Today's date: 2021  Patient name: Madeline Cortez  : 1945  MRN: [id number]  Referring provider: Molly Montejo PA-C  Dx:   Encounter Diagnosis     ICD-10-CM    1  Rupture, tendon, quadriceps, left, subsequent encounter  [id number]                     Subjective: Pt states that his knee is feeling good and he is ready to be d/c from therapy  Pt reports min to no functional limitations at this time aside from minor difficulty sleeping at night d/t occasional inc ant knee pain  Pt states that he is back to using the elliptical 2x daily as he did prior to surgery  Objective: See treatment diary below      Assessment: Tolerated treatment well  During today's tx session, discussed prognosis; sleeping position (placing pillow btw knees, stretching of knee upon waking up/prior to bed time); and reviewed protocol progressions/reiterated remaining precautions  Pt denied questions and reports compliance w/ HEP to date (offered copy however pt deferred)  Pt has requested to be d/c from therapy at this time d/t having to returned to his PLOF  Plan: Discharge w/ HEP  Pt instructed to f/u w/ therapist if any questions arise in the future  Precautions: S/P (L) quad tendon repair 2020 - see protocol  A-fib; hx of DVT (L) leg; aortic aneurysm; hx thyroid cancer; hypothyroidism; impaired glucose tolerance; osteopenia; hyperlipidemia; anxiety; HTN; hx (R) quad tendon repair 2016    Date    FOTO       XX    XX   Re-eval          XX       Manuals    (L) knee flex to 30; ext PROM          nv   (L) patellar mobs             Brace adjustments 90 deg AL        60* flex SP Open to 90 nv   Re-eval          AL                Neuro Re-Ed    Tandem stance on foam     20"ea   20"xea Airex        SLS 2x20" ea 2x20" ea  30"x3 (L) 6x to failure nv   SLS (L) square taps    3x5         Biodex weight shifts             Biodex LOS             Kailey negotiation    6" 3 laps  12" 3 laps      nv   Quad sets concurrent w/ NMES        10' /c NMES 10' /c NMES D/c                Ther Ex 1/12 1/14 01/19 01/21 01/26 1/28 12/31 01/05 1/07   Supine gastroc (S) w/ strap 5x20" slant 5x20" slant 5x20" slant     20"x5 slant 20"x5 slant nv   Heelslides to 30 deg flex 10x5" 10x5"      10"x10 10"x10 nv   LAQs*    3-way 3"x30ea 3-way 2# (L) 3"x30ea     hold   4 way SLR w/ brace 20x ea 20x ea 2x15ea 3" x30ea 3" 2# (L)  30ea     2x10    Bridges 20x ea 20x ea 18 8KB  2x15       nv   Clams    RTB 25ea      nv   Prone quad (S) 4x15" 4x20"         nv   3 way hip, standing 2x15 ea green 30x ea green 30x ea green     2x15ea green (B/L) 2x15ea green (B/L) nv   Standing knee flex AROM 2x15 ea green SANDRO 30x ea green 30x ea green     2x15ea green (B/L) 2x15ea green (B/L) nv   Wall sits**    20"x4                                   Ther Activity 1/12 1/14 01/19 01/21 01/26 1/28 12/31 01/05 1/07   Recumbent bike rocking  6' with brace 5' with brace 8' /c brace        nv   Elliptical    L1 7'  L1 10'  10' L1       Sidesteps 4 laps 4 laps        4 laps    Mini squats 30x 30x        2x10    Step ups L2 20x ea L2 20x ea 2R 2x15ea       Lv1 2x10   Lateral step ups L2 20x ea L2 20x ea 2R 2x15ea          Step downs*    nv 0Rx10  2Rx30        Sit to stands*    nv nv        Pt Edu AL AL  SP  AL POC, protocol    AL POC                Gait Training 1/12 1/14 01/19 01/21 01/26 1/28 12/31 01/05 1/07   WBAT                           Modalities 1/12 1/14 01/19 01/21 01/26 1/28 12/31 01/05 1/07   Ice -             NMES (L) quad        10'  /c quad sets  10'  /c quad sets  D/c

## 2021-01-29 ENCOUNTER — IMMUNIZATIONS (OUTPATIENT)
Dept: FAMILY MEDICINE CLINIC | Facility: HOSPITAL | Age: 76
End: 2021-01-29

## 2021-01-29 DIAGNOSIS — Z23 ENCOUNTER FOR IMMUNIZATION: Primary | ICD-10-CM

## 2021-01-29 PROCEDURE — 0011A SARS-COV-2 / COVID-19 MRNA VACCINE (MODERNA) 100 MCG: CPT

## 2021-01-29 PROCEDURE — 91301 SARS-COV-2 / COVID-19 MRNA VACCINE (MODERNA) 100 MCG: CPT

## 2021-02-25 ENCOUNTER — IMMUNIZATIONS (OUTPATIENT)
Dept: FAMILY MEDICINE CLINIC | Facility: HOSPITAL | Age: 76
End: 2021-02-25

## 2021-02-25 DIAGNOSIS — Z23 ENCOUNTER FOR IMMUNIZATION: Primary | ICD-10-CM

## 2021-02-25 PROCEDURE — 0012A SARS-COV-2 / COVID-19 MRNA VACCINE (MODERNA) 100 MCG: CPT

## 2021-02-25 PROCEDURE — 91301 SARS-COV-2 / COVID-19 MRNA VACCINE (MODERNA) 100 MCG: CPT

## 2021-03-05 ENCOUNTER — HOSPITAL ENCOUNTER (OUTPATIENT)
Dept: CT IMAGING | Facility: HOSPITAL | Age: 76
Discharge: HOME | End: 2021-03-05
Payer: MEDICARE

## 2021-03-05 DIAGNOSIS — I71.2 ANEURYSM, ASCENDING AORTA (HCC): ICD-10-CM

## 2021-03-05 PROCEDURE — 71250 CT THORAX DX C-: CPT

## 2021-03-09 ENCOUNTER — OFFICE VISIT (OUTPATIENT)
Dept: CARDIAC SURGERY | Facility: CLINIC | Age: 76
End: 2021-03-09
Payer: MEDICARE

## 2021-03-09 VITALS
TEMPERATURE: 97.5 F | DIASTOLIC BLOOD PRESSURE: 74 MMHG | BODY MASS INDEX: 25.74 KG/M2 | HEART RATE: 52 BPM | RESPIRATION RATE: 16 BRPM | OXYGEN SATURATION: 97 % | HEIGHT: 69 IN | WEIGHT: 173.8 LBS | SYSTOLIC BLOOD PRESSURE: 136 MMHG

## 2021-03-09 DIAGNOSIS — I71.2 ANEURYSM, ASCENDING AORTA (HCC): Primary | ICD-10-CM

## 2021-03-09 PROCEDURE — 99214 OFFICE O/P EST MOD 30 MIN: CPT | Performed by: NURSE PRACTITIONER

## 2021-03-09 NOTE — PROGRESS NOTES
Aortic Clinic  Fernandez Gutierrez  66 y o  male MRN: [id number]    PCP: Abbie Nielson DO  Cardiologist: Kamilla Cohen MD    Reason for Consult / Principal Problem: Ascending aortic aneurysm    History of Present Illness: Fernandez Gutierrez  is a 66y o  year old male who presents today for ongoing surveillance of an asecending aortic aneurysm  This was initially identified as an incidental finding in August 2018 and measured 43 x 45 mm in maximal diameter  Echo in 2016 demonstrated a normal trileaflet aortic valve  Patient has been followed in our aortic clinic since that time with serial imaging that has remained stable  Fernandez Gutierrez  was most recently evaluated in our office in March 2019 and at that time the maximal ascending aortic diameter was measureed at 43 x 46 mm  Upon interview today, patient reports he has been well since his last visit here  In November 2020 he underwent surgical repair of a ruptured quadriceps tendon  He has completed PT and is doing well  He denies chest pain, back pain, abdominal pain, SOB, lightheadedness, numbness, tingling, weakness or TIA/CVA like symptoms  His wife is a nurse and she checks his BP daily  He reports it generally runs WNL, for example, last night his BP was 128/72  He reports if he feels anxious his BP with run higher  Patient is a non-smoker  He currently take Metoprolol and Lisinopril for HTN, Pravastatin for HLD and ASA and Xarelto for AFib       Past Medical History:  Past Medical History:   Diagnosis Date    A-fib [facility]) 11/2002    Anxiety 01/15/2002    Aortic aneurysm ([facility] ) 08/26/2016    Arthritis     neck    At[facility] ([facility] )     last assessed: 7/26/2017    Cancer (HCC)     skin, thyroid     Disease of thyroid gland     Disorder of epididymis     H/O malignant neoplasm of thyroid     Hyperlipidemia     last assessed: 6/29/2017    Hypertension     Left leg DVT [facility] ) 03/07/2017    Osteopenia     Psychiatric disorder     Pulmonary embolism ([facility] )     last assessed: 7/26/2017         Past Surgical History:   Past Surgical History:   Procedure Laterality Date    APPENDECTOMY      COLONOSCOPY      complete    CYSTOSCOPY  02/01/2016    diagnostic    LEG SURGERY      SQUAMOUS CELL CARCINOMA EXCISION      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         Family History:  Family History   Problem Relation Age of Onset    Coronary artery disease Father     Heart attack Father         acute myocardial infarction    Stroke Mother     Hypertension Mother          Social History:    Social History     Substance and Sexual Activity   Alcohol Use No     Social History     Substance and Sexual Activity   Drug Use No     Social History     Tobacco Use   Smoking Status Former Smoker   Smokeless Tobacco Never Used   Tobacco Comment    quit years ago         Home Medications:   Prior to Admission medications    Medication Sig Start Date End Date Taking? Authorizing Provider   ALPRAZolam Rannie [place]) 0 5 mg tablet Take by mouth daily at bedtime as needed for anxiety   Yes Historical Provider, MD   aspirin (ECOTRIN LOW STRENGTH) 81 mg EC tablet Take 81 mg by mouth daily   Yes Historical Provider, MD   levothyroxine 125 mcg tablet Take 1 tablet (125 mcg total) by mouth daily in the early morning 6/16/20  Yes Rufina Ambrocio DO   lisinopril (ZESTRIL) 20 mg tablet Take 1 tablet (20 mg total) by mouth daily 11/30/20  Yes Rufina Ambrocio DO   metoprolol succinate (TOPROL-XL) 25 mg 24 hr tablet Take 1 tablet by mouth daily 10/26/20  Yes Rufina Ambrocio DO   pravastatin (PRAVACHOL) 10 mg tablet Take 1 tablet (10 mg total) by mouth daily 6/15/20  Yes Rufina Ambrocio DO   Xarelto 20 MG tablet TAKE 1 TABLET DAILY 12/14/20  Yes Rufina Ambrocio DO       Allergies:   Allergies   Allergen Reactions    Percocet [Oxycodone-Acetaminophen] Seizures    Penicillins      "nearly passed out"       Review of Systems:   Review of Systems - History obtained from chart review and the patient  General ROS: negative  Psychological ROS: negative  Ophthalmic ROS: negative  ENT ROS: negative  Allergy and Immunology ROS: negative  Hematological and Lymphatic ROS: negative  Endocrine ROS: negative  Respiratory ROS: no cough, shortness of breath, or wheezing  Cardiovascular ROS: no chest pain or dyspnea on exertion  Gastrointestinal ROS: no abdominal pain, change in bowel habits, or black or bloody stools  Genito-Urinary ROS: no dysuria, trouble voiding, or hematuria  Musculoskeletal ROS: negative  Neurological ROS: no TIA or stroke symptoms  Dermatological ROS: negative    Vital Signs:   Vitals:    03/09/21 0944 03/09/21 0949   BP: 130/90 136/74   BP Location: Left arm Right arm   Patient Position: Sitting Sitting   Cuff Size: Adult Adult   Pulse: (!) 52    Resp: 16    Temp: 97 5 °F (36 4 °C)    TempSrc: Tympanic    SpO2:  97%   Weight: 78 8 kg (173 lb 12 8 oz)    Height: 5' 9" (1 753 m)        Physical Exam:  General: Alert, oriented, well developed, no acute distress  HEENT/NECK:  PERRLA  No jugular venous distention  Cardiac:Regular rate and rhythm, No murmurs rubs or gallops  Carotid arteries: 2+ pulses, no bruits  Pulmonary:  Breath sounds clear bilaterally  Abdomen:  Non-tender, Non-distended  Positive bowel sounds  Upper extremities: 2+ radial pulses; brisk capillary refill  Lower extremities: Extremities warm/dry  PT/DP pulses 2+ bilaterally  Trace edema B/L  Neuro: Alert and oriented X 3  Sensation is grossly intact  No focal deficits  Musculoskeletal: MAEE, stable gait, no deficits  Skin: Warm/Dry, without rashes or lesions      Lab Results:   Lab Results   Component Value Date    SODIUM 134 (L) 11/15/2020    K 4 6 11/15/2020     11/15/2020    CO2 27 11/15/2020    BUN 19 11/15/2020    CREATININE 0 68 11/15/2020    GLUC 103 11/15/2020    CALCIUM 8 7 11/15/2020     Lab Results   Component Value Date    HGBA1C 5 9 (H) 11/09/2020 Imaging Studies:     CT Chest: 3/5/21  44 x 45 mm, stable      I have personally reviewed pertinent films in PACS    Assessment:  Patient Active Problem List    Diagnosis Date Noted    Traumatic rupture of quadriceps tendon 11/09/2020    Other pulmonary embolism without acute cor pulmonale ([facility] ) 06/24/2020    Hematospermia 01/06/2020    Pulmonary nodules 03/27/2017    Embolism and thrombosis of [facility] vein[facility] left [facility] ) 03/13/2017    Aneurysm, ascending aorta ([facility] ) 09/01/2016    Thyroid cancer ([place] [address] ) 02/12/2014    Anxiety 12/17/2013    Sick sinus syndrome ([facility] ) 07/03/2013    Sinus bradycardia 07/03/2013    Paroxysmal atrial fibrillation ([facility] ) 07/02/2013    Pre-diabetes 05/17/2013    Vitamin D deficiency 04/05/2013    Postprocedural hypothyroidism 02/19/2013    Esophageal reflux 08/17/2012    Osteoporosis 06/08/2012    Benign essential hypertension 05/03/2012    Hyperlipidemia 05/03/2012         Plan:    CT imaging performed prior to this visit demonstrates the ascending aorta measuring 44 x 45 mm in size at its greatest diameter  This is a stable finding, unchanged in comparison to prior imaging dating back to 8/2018  These findings were confirmed and shared with the patient today        Patient does not meet surgical criteria ( see below) at current aneurysm size:  Ascending aortic aneurysm surgical triggers:  * 4 5 cm or > in the setting of + FH of rupture or dissection  * 5 cm with moderate or worse aortic valve disease  *  5 5 cm regardless of aortic valve function and without genetically associated aortic disease   *  Aortic growth > 4mm / year  *  Bicuspid aortic valve with valve dysfunction enough to meet indications for surgery and concomitant ascending aortic aneurysm 4 5 cm or >  * 4 5 cm or > in setting of existing connective tissue disease of Marfan's syndrome, Abdelrahman-Danlos syndrome or familiar aneurysms syndrome      Patient remains are asymptomatic, normotensive and has no family history  Follow-up monitoring is the treatment plan  Arrangements have been made for future surveillance to be completed with CT chest, without contrast in 2 years  When he returns in 2 years, he will be 76 and if aneurysm remains stable, we may recommend no further CT imaging surveillance as patients advanced age presents higher risk vs benefit  with surgical repair  Shikha Luque  was comfortable with our recommendations, and his questions were answered to his satisfaction  Aortic Aneurysm Instructions were provided to the patient as follows:    1  No lifting more than 50 pounds  Regular aerobic exercise permitted and recommended  2  Maintain a controlled blood pressure with a goal of less than 140/80  3  Follow up in Aortic Clinic as recommended with radiology follow up as instructed  4  Report to the ER or call 911 immediately with the following signs / symptoms: sudden onset of back pain, chest pain or shortness of breath      Routine referral to gastroenterology for colonoscopy screening was not indicated, as the patient is over 74years old    SIGNATURE: WILLIAM Bliss  DATE: March 9, 2021  TIME: 10:09 AM

## 2021-03-24 DIAGNOSIS — F41.9 ANXIETY: Primary | ICD-10-CM

## 2021-03-24 RX ORDER — ALPRAZOLAM 0.5 MG/1
0.5 TABLET ORAL
Qty: 90 TABLET | Refills: 0 | Status: SHIPPED | OUTPATIENT
Start: 2021-03-24 | End: 2022-03-09 | Stop reason: SDUPTHER

## 2021-04-29 ENCOUNTER — TELEPHONE (OUTPATIENT)
Dept: FAMILY MEDICINE CLINIC | Facility: CLINIC | Age: 76
End: 2021-04-29

## 2021-04-29 DIAGNOSIS — E78.2 MIXED HYPERLIPIDEMIA: ICD-10-CM

## 2021-04-29 DIAGNOSIS — E55.9 VITAMIN D DEFICIENCY: ICD-10-CM

## 2021-04-29 DIAGNOSIS — R73.03 PRE-DIABETES: ICD-10-CM

## 2021-04-29 DIAGNOSIS — I10 BENIGN ESSENTIAL HYPERTENSION: Primary | ICD-10-CM

## 2021-04-29 DIAGNOSIS — Z00.00 HEALTH CARE MAINTENANCE: ICD-10-CM

## 2021-04-29 DIAGNOSIS — E03.9 HYPOTHYROIDISM, UNSPECIFIED TYPE: ICD-10-CM

## 2021-04-29 DIAGNOSIS — Z12.5 SCREENING FOR PROSTATE CANCER: ICD-10-CM

## 2021-04-29 NOTE — TELEPHONE ENCOUNTER
Ed is scheduled for his AWV 06/28/21  Pt is asking can you please orders so he can go get labs prior to this appointment  Pt uses [facility]  Pt's number is [phone] please call once this is done

## 2021-05-18 DIAGNOSIS — E78.2 MIXED HYPERLIPIDEMIA: ICD-10-CM

## 2021-05-18 RX ORDER — PRAVASTATIN SODIUM 10 MG
TABLET ORAL
Qty: 90 TABLET | Refills: 3 | Status: SHIPPED | OUTPATIENT
Start: 2021-05-18 | End: 2022-05-13

## 2021-05-25 DIAGNOSIS — C73 THYROID CANCER (HCC): ICD-10-CM

## 2021-05-25 DIAGNOSIS — E03.9 HYPOTHYROIDISM, UNSPECIFIED TYPE: ICD-10-CM

## 2021-05-25 RX ORDER — LEVOTHYROXINE SODIUM 0.12 MG/1
TABLET ORAL
Qty: 90 TABLET | Refills: 3 | Status: SHIPPED | OUTPATIENT
Start: 2021-05-25 | End: 2021-06-01

## 2021-06-01 ENCOUNTER — LAB (OUTPATIENT)
Dept: LAB | Facility: HOSPITAL | Age: 76
End: 2021-06-01
Payer: MEDICARE

## 2021-06-01 DIAGNOSIS — Z12.5 SCREENING FOR PROSTATE CANCER: ICD-10-CM

## 2021-06-01 DIAGNOSIS — E78.2 MIXED HYPERLIPIDEMIA: ICD-10-CM

## 2021-06-01 DIAGNOSIS — Z00.00 HEALTH CARE MAINTENANCE: ICD-10-CM

## 2021-06-01 DIAGNOSIS — R73.03 PRE-DIABETES: ICD-10-CM

## 2021-06-01 DIAGNOSIS — E03.9 HYPOTHYROIDISM, UNSPECIFIED TYPE: Primary | ICD-10-CM

## 2021-06-01 DIAGNOSIS — E03.9 HYPOTHYROIDISM, UNSPECIFIED TYPE: ICD-10-CM

## 2021-06-01 LAB
ALBUMIN SERPL BCP-MCNC: 4.1 G/DL (ref 3.5–5)
ALP SERPL-CCNC: 68 U/L (ref 46–116)
ALT SERPL W P-5'-P-CCNC: 27 U/L (ref 12–78)
ANION GAP SERPL CALCULATED.3IONS-SCNC: 9 MMOL/L (ref 4–13)
AST SERPL W P-5'-P-CCNC: 27 U/L (ref 5–45)
BASOPHILS # BLD AUTO: 0.05 THOUSANDS/ΜL (ref 0–0.1)
BASOPHILS NFR BLD AUTO: 1 % (ref 0–1)
BILIRUB SERPL-MCNC: 1.66 MG/DL (ref 0.2–1)
BUN SERPL-MCNC: 15 MG/DL (ref 5–25)
CALCIUM SERPL-MCNC: 8.9 MG/DL (ref 8.3–10.1)
CHLORIDE SERPL-SCNC: 105 MMOL/L (ref 100–108)
CHOLEST SERPL-MCNC: 155 MG/DL (ref 50–200)
CO2 SERPL-SCNC: 26 MMOL/L (ref 21–32)
CREAT SERPL-MCNC: 0.83 MG/DL (ref 0.6–1.3)
EOSINOPHIL # BLD AUTO: 0.1 THOUSAND/ΜL (ref 0–0.61)
EOSINOPHIL NFR BLD AUTO: 2 % (ref 0–6)
ERYTHROCYTE [DISTWIDTH] IN BLOOD BY AUTOMATED COUNT: 13.9 % (ref 11.6–15.1)
EST. AVERAGE GLUCOSE BLD GHB EST-MCNC: 120 MG/DL
GFR SERPL CREATININE-BSD FRML MDRD: 85 ML/MIN/1.73SQ M
GLUCOSE P FAST SERPL-MCNC: 110 MG/DL (ref 65–99)
HBA1C MFR BLD: 5.8 %
HCT VFR BLD AUTO: 43.6 % (ref 36.5–49.3)
HDLC SERPL-MCNC: 54 MG/DL
HGB BLD-MCNC: 14.2 G/DL (ref 12–17)
IMM GRANULOCYTES # BLD AUTO: 0.01 THOUSAND/UL (ref 0–0.2)
IMM GRANULOCYTES NFR BLD AUTO: 0 % (ref 0–2)
LDLC SERPL CALC-MCNC: 96 MG/DL (ref 0–100)
LYMPHOCYTES # BLD AUTO: 2.63 THOUSANDS/ΜL (ref 0.6–4.47)
LYMPHOCYTES NFR BLD AUTO: 44 % (ref 14–44)
MCH RBC QN AUTO: 29.6 PG (ref 26.8–34.3)
MCHC RBC AUTO-ENTMCNC: 32.6 G/DL (ref 31.4–37.4)
MCV RBC AUTO: 91 FL (ref 82–98)
MONOCYTES # BLD AUTO: 0.46 THOUSAND/ΜL (ref 0.17–1.22)
MONOCYTES NFR BLD AUTO: 8 % (ref 4–12)
NEUTROPHILS # BLD AUTO: 2.71 THOUSANDS/ΜL (ref 1.85–7.62)
NEUTS SEG NFR BLD AUTO: 45 % (ref 43–75)
NRBC BLD AUTO-RTO: 0 /100 WBCS
PLATELET # BLD AUTO: 250 THOUSANDS/UL (ref 149–390)
PMV BLD AUTO: 10.6 FL (ref 8.9–12.7)
POTASSIUM SERPL-SCNC: 5.3 MMOL/L (ref 3.5–5.3)
PROT SERPL-MCNC: 7.3 G/DL (ref 6.4–8.2)
PSA SERPL-MCNC: 2.3 NG/ML (ref 0–4)
RBC # BLD AUTO: 4.79 MILLION/UL (ref 3.88–5.62)
SODIUM SERPL-SCNC: 140 MMOL/L (ref 136–145)
T4 FREE SERPL-MCNC: 1.17 NG/DL (ref 0.76–1.46)
TRIGL SERPL-MCNC: 25 MG/DL
TSH SERPL DL<=0.05 MIU/L-ACNC: 0.14 UIU/ML (ref 0.36–3.74)
WBC # BLD AUTO: 5.96 THOUSAND/UL (ref 4.31–10.16)

## 2021-06-01 PROCEDURE — 84439 ASSAY OF FREE THYROXINE: CPT | Performed by: FAMILY MEDICINE

## 2021-06-01 PROCEDURE — G0103 PSA SCREENING: HCPCS

## 2021-06-01 PROCEDURE — 83036 HEMOGLOBIN GLYCOSYLATED A1C: CPT | Performed by: FAMILY MEDICINE

## 2021-06-01 PROCEDURE — 85025 COMPLETE CBC W/AUTO DIFF WBC: CPT

## 2021-06-01 PROCEDURE — 36415 COLL VENOUS BLD VENIPUNCTURE: CPT | Performed by: FAMILY MEDICINE

## 2021-06-01 PROCEDURE — 84443 ASSAY THYROID STIM HORMONE: CPT

## 2021-06-01 PROCEDURE — 80061 LIPID PANEL: CPT

## 2021-06-01 PROCEDURE — 80053 COMPREHEN METABOLIC PANEL: CPT

## 2021-06-01 RX ORDER — LEVOTHYROXINE SODIUM 112 UG/1
112 TABLET ORAL
Qty: 30 TABLET | Refills: 3 | Status: SHIPPED | OUTPATIENT
Start: 2021-06-01 | End: 2021-06-15 | Stop reason: SDUPTHER

## 2021-06-01 NOTE — TELEPHONE ENCOUNTER
Spoke w/ pt and gave results and ordered repeat blood work for 8 weeks  Please send medication to pharmacy   Pt would only like a 30 day supply because he will use mail order next month

## 2021-06-01 NOTE — TELEPHONE ENCOUNTER
----- Message from Austen Rm DO sent at 6/1/2021 10:14 AM EDT -----  Please call the patient regarding his abnormal result  TSH is low, needs to decrease levothyroxine to next lower dose  Start levothyroxine 112 mcg daily disp #30 with 5 refills and recheck tsh and t4 free in 8 weeks  PSA still pending and other labs stable

## 2021-06-08 ENCOUNTER — OFFICE VISIT (OUTPATIENT)
Dept: CARDIOLOGY CLINIC | Facility: CLINIC | Age: 76
End: 2021-06-08
Payer: MEDICARE

## 2021-06-08 VITALS
WEIGHT: 174.4 LBS | SYSTOLIC BLOOD PRESSURE: 124 MMHG | DIASTOLIC BLOOD PRESSURE: 72 MMHG | HEART RATE: 57 BPM | BODY MASS INDEX: 25.75 KG/M2

## 2021-06-08 DIAGNOSIS — I48.0 PAROXYSMAL ATRIAL FIBRILLATION (HCC): Primary | ICD-10-CM

## 2021-06-08 DIAGNOSIS — E78.2 MIXED HYPERLIPIDEMIA: ICD-10-CM

## 2021-06-08 DIAGNOSIS — I71.2 ANEURYSM, ASCENDING AORTA (HCC): ICD-10-CM

## 2021-06-08 DIAGNOSIS — I10 BENIGN ESSENTIAL HYPERTENSION: ICD-10-CM

## 2021-06-08 DIAGNOSIS — R00.1 SINUS BRADYCARDIA: ICD-10-CM

## 2021-06-08 PROCEDURE — 99213 OFFICE O/P EST LOW 20 MIN: CPT | Performed by: INTERNAL MEDICINE

## 2021-06-08 NOTE — PROGRESS NOTES
Cardiology Follow Up    Ethel Wan   1945  [id number]  [facility] [place]  [address]  [facility] [address] [phone]    Reason for visit:  9 month follow-up for paroxysmal atrial fibrillation, sick sinus syndrome with sinus bradycardia, hypertension, hyperlipidemia, moderate aortic root enlargement for which he follows with CT surgery and history of DVT      1  Paroxysmal atrial fibrillation (HCC)     2  Sinus bradycardia     3  Aneurysm, ascending aorta ([address] )     4  Benign essential hypertension     5  Mixed hyperlipidemia         Interval History:  Since his last visit he denies chest pain, shortness of breath, palpitations, edema or lightheadedness  His blood pressures have been very stable      Patient Active Problem List   Diagnosis    Aneurysm, ascending aorta (HCC)    Anxiety    Benign essential hypertension    Esophageal reflux    Hyperlipidemia    Paroxysmal atrial fibrillation (HCC)    Postprocedural hypothyroidism    Pulmonary nodules    Sick sinus syndrome (HCC)    Sinus bradycardia    [facility] )    Vitamin D deficiency    Pre-diabetes    Osteoporosis    Embolism and thrombosis of tibial vein, left (HCC)    Hematospermia    Other pulmonary embolism without acute cor pulmonale (HCC)    Traumatic rupture of quadriceps tendon     Past Medical History:   Diagnosis Date    A-fib ([place] [address] ) 11/2002    Anxiety 01/15/2002    A[facility] ) 08/26/2016    Arthritis     neck    Atrial fibrillation (HCC)     last assessed: 7/26/2017    Cancer (HCC)     skin, thyroid     Disease of thyroid gland     Disorder of epididymis     H/O malignant neoplasm of thyroid     Hyperlipidemia     last assessed: 6/29/2017    Hypertension     Left leg DVT ([facility] ) 03/07/2017    Osteopenia     Psychiatric disorder     Pulmonary embolism ([facility] )     last assessed: 7/26/2017     Social History Socioeconomic History    Marital status: /Civil Union     Spouse name: Not on file    Number of children: Not on file    Years of education: Not on file    Highest education level: Not on file   Occupational History    Not on file   Social Needs    Financial resource strain: Not on file    Food insecurity     Worry: Not on file     Inability: Not on file    Transportation needs     Medical: Not on file     Non-medical: Not on file   Tobacco Use    Smoking status: Former Smoker    Smokeless tobacco: Never Used    Tobacco comment: quit years ago   Substance and Sexual Activity    Alcohol use: No    Drug use: No    Sexual activity: Not on file   Lifestyle    Physical activity     Days per week: Not on file     Minutes per session: Not on file    Stress: Not on file   Relationships    Social connections     Talks on phone: Not on file     Gets together: Not on file     Attends Hoahaoism service: Not on file     Active member of club or organization: Not on file     Attends meetings of clubs or organizations: Not on file     Relationship status: Not on file    Intimate partner violence     Fear of current or ex partner: Not on file     Emotionally abused: Not on file     Physically abused: Not on file     Forced sexual activity: Not on file   Other Topics Concern    Not on file   Social History Narrative    Not on file      Family History   Problem Relation Age of Onset    Coronary artery disease Father     Heart attack Father         acute myocardial infarction    Stroke Mother     Hypertension Mother      Past Surgical History:   Procedure Laterality Date    APPENDECTOMY      COLONOSCOPY      complete    CYSTOSCOPY  02/01/2016    diagnostic    LEG SURGERY      SQUAMOUS CELL CARCINOMA EXCISION      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       Current Outpatient Medications:     ALPRAZolam (XANAX) 0 5 mg tablet, Take 1 tablet (0 5 mg total) by mouth daily at bedtime as needed for anxiety, Disp: 90 tablet, Rfl: 0    aspirin (ECOTRIN LOW STRENGTH) 81 mg EC tablet, Take 81 mg by mouth daily, Disp: , Rfl:     levothyroxine 112 mcg tablet, Take 1 tablet (112 mcg total) by mouth daily in the early morning, Disp: 30 tablet, Rfl: 3    lisinopril (ZESTRIL) 20 mg tablet, Take 1 tablet (20 mg total) by mouth daily, Disp: 90 tablet, Rfl: 5    metoprolol succinate (TOPROL-XL) 25 mg 24 hr tablet, Take 1 tablet by mouth daily, Disp: 90 tablet, Rfl: 3    pravastatin (PRAVACHOL) 10 mg tablet, TAKE 1 TABLET DAILY, Disp: 90 tablet, Rfl: 3    Xarelto 20 MG tablet, TAKE 1 TABLET DAILY, Disp: 90 tablet, Rfl: 3  Allergies   Allergen Reactions    Percocet [Oxycodone-Acetaminophen] Seizures    Penicillins      "nearly passed out"       Review of Systems:  Review of Systems   Constitutional: Negative for activity change, appetite change, fatigue and unexpected weight change  Respiratory: Negative for cough, shortness of breath and wheezing  Cardiovascular: Negative for chest pain, palpitations and leg swelling  Gastrointestinal: Negative for abdominal pain, blood in stool, constipation and diarrhea  Genitourinary: Negative for frequency and hematuria  Musculoskeletal: Positive for arthralgias  Negative for back pain, gait problem and joint swelling  Neurological: Negative for dizziness, light-headedness and headaches  Physical Exam:  Vitals:    06/08/21 1021   BP: 124/72   BP Location: Left arm   Patient Position: Sitting   Cuff Size: Large   Pulse: 57   Weight: 79 1 kg (174 lb 6 4 oz)       Physical Exam  Constitutional:       General: He is not in acute distress  Appearance: He is normal weight  He is not ill-appearing  HENT:      Head: Normocephalic and atraumatic  Mouth/Throat:      Mouth: Mucous membranes are moist       Pharynx: No oropharyngeal exudate or posterior oropharyngeal erythema  Eyes:      General: No scleral icterus  Conjunctiva/sclera: Conjunctivae normal    Cardiovascular:      Rate and Rhythm: Regular rhythm  Bradycardia present  Pulses: Normal pulses  Heart sounds: Murmur (Grade 1 apical systolic murmur  No diastolic) present  No friction rub  No gallop  Pulmonary:      Breath sounds: Normal breath sounds  No wheezing, rhonchi or rales  Abdominal:      Palpations: Abdomen is soft  There is no hepatomegaly, splenomegaly or mass  Tenderness: There is no abdominal tenderness  Musculoskeletal:         General: No swelling  Discussion/Summary:  1  Paroxysmal atrial fibrillation  Patient on metoprolol XL 25 mg daily for potential rate control  On Xarelto 20 mg daily for stroke prophylaxis  In sinus rhythm today  2  Sinus bradycardia  Heart rates generally 50 and above  Having no symptoms to suggest profound bradycardia  3  Thoracic aortic aneurysm  Followed by CT surgery  Last measurement 43 mm   4  Hypertension  Excellent control on lisinopril 20 mg a day metoprolol XL 25 mg daily  5  Hyperlipidemia  Patient on pravastatin 10 mg daily  Recent LDL cholesterol 96 with HDL cholesterol 54  Excellent control considering no overt vascular disease        Fu 9 months      Jennifer Saucedo MD

## 2021-06-15 DIAGNOSIS — E03.9 HYPOTHYROIDISM, UNSPECIFIED TYPE: ICD-10-CM

## 2021-06-15 RX ORDER — LEVOTHYROXINE SODIUM 112 UG/1
112 TABLET ORAL
Qty: 30 TABLET | Refills: 3 | Status: SHIPPED | OUTPATIENT
Start: 2021-06-15 | End: 2021-06-28 | Stop reason: SDUPTHER

## 2021-06-15 NOTE — TELEPHONE ENCOUNTER
Patient called requesting refill for Levothyroxine 112 mcg sent to [facility]

## 2021-06-22 NOTE — TELEPHONE ENCOUNTER
Patient is asking if we can please send 90 day supply of the levothyroxine  to the [facility] mail order

## 2021-06-28 ENCOUNTER — OFFICE VISIT (OUTPATIENT)
Dept: FAMILY MEDICINE CLINIC | Facility: CLINIC | Age: 76
End: 2021-06-28
Payer: MEDICARE

## 2021-06-28 VITALS
DIASTOLIC BLOOD PRESSURE: 70 MMHG | HEART RATE: 61 BPM | SYSTOLIC BLOOD PRESSURE: 130 MMHG | HEIGHT: 69 IN | WEIGHT: 172.4 LBS | RESPIRATION RATE: 16 BRPM | TEMPERATURE: 97.6 F | OXYGEN SATURATION: 98 % | BODY MASS INDEX: 25.53 KG/M2

## 2021-06-28 DIAGNOSIS — E03.9 HYPOTHYROIDISM, UNSPECIFIED TYPE: ICD-10-CM

## 2021-06-28 DIAGNOSIS — R22.1 LOCALIZED SWELLING, MASS AND LUMP, NECK: ICD-10-CM

## 2021-06-28 DIAGNOSIS — I48.0 PAROXYSMAL ATRIAL FIBRILLATION (HCC): ICD-10-CM

## 2021-06-28 DIAGNOSIS — Z00.00 MEDICARE ANNUAL WELLNESS VISIT, SUBSEQUENT: ICD-10-CM

## 2021-06-28 DIAGNOSIS — E78.2 MIXED HYPERLIPIDEMIA: ICD-10-CM

## 2021-06-28 DIAGNOSIS — E89.0 POSTPROCEDURAL HYPOTHYROIDISM: ICD-10-CM

## 2021-06-28 DIAGNOSIS — F41.9 ANXIETY: ICD-10-CM

## 2021-06-28 DIAGNOSIS — Z00.00 HEALTH CARE MAINTENANCE: Primary | ICD-10-CM

## 2021-06-28 DIAGNOSIS — B35.1 ONYCHOMYCOSIS: ICD-10-CM

## 2021-06-28 DIAGNOSIS — I10 BENIGN ESSENTIAL HYPERTENSION: ICD-10-CM

## 2021-06-28 DIAGNOSIS — R73.03 PREDIABETES: ICD-10-CM

## 2021-06-28 PROCEDURE — 1123F ACP DISCUSS/DSCN MKR DOCD: CPT | Performed by: FAMILY MEDICINE

## 2021-06-28 PROCEDURE — G0439 PPPS, SUBSEQ VISIT: HCPCS | Performed by: FAMILY MEDICINE

## 2021-06-28 PROCEDURE — 99214 OFFICE O/P EST MOD 30 MIN: CPT | Performed by: FAMILY MEDICINE

## 2021-06-28 RX ORDER — LEVOTHYROXINE SODIUM 112 UG/1
112 TABLET ORAL
Qty: 30 TABLET | Refills: 3 | Status: SHIPPED | OUTPATIENT
Start: 2021-06-28 | End: 2021-09-08 | Stop reason: SDUPTHER

## 2021-06-28 NOTE — PROGRESS NOTES
Assessment and Plan:     Problem List Items Addressed This Visit     None           Preventive health issues were discussed with patient, and age appropriate screening tests were ordered as noted in patient's After Visit Summary  Personalized health advice and appropriate referrals for health education or preventive services given if needed, as noted in patient's After Visit Summary  History of Present Illness:     Patient presents for Welcome to Medicare visit       Patient Care Team:  Osmani Baum DO as PCP - Onelia Mcrae MD as PCP - Endocrinology (Endocrinology)  Nesha Hagen, MD Yaima Talbot MD Erline Parish, MD     Review of Systems:     Review of Systems   Problem List:     Patient Active Problem List   Diagnosis    Aneurysm[facility] ascending [facility]    Anxiety    Benign essential hypertension    Esophageal reflux    Hyperlipidemia    Paroxysmal atrial fibrillation (HCC)    Postprocedural hypothyroidism    Pulmonary nodules    Sick sinus syndrome ([facility]    Sinus bradycardia    Thyroid cancer ([facility]    Vitamin D deficiency    Pre-diabetes    Osteoporosis    Embolism and thrombosis of tibial vein, left ([facility] )    Hematospermia    Other pulmonary embolism without acute cor pulmonale ([facility] )    Traumatic rupture of quadriceps tendon      Past Medical and Surgical History:     Past Medical History:   Diagnosis Date    [facility] 11/2002    Anxiety 01/15/2002    [facility] 08/26/2016    Arthritis     neck    Atrial fibrillation ([facility] )     last assessed: 7/26/2017    Cancer ([facility] )     skin, thyroid     Disease of thyroid gland     Disorder of epididymis     H/O malignant neoplasm of thyroid     Hyperlipidemia     last assessed: 6/29/2017    Hypertension     [facility] leg D[facility] 03/07/2017    Osteopenia     Psychiatric disorder     Pulmonary embolism ([facility] )     last assessed: 7/26/2017     Past Surgical History: Procedure Laterality Date    APPENDECTOMY      COLONOSCOPY      complete    CYSTOSCOPY  02/01/2016    diagnostic    LEG SURGERY      SQUAMOUS CELL CARCINOMA EXCISION      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      Family History:     Family History   Problem Relation Age of Onset    Coronary artery disease Father     Heart attack Father         acute myocardial infarction    Stroke Mother     Hypertension Mother       Social History:     Social History     Socioeconomic History    Marital status: /Civil Union     Spouse name: Not on file    Number of children: Not on file    Years of education: Not on file    Highest education level: Not on file   Occupational History    Not on file   Tobacco Use    Smoking status: Former Smoker    Smokeless tobacco: Never Used    Tobacco comment: quit years ago   Vaping Use    Vaping Use: Never used   Substance and Sexual Activity    Alcohol use: No    Drug use: No    Sexual activity: Not on file   Other Topics Concern    Not on file   Social History Narrative    Not on file     Social Determinants of Health     Financial Resource Strain:     Difficulty of Paying Living Expenses:    Food Insecurity:     Worried About Running Out of Food in the Last Year:     [address] in the Last Year:    Transportation Needs:     Lack of Transportation (Medical):      Lack of Transportation (Non-Medical):    Physical Activity:     Days of Exercise per Week:     Minutes of Exercise per Session:    Stress:     Feeling of Stress :    Social Connections:     Frequency of Communication with Friends and Family:     Frequency of Social Gatherings with Friends and Family:     Attends [employer] Services:     Active Member of Clubs or Organizations:     Attends Club or Organization Meetings:     Marital Status:    Intimate Partner Violence:     Fear of Current or Ex-Partner:     Emotionally Abused:     Physically Abused:     Sexually Abused:       Medications and Allergies:     Current Outpatient Medications   Medication Sig Dispense Refill    ALPRAZolam (XANAX) 0 5 mg tablet Take 1 tablet (0 5 mg total) by mouth daily at bedtime as needed for anxiety 90 tablet 0    aspirin (ECOTRIN LOW STRENGTH) 81 mg EC tablet Take 81 mg by mouth daily      levothyroxine 112 mcg tablet Take 1 tablet (112 mcg total) by mouth daily in the early morning 30 tablet 3    lisinopril (ZESTRIL) 20 mg tablet Take 1 tablet (20 mg total) by mouth daily 90 tablet 5    metoprolol succinate (TOPROL-XL) 25 mg 24 hr tablet Take 1 tablet by mouth daily 90 tablet 3    pravastatin (PRAVACHOL) 10 mg tablet TAKE 1 TABLET DAILY 90 tablet 3    Xarelto 20 MG tablet TAKE 1 TABLET DAILY 90 tablet 3     No current facility-administered medications for this visit  Allergies   Allergen Reactions    Percocet [Oxycodone-Acetaminophen] Seizures    Penicillins      "nearly passed out"      Immunizations:     Immunization History   Administered Date(s) Administered    H1N1, All Formulations 01/08/2010    INFLUENZA 12/08/2004, 09/14/2018    Influenza Split High Dose Preservative Free IM 09/29/2015, 09/16/2016, 10/04/2017    Influenza, high dose seasonal 0 7 mL 09/19/2019, 09/24/2020    Pneumococcal 02/09/2011    Pneumococcal Conjugate 13-Valent 11/19/2018    Pneumococcal Conjugate PCV 7 02/09/2011    SARS-CoV-2 / COVID-19 mRNA IM ([facility]) 01/29/2021, 02/25/2021      Health Maintenance:         Topic Date Due    Hepatitis C Screening  Never done         Topic Date Due    Pneumococcal Vaccine: 65+ Years (2 of 2 - PPSV23) 11/19/2019      Medicare Screening Tests and Risk Assessments:     Ceasar Vasquez is here for his Subsequent Wellness visit  Health Risk Assessment:   Patient rates overall health as good  Patient feels that their physical health rating is same  Patient is satisfied with their life  Eyesight was rated as same  Hearing was rated as same   Patient feels that their emotional and mental health rating is same  Patients states they are never, rarely angry  Patient states they are never, rarely unusually tired/fatigued  Pain experienced in the last 7 days has been none  Patient states that he has experienced no weight loss or gain in last 6 months  Depression Screening:   PHQ-2 Score: 0      Fall Risk Screening: In the past year, patient has experienced: no history of falling in past year      Home Safety:  Patient does not have trouble with stairs inside or outside of their home  Patient has working smoke alarms and has working carbon monoxide detector  Home safety hazards include: none  Nutrition:   Current diet is Regular  Medications:   Patient is currently taking over-the-counter supplements  OTC medications include: see medication list  Patient is able to manage medications  Activities of Daily Living (ADLs)/Instrumental Activities of Daily Living (IADLs):   Walk and transfer into and out of bed and chair?: Yes  Dress and groom yourself?: Yes    Bathe or shower yourself?: Yes    Feed yourself? Yes  Do your laundry/housekeeping?: Yes  Manage your money, pay your bills and track your expenses?: Yes  Make your own meals?: Yes    Do your own shopping?: Yes    Previous Hospitalizations:   Any hospitalizations or ED visits within the last 12 months?: No      Advance Care Planning:   Living will: Yes    Durable POA for healthcare:  Yes    Advanced directive: Yes      PREVENTIVE SCREENINGS      Cardiovascular Screening:    General: Screening Not Indicated and History Lipid Disorder      Diabetes Screening:     General: Screening Current      Prostate Cancer Screening:    General: Screening Not Indicated      Osteoporosis Screening:    General: Screening Not Indicated and History Osteoporosis      Abdominal Aortic Aneurysm (AAA) Screening:    Risk factors include: tobacco use        Lung Cancer Screening:     General: Screening Not Indicated    Screening, Brief Intervention, and Referral to Treatment (SBIRT)    Screening  Typical number of drinks in a day: 0  Typical number of drinks in a week: 0  Interpretation: Low risk drinking behavior  Single Item Drug Screening:  How often have you used an illegal drug (including marijuana) or a prescription medication for non-medical reasons in the past year? never    Single Item Drug Screen Score: 0  Interpretation: Negative screen for possible drug use disorder    No exam data present     Physical Exam:     There were no vitals taken for this visit      Physical Exam     Hima Aguiar DO

## 2021-06-28 NOTE — PATIENT INSTRUCTIONS
Medicare wellness done today and home is safe and adl's are stable  Take all meds as directed for HTN and hypothyroidism and if tsh and t4 free stable on new thyroid dose will send for 90 day supply and get tsh and t4 free as directed 8 weeks from last start of new dose  F-up with cardiology for a-fib and continue Xarelto  Low sugar diet encouraged for prediabetes and will monitor periodically every year  Rec taking meds as directed for anxiety and HTN and hyperlipidemia  Lipids stable  Lump in right cheek, consult ENT  Trim down left great toenail as directed, consider Podiatry if needed  Lotrimin prn

## 2021-06-28 NOTE — PROGRESS NOTES
Assessment/Plan:  Chief Complaint   Patient presents with   [facility] Wellness Visit     Patient Instructions   Medicare wellness done today and home is safe and adl's are stable  Take all meds as directed for HTN and hypothyroidism and if tsh and t4 free stable on new thyroid dose will send for 90 day supply and get tsh and t4 free as directed 8 weeks from last start of new dose  F-up with cardiology for a-fib and continue Xarelto  Low sugar diet encouraged for prediabetes and will monitor periodically every year  Rec taking meds as directed for anxiety and HTN and hyperlipidemia  Lipids stable  Lump in right cheek, consult ENT  Trim down left great toenail as directed, consider Podiatry if needed  Lotrimin prn  No problem-specific Assessment & Plan notes found for this encounter  Diagnoses and all orders for this visit:    Health care maintenance    Medicare annual wellness visit, subsequent    Postprocedural hypothyroidism  -     TSH, 3rd generation; Future  -     T4, free    Mixed hyperlipidemia    Benign essential hypertension    Prediabetes    Anxiety    Paroxysmal atrial fibrillation (HCC)    Localized swelling, mass and lump, neck  -     Ambulatory Referral to Otolaryngology; Future    Onychomycosis    Hypothyroidism, unspecified type  -     levothyroxine 112 mcg tablet; Take 1 tablet (112 mcg total) by mouth daily in the early morning          Subjective:      Patient ID: Edie Bauer  is a 66 y o  male  Here for awv, home is safe and does adl's and also smoke detectors are safe  Here for recheck on BP and also routine medical office visit  Sees Cardiology as directed  The following portions of the patient's history were reviewed and updated as appropriate: allergies, current medications, past family history, past medical history, past social history, past surgical history and problem list     Review of Systems   Constitutional: Negative  HENT: Negative  Eyes: Negative  Respiratory: Negative  Cardiovascular: Negative  Gastrointestinal: Negative  Endocrine: Negative  Genitourinary: Negative  Musculoskeletal: Negative  Skin: Negative  Allergic/Immunologic: Negative  Neurological: Negative  Hematological: Negative  Psychiatric/Behavioral: Negative  Objective:      /70   Pulse 61   Temp 97 6 °F (36 4 °C) (Temporal)   Resp 16   Ht 5' 9" (1 753 m)   Wt 78 2 kg (172 lb 6 4 oz)   SpO2 98%   BMI 25 46 kg/m²          Physical Exam  Constitutional:       Appearance: He is well-developed  HENT:      Head: Normocephalic and atraumatic  Right Ear: External ear normal       Left Ear: External ear normal       Nose: Nose normal    Eyes:      Conjunctiva/sclera: Conjunctivae normal       Pupils: Pupils are equal, round, and reactive to light  Cardiovascular:      Rate and Rhythm: Normal rate and regular rhythm  Heart sounds: Normal heart sounds  Pulmonary:      Effort: Pulmonary effort is normal       Breath sounds: Normal breath sounds  Musculoskeletal:         General: Normal range of motion  Cervical back: Normal range of motion and neck supple  Skin:     General: Skin is warm and dry  Neurological:      Mental Status: He is alert and oriented to person, place, and time  Deep Tendon Reflexes: Reflexes are normal and symmetric     Psychiatric:         Behavior: Behavior normal

## 2021-07-23 ENCOUNTER — LAB (OUTPATIENT)
Dept: LAB | Facility: HOSPITAL | Age: 76
End: 2021-07-23
Payer: MEDICARE

## 2021-07-23 DIAGNOSIS — E80.6 HYPERBILIRUBINEMIA: ICD-10-CM

## 2021-07-23 LAB
BILIRUB DIRECT SERPL-MCNC: 0.23 MG/DL (ref 0–0.2)
BILIRUB SERPL-MCNC: 1.06 MG/DL (ref 0.2–1)

## 2021-07-23 PROCEDURE — 36415 COLL VENOUS BLD VENIPUNCTURE: CPT

## 2021-07-23 PROCEDURE — 82248 BILIRUBIN DIRECT: CPT

## 2021-07-23 PROCEDURE — 82247 BILIRUBIN TOTAL: CPT

## 2021-07-30 ENCOUNTER — APPOINTMENT (OUTPATIENT)
Dept: LAB | Facility: HOSPITAL | Age: 76
End: 2021-07-30
Payer: MEDICARE

## 2021-07-30 DIAGNOSIS — E89.0 POSTPROCEDURAL HYPOTHYROIDISM: ICD-10-CM

## 2021-07-30 LAB
T4 FREE SERPL-MCNC: 1.09 NG/DL (ref 0.76–1.46)
TSH SERPL DL<=0.05 MIU/L-ACNC: 0.52 UIU/ML (ref 0.36–3.74)

## 2021-07-30 PROCEDURE — 84443 ASSAY THYROID STIM HORMONE: CPT

## 2021-07-30 PROCEDURE — 84439 ASSAY OF FREE THYROXINE: CPT | Performed by: FAMILY MEDICINE

## 2021-07-30 PROCEDURE — 36415 COLL VENOUS BLD VENIPUNCTURE: CPT | Performed by: FAMILY MEDICINE

## 2021-08-12 ENCOUNTER — TELEPHONE (OUTPATIENT)
Dept: FAMILY MEDICINE CLINIC | Facility: CLINIC | Age: 76
End: 2021-08-12

## 2021-08-12 NOTE — TELEPHONE ENCOUNTER
Nick Valladares called the office this am regarding they faxed an anticoag 3 day hold request for Young Crespo to address and send back to them on [address]  It was faxed to our office on 07/29/21  It has not yet been received and the date  of procedure is 08/17/21  I informed Nick Valladares it was faxed back  I will re fax anticoag hold request back to [facility] as requested  to [phone]

## 2021-09-08 DIAGNOSIS — E03.9 HYPOTHYROIDISM, UNSPECIFIED TYPE: ICD-10-CM

## 2021-09-08 RX ORDER — LEVOTHYROXINE SODIUM 112 UG/1
112 TABLET ORAL
Qty: 30 TABLET | Refills: 3 | Status: SHIPPED | OUTPATIENT
Start: 2021-09-08 | End: 2021-09-14 | Stop reason: SDUPTHER

## 2021-09-14 DIAGNOSIS — E03.9 HYPOTHYROIDISM, UNSPECIFIED TYPE: ICD-10-CM

## 2021-09-14 RX ORDER — LEVOTHYROXINE SODIUM 112 UG/1
112 TABLET ORAL
Qty: 90 TABLET | Refills: 1 | Status: SHIPPED | OUTPATIENT
Start: 2021-09-14 | End: 2022-03-14

## 2021-10-10 DIAGNOSIS — I10 HYPERTENSION, UNSPECIFIED TYPE: ICD-10-CM

## 2021-10-11 RX ORDER — METOPROLOL SUCCINATE 25 MG/1
TABLET, EXTENDED RELEASE ORAL
Qty: 90 TABLET | Refills: 3 | Status: SHIPPED | OUTPATIENT
Start: 2021-10-11

## 2021-10-27 RX ORDER — SOD SULF/POT CHLORIDE/MAG SULF 1.479 G
TABLET ORAL
COMMUNITY
Start: 2021-07-22 | End: 2022-04-11

## 2021-10-28 ENCOUNTER — OFFICE VISIT (OUTPATIENT)
Dept: FAMILY MEDICINE CLINIC | Facility: CLINIC | Age: 76
End: 2021-10-28
Payer: MEDICARE

## 2021-10-28 VITALS
BODY MASS INDEX: 25.45 KG/M2 | OXYGEN SATURATION: 98 % | HEIGHT: 69 IN | RESPIRATION RATE: 16 BRPM | TEMPERATURE: 97.6 F | SYSTOLIC BLOOD PRESSURE: 142 MMHG | HEART RATE: 49 BPM | WEIGHT: 171.8 LBS | DIASTOLIC BLOOD PRESSURE: 84 MMHG

## 2021-10-28 DIAGNOSIS — E78.2 MIXED HYPERLIPIDEMIA: ICD-10-CM

## 2021-10-28 DIAGNOSIS — R73.03 PRE-DIABETES: ICD-10-CM

## 2021-10-28 DIAGNOSIS — I10 BENIGN ESSENTIAL HYPERTENSION: Primary | ICD-10-CM

## 2021-10-28 DIAGNOSIS — F41.9 ANXIETY: ICD-10-CM

## 2021-10-28 DIAGNOSIS — E89.0 POSTPROCEDURAL HYPOTHYROIDISM: ICD-10-CM

## 2021-10-28 DIAGNOSIS — I48.0 PAROXYSMAL ATRIAL FIBRILLATION (HCC): ICD-10-CM

## 2021-10-28 PROCEDURE — 99214 OFFICE O/P EST MOD 30 MIN: CPT | Performed by: FAMILY MEDICINE

## 2021-11-16 DIAGNOSIS — I26.99 OTHER PULMONARY EMBOLISM WITHOUT ACUTE COR PULMONALE, UNSPECIFIED CHRONICITY (HCC): ICD-10-CM

## 2021-11-16 RX ORDER — RIVAROXABAN 20 MG/1
TABLET, FILM COATED ORAL
Qty: 90 TABLET | Refills: 3 | Status: SHIPPED | OUTPATIENT
Start: 2021-11-16

## 2022-02-07 DIAGNOSIS — I10 BENIGN ESSENTIAL HYPERTENSION: ICD-10-CM

## 2022-02-07 RX ORDER — LISINOPRIL 20 MG/1
TABLET ORAL
Qty: 90 TABLET | Refills: 3 | Status: SHIPPED | OUTPATIENT
Start: 2022-02-07 | End: 2022-03-08 | Stop reason: SDUPTHER

## 2022-02-28 ENCOUNTER — APPOINTMENT (OUTPATIENT)
Dept: LAB | Facility: CLINIC | Age: 77
End: 2022-02-28
Payer: MEDICARE

## 2022-02-28 DIAGNOSIS — R73.03 PRE-DIABETES: ICD-10-CM

## 2022-02-28 DIAGNOSIS — I10 BENIGN ESSENTIAL HYPERTENSION: ICD-10-CM

## 2022-02-28 DIAGNOSIS — E89.0 POSTPROCEDURAL HYPOTHYROIDISM: ICD-10-CM

## 2022-02-28 LAB
ALBUMIN SERPL BCP-MCNC: 4.2 G/DL (ref 3.5–5)
ALP SERPL-CCNC: 62 U/L (ref 46–116)
ALT SERPL W P-5'-P-CCNC: 29 U/L (ref 12–78)
ANION GAP SERPL CALCULATED.3IONS-SCNC: 6 MMOL/L (ref 4–13)
AST SERPL W P-5'-P-CCNC: 5 U/L (ref 5–45)
BILIRUB SERPL-MCNC: 1.69 MG/DL (ref 0.2–1)
BUN SERPL-MCNC: 20 MG/DL (ref 5–25)
CALCIUM SERPL-MCNC: 9.3 MG/DL (ref 8.3–10.1)
CHLORIDE SERPL-SCNC: 103 MMOL/L (ref 100–108)
CO2 SERPL-SCNC: 26 MMOL/L (ref 21–32)
CREAT SERPL-MCNC: 1.03 MG/DL (ref 0.6–1.3)
EST. AVERAGE GLUCOSE BLD GHB EST-MCNC: 114 MG/DL
GFR SERPL CREATININE-BSD FRML MDRD: 69 ML/MIN/1.73SQ M
GLUCOSE P FAST SERPL-MCNC: 98 MG/DL (ref 65–99)
HBA1C MFR BLD: 5.6 %
POTASSIUM SERPL-SCNC: 4.8 MMOL/L (ref 3.5–5.3)
PROT SERPL-MCNC: 7.3 G/DL (ref 6.4–8.2)
SODIUM SERPL-SCNC: 135 MMOL/L (ref 136–145)
T4 FREE SERPL-MCNC: 1.23 NG/DL (ref 0.76–1.46)
TSH SERPL DL<=0.05 MIU/L-ACNC: 0.96 UIU/ML (ref 0.36–3.74)

## 2022-02-28 PROCEDURE — 84439 ASSAY OF FREE THYROXINE: CPT | Performed by: FAMILY MEDICINE

## 2022-02-28 PROCEDURE — 36415 COLL VENOUS BLD VENIPUNCTURE: CPT

## 2022-02-28 PROCEDURE — 84443 ASSAY THYROID STIM HORMONE: CPT

## 2022-02-28 PROCEDURE — 83036 HEMOGLOBIN GLYCOSYLATED A1C: CPT | Performed by: FAMILY MEDICINE

## 2022-02-28 PROCEDURE — 80053 COMPREHEN METABOLIC PANEL: CPT

## 2022-03-08 ENCOUNTER — OFFICE VISIT (OUTPATIENT)
Dept: CARDIOLOGY CLINIC | Facility: CLINIC | Age: 77
End: 2022-03-08
Payer: MEDICARE

## 2022-03-08 VITALS
WEIGHT: 172 LBS | HEIGHT: 69 IN | BODY MASS INDEX: 25.48 KG/M2 | HEART RATE: 43 BPM | DIASTOLIC BLOOD PRESSURE: 80 MMHG | SYSTOLIC BLOOD PRESSURE: 130 MMHG

## 2022-03-08 DIAGNOSIS — I71.2 ANEURYSM, ASCENDING AORTA (HCC): ICD-10-CM

## 2022-03-08 DIAGNOSIS — I48.0 PAROXYSMAL ATRIAL FIBRILLATION (HCC): Primary | ICD-10-CM

## 2022-03-08 DIAGNOSIS — I10 BENIGN ESSENTIAL HYPERTENSION: ICD-10-CM

## 2022-03-08 DIAGNOSIS — R00.1 SINUS BRADYCARDIA: ICD-10-CM

## 2022-03-08 DIAGNOSIS — E78.2 MIXED HYPERLIPIDEMIA: ICD-10-CM

## 2022-03-08 PROCEDURE — 93000 ELECTROCARDIOGRAM COMPLETE: CPT | Performed by: INTERNAL MEDICINE

## 2022-03-08 PROCEDURE — 99214 OFFICE O/P EST MOD 30 MIN: CPT | Performed by: INTERNAL MEDICINE

## 2022-03-08 RX ORDER — LISINOPRIL 20 MG/1
20 TABLET ORAL SEE ADMIN INSTRUCTIONS
Qty: 120 TABLET | Refills: 3 | Status: SHIPPED | OUTPATIENT
Start: 2022-03-08

## 2022-03-08 NOTE — PROGRESS NOTES
Cardiology Follow Up    Kori Melendrez   1945  [id number]  [facility] CARDIOLOGY [place]  [address] [place] [id number] [phone]    Reason for visit: 9 month follow-up for paroxysmal atrial fibrillation, sick sinus syndrome with sinus bradycardia, hypertension, hyperlipidemia, moderate aortic root enlargement for which he follows with CT surgery and history of DVT      1  Paroxysmal atrial fibrillation (HCC)  POCT ECG   2  Sinus bradycardia     3  Aneurysm, ascending aorta ([facility] )     4  Benign essential hypertension     5  Mixed hyperlipidemia         Interval History:  Since the patient's last visit he has done well  He denies chest pain, shortness of breath, palpitations, edema or lightheadedness  He has had some intermittent high blood pressure readings but is quite anxious about his wife's illness      Patient Active Problem List   Diagnosis    Aneurysm, ascending aorta (HCC)    Anxiety    Benign essential hypertension    Esophageal reflux    Hyperlipidemia    Paroxysmal atrial fibrillation (HCC)    Postprocedural hypothyroidism    Pulmonary nodules    Sick sinus syndrome (HCC)    [facility]    Vitamin D deficiency    Pre-diabetes    Osteoporosis    Embolism and thrombosis of tibial vein, left (HCC)    Hematospermia    Other pulmonary embolism without acute cor pulmonale (HCC)    Traumatic rupture of quadriceps tendon     Past Medical History:   Diagnosis Date    A[facility]fib [facility] ) 11/2002    Anxiety 01/15/2002    [facility] 08/26/2016    Arthritis     neck    Atrial fibrillation ([facility] )     last assessed: 7/26/2017    Cancer (HCC)     skin, thyroid     Disease of thyroid gland     Disorder of epididymis     H/O malignant neoplasm of thyroid     Hyperlipidemia     last assessed: 6/29/2017    Hypertension     [facility] 03/07/2017    Osteopenia     Psychiatric disorder     Pulmonary embolism [facility]     last assessed: 2017     Social History     Socioeconomic History    Marital status: /Civil Union     Spouse name: Not on file    Number of children: Not on file    Years of education: Not on file    Highest education level: Not on file   Occupational History    Not on file   Tobacco Use    Smoking status: Former Smoker     Quit date: 1967     Years since quittin 3    Smokeless tobacco: Never Used   Vaping Use    Vaping Use: Never used   Substance and Sexual Activity    Alcohol use: No    Drug use: No    Sexual activity: Not on file   Other Topics Concern    Not on file   Social History Narrative    Does not consume caffeine     Social Determinants of Health     Financial Resource Strain: Not on file   Food Insecurity: Not on file   Transportation Needs: Not on file   Physical Activity: Not on file   Stress: Not on file   Social Connections: Not on file   Intimate Partner Violence: Not on file   Housing Stability: Not on file      Family History   Problem Relation Age of Onset    Coronary artery disease Father     Heart attack Father         acute myocardial infarction    Stroke Mother     Hypertension Mother      Past Surgical History:   Procedure Laterality Date    APPENDECTOMY      BASAL CELL CARCINOMA EXCISION      neck    COLONOSCOPY      complete    CYSTOSCOPY  2016    diagnostic    LEG SURGERY Left     quadricep    SQUAMOUS CELL CARCINOMA EXCISION      chest area, right leg    TOTAL THYROIDECTOMY  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       Current Outpatient Medications:     ALPRAZolam (XANAX) 0 5 mg tablet, Take 1 tablet (0 5 mg total) by mouth daily at bedtime as needed for anxiety, Disp: 90 tablet, Rfl: 0    aspirin (ECOTRIN LOW STRENGTH) 81 mg EC tablet, Take 81 mg by mouth daily, Disp: , Rfl:     levothyroxine 112 mcg tablet, Take 1 tablet (112 mcg total) by mouth daily in the early morning, Disp: 90 tablet, Rfl: 1    lisinopril (ZESTRIL) 20 mg tablet, TAKE 1 TABLET DAILY, Disp: 90 tablet, Rfl: 3    metoprolol succinate (TOPROL-XL) 25 mg 24 hr tablet, TAKE 1 TABLET DAILY, Disp: 90 tablet, Rfl: 3    pravastatin (PRAVACHOL) 10 mg tablet, TAKE 1 TABLET DAILY, Disp: 90 tablet, Rfl: 3    Xarelto 20 MG tablet, TAKE 1 TABLET DAILY, Disp: 90 tablet, Rfl: 3    Sutab 3247-289-016 MG TABS, TAKE 1 (ONE) KIT BY MOUTH AS DIRECTED (Patient not taking: Reported on 10/28/2021), Disp: , Rfl:   Allergies   Allergen Reactions    Percocet [Oxycodone-Acetaminophen] Seizures    Penicillins      "nearly passed out"       Review of Systems:  Review of Systems   Constitutional: Negative for activity change, appetite change, fatigue and unexpected weight change  Respiratory: Negative for cough, chest tightness, shortness of breath and wheezing  Cardiovascular: Negative for chest pain, palpitations and leg swelling  Gastrointestinal: Negative for abdominal pain, blood in stool, constipation and diarrhea  Genitourinary: Negative for dysuria, frequency, hematuria and urgency  Musculoskeletal: Negative for arthralgias, back pain and gait problem  Neurological: Negative for dizziness, speech difficulty, light-headedness, numbness and headaches  Psychiatric/Behavioral: Negative for agitation, behavioral problems, confusion and decreased concentration  The patient is nervous/anxious  Physical Exam:  Vitals:    03/08/22 0808   BP: 130/80   BP Location: Left arm   Patient Position: Sitting   Cuff Size: Adult   Pulse: (!) 43   Weight: 78 kg (172 lb)   Height: 5' 9" (1 753 m)       Physical Exam  Constitutional:       General: He is not in acute distress  Appearance: He is normal weight  He is not ill-appearing  HENT:      Head: Normocephalic and atraumatic  Mouth/Throat:      Mouth: Mucous membranes are moist       Pharynx: No oropharyngeal exudate or posterior oropharyngeal erythema  Eyes:      General: No scleral icterus  Conjunctiva/sclera: Conjunctivae normal    Cardiovascular:      Rate and Rhythm: Regular rhythm  Bradycardia present  Pulses: Normal pulses  Heart sounds: Murmur heard  No friction rub  No gallop  Neurological:      Mental Status: He is alert  Discussion/Summary:  1  PAF-in sinus rhythm today  On metoprolol XL 25 mg for potential rate control  On Xarelto 20 mg daily for stroke prevention  2  Sinus bradycardia  Heart rate today 43 beats per minute  His readings at home have generally exceeded 50 beats per minute  He is having no dizziness but will report this if he gets this  Note also has mild first-degree AV block  3  Thoracic aortic aneurysm  Being followed by CT surgery  Measured 4 3 on CT scan last March  4  Hypertension  Patient on lisinopril 20 mg in the a m  And metoprolol XL 25 mg daily  Does have intermittent hypertension although this is fairly uncommon  Did recommend that if systolic blood pressure exceeds 140 in the evening when he checks his blood pressure he can take an additional 20 mg of lisinopril  I will write the prescription to allow for additional dosing  5  Hyperlipidemia  Patient on pravastatin 10 mg daily  LDL cholesterol 96 with HDL cholesterol 54 and triglycerides of 25   Considering he has no carotid disease, peripheral vascular disease or known coronary disease I feel this is adequate control    Follow-up 9 months    Jeanne Dumont MD

## 2022-03-09 ENCOUNTER — OFFICE VISIT (OUTPATIENT)
Dept: FAMILY MEDICINE CLINIC | Facility: CLINIC | Age: 77
End: 2022-03-09
Payer: MEDICARE

## 2022-03-09 VITALS
HEART RATE: 56 BPM | SYSTOLIC BLOOD PRESSURE: 142 MMHG | HEIGHT: 65 IN | WEIGHT: 173.2 LBS | DIASTOLIC BLOOD PRESSURE: 74 MMHG | TEMPERATURE: 97.3 F | BODY MASS INDEX: 28.86 KG/M2 | OXYGEN SATURATION: 98 %

## 2022-03-09 DIAGNOSIS — I48.0 PAROXYSMAL ATRIAL FIBRILLATION (HCC): ICD-10-CM

## 2022-03-09 DIAGNOSIS — R91.8 PULMONARY NODULES: ICD-10-CM

## 2022-03-09 DIAGNOSIS — I82.442: ICD-10-CM

## 2022-03-09 DIAGNOSIS — H26.9 CATARACT OF RIGHT EYE, UNSPECIFIED CATARACT TYPE: ICD-10-CM

## 2022-03-09 DIAGNOSIS — M81.0 OSTEOPOROSIS, UNSPECIFIED OSTEOPOROSIS TYPE, UNSPECIFIED PATHOLOGICAL FRACTURE PRESENCE: ICD-10-CM

## 2022-03-09 DIAGNOSIS — E55.9 VITAMIN D DEFICIENCY: ICD-10-CM

## 2022-03-09 DIAGNOSIS — Z01.818 PREOP EXAMINATION: Primary | ICD-10-CM

## 2022-03-09 DIAGNOSIS — F41.9 ANXIETY: ICD-10-CM

## 2022-03-09 DIAGNOSIS — E89.0 POSTPROCEDURAL HYPOTHYROIDISM: ICD-10-CM

## 2022-03-09 DIAGNOSIS — K21.9 GASTROESOPHAGEAL REFLUX DISEASE, UNSPECIFIED WHETHER ESOPHAGITIS PRESENT: ICD-10-CM

## 2022-03-09 DIAGNOSIS — R73.03 PRE-DIABETES: ICD-10-CM

## 2022-03-09 DIAGNOSIS — E78.2 MIXED HYPERLIPIDEMIA: ICD-10-CM

## 2022-03-09 DIAGNOSIS — C73 THYROID CANCER (HCC): ICD-10-CM

## 2022-03-09 DIAGNOSIS — I10 BENIGN ESSENTIAL HYPERTENSION: ICD-10-CM

## 2022-03-09 DIAGNOSIS — I26.99 OTHER PULMONARY EMBOLISM WITHOUT ACUTE COR PULMONALE, UNSPECIFIED CHRONICITY (HCC): ICD-10-CM

## 2022-03-09 PROCEDURE — 99214 OFFICE O/P EST MOD 30 MIN: CPT | Performed by: FAMILY MEDICINE

## 2022-03-09 RX ORDER — ALPRAZOLAM 0.5 MG/1
0.5 TABLET ORAL
Qty: 90 TABLET | Refills: 0 | Status: SHIPPED | OUTPATIENT
Start: 2022-03-09

## 2022-03-09 NOTE — PATIENT INSTRUCTIONS
Patient is medically cleared for right eye cataract surgery on 3/23/22 by Dr Jhoana Coyle  Labs also reviewed  Thyroid labs stable  Prediabetes now stable with HGA1C at 5 6

## 2022-03-09 NOTE — PROGRESS NOTES
BMI Counseling: Body mass index is 28 82 kg/m²  The BMI is above normal  Nutrition recommendations include reducing portion sizes, decreasing overall calorie intake, 3-5 servings of fruits/vegetables daily, reducing fast food intake and consuming healthier snacks  Exercise recommendations include exercising 3-5 times per week

## 2022-03-09 NOTE — PROGRESS NOTES
Assessment/Plan:  Chief Complaint   Patient presents with    Pre-op Exam     Pre op clearance for right cataract on 03/23/2022 by Dr Jesús Sanchez at [facility]  No PAT's required  Patient Instructions   Patient is medically cleared for right eye cataract surgery on 3/23/22 by Dr Jesús Sanchez  Labs also reviewed  Thyroid labs stable  Prediabetes now stable with HGA1C at 5 6  No problem-specific Assessment & Plan notes found for this encounter  Diagnoses and all orders for this visit:    Preop examination    Anxiety    Vitamin D deficiency    Pre-diabetes    Mixed hyperlipidemia    Benign essential hypertension    Embolism and thrombosis of tibial vein, left (HCC)    Gastroesophageal reflux disease, unspecified whether esophagitis present    Other pulmonary embolism without acute cor pulmonale, unspecified chronicity (HCC)    Osteoporosis, unspecified osteoporosis type, unspecified pathological fracture presence    Paroxysmal atrial fibrillation (HCC)    Postprocedural hypothyroidism    Pulmonary nodules    Thyroid cancer ([facility] [address] )    Cataract of right eye, unspecified cataract type          Subjective:      Patient ID: Jaime Sharif  is a 64 y o  male  Here for preop med clearance and is taking all meds as directed for right eye cataract surgery by Dr Jesús Sanchez on 3/23/22  The following portions of the patient's history were reviewed and updated as appropriate: allergies, current medications, past family history, past medical history, past social history, past surgical history and problem list     Review of Systems   Constitutional: Negative  HENT: Negative  Eyes: Negative  Respiratory: Negative  Cardiovascular: Negative  Gastrointestinal: Negative  Endocrine: Negative  Genitourinary: Negative  Musculoskeletal: Negative  Skin: Negative  Allergic/Immunologic: Negative  Neurological: Negative  Hematological: Negative      Psychiatric/Behavioral: Negative  Objective:      /74 (BP Location: Left arm, Patient Position: Sitting, Cuff Size: Adult)   Pulse 56   Temp (!) 97 3 °F (36 3 °C) (Temporal)   Ht 5' 5" (1 651 m)   Wt 78 6 kg (173 lb 3 2 oz)   SpO2 98%   BMI 28 82 kg/m²          Physical Exam  Constitutional:       Appearance: He is well-developed  HENT:      Head: Normocephalic and atraumatic  Right Ear: External ear normal       Left Ear: External ear normal    Eyes:      Conjunctiva/sclera: Conjunctivae normal       Pupils: Pupils are equal, round, and reactive to light  Cardiovascular:      Rate and Rhythm: Normal rate and regular rhythm  Heart sounds: Normal heart sounds  Pulmonary:      Effort: Pulmonary effort is normal       Breath sounds: Normal breath sounds  Abdominal:      General: Abdomen is flat  Bowel sounds are normal       Palpations: Abdomen is soft  Musculoskeletal:         General: Normal range of motion  Cervical back: Normal range of motion and neck supple  Skin:     General: Skin is warm and dry  Neurological:      Mental Status: He is alert and oriented to person, place, and time  Deep Tendon Reflexes: Reflexes are normal and symmetric     Psychiatric:         Behavior: Behavior normal

## 2022-03-14 DIAGNOSIS — E03.9 HYPOTHYROIDISM, UNSPECIFIED TYPE: ICD-10-CM

## 2022-03-14 RX ORDER — LEVOTHYROXINE SODIUM 112 UG/1
TABLET ORAL
Qty: 90 TABLET | Refills: 3 | Status: SHIPPED | OUTPATIENT
Start: 2022-03-14

## 2022-04-11 ENCOUNTER — OFFICE VISIT (OUTPATIENT)
Dept: FAMILY MEDICINE CLINIC | Facility: CLINIC | Age: 77
End: 2022-04-11
Payer: MEDICARE

## 2022-04-11 VITALS
WEIGHT: 174.4 LBS | TEMPERATURE: 97.6 F | DIASTOLIC BLOOD PRESSURE: 82 MMHG | BODY MASS INDEX: 25.83 KG/M2 | OXYGEN SATURATION: 99 % | HEART RATE: 68 BPM | SYSTOLIC BLOOD PRESSURE: 138 MMHG | RESPIRATION RATE: 16 BRPM | HEIGHT: 69 IN

## 2022-04-11 DIAGNOSIS — R73.03 PRE-DIABETES: ICD-10-CM

## 2022-04-11 DIAGNOSIS — E89.0 POSTPROCEDURAL HYPOTHYROIDISM: ICD-10-CM

## 2022-04-11 DIAGNOSIS — E55.9 VITAMIN D DEFICIENCY: ICD-10-CM

## 2022-04-11 DIAGNOSIS — C73 THYROID CANCER (HCC): ICD-10-CM

## 2022-04-11 DIAGNOSIS — R91.8 PULMONARY NODULES: ICD-10-CM

## 2022-04-11 DIAGNOSIS — I10 BENIGN ESSENTIAL HYPERTENSION: ICD-10-CM

## 2022-04-11 DIAGNOSIS — I26.99 OTHER PULMONARY EMBOLISM WITHOUT ACUTE COR PULMONALE, UNSPECIFIED CHRONICITY (HCC): ICD-10-CM

## 2022-04-11 DIAGNOSIS — Z01.818 PREOP EXAMINATION: Primary | ICD-10-CM

## 2022-04-11 DIAGNOSIS — M81.0 OSTEOPOROSIS, UNSPECIFIED OSTEOPOROSIS TYPE, UNSPECIFIED PATHOLOGICAL FRACTURE PRESENCE: ICD-10-CM

## 2022-04-11 DIAGNOSIS — F41.9 ANXIETY: ICD-10-CM

## 2022-04-11 DIAGNOSIS — E78.2 MIXED HYPERLIPIDEMIA: ICD-10-CM

## 2022-04-11 DIAGNOSIS — H26.9 CATARACT OF LEFT EYE, UNSPECIFIED CATARACT TYPE: ICD-10-CM

## 2022-04-11 DIAGNOSIS — I48.0 PAROXYSMAL ATRIAL FIBRILLATION (HCC): ICD-10-CM

## 2022-04-11 PROCEDURE — 99214 OFFICE O/P EST MOD 30 MIN: CPT | Performed by: FAMILY MEDICINE

## 2022-04-11 RX ORDER — NEPAFENAC 0.3 %
SUSPENSION, DROPS(FINAL DOSAGE FORM)(ML) OPHTHALMIC (EYE)
COMMUNITY
Start: 2022-04-05 | End: 2022-08-09

## 2022-04-11 NOTE — PATIENT INSTRUCTIONS
Here for preop and is  medically cleared for left eye cataract surgery on 4/13/22 by Dr Diane Ohara  Labs also reviewed  Thyroid labs stable  Prediabetes is stable with HGA1C at 5 6 on last labs

## 2022-04-11 NOTE — PROGRESS NOTES
Assessment/Plan:  Chief Complaint   Patient presents with    Pre-op Exam     pre op clearance, cataracs removal, being done by dr Shelli Connor at [facility]     Patient Instructions   Here for preop and is  medically cleared for left eye cataract surgery on 4/13/22 by Dr Cony Soto  Labs also reviewed  Thyroid labs stable  Prediabetes is stable with HGA1C at 5 6 on last labs  No problem-specific Assessment & Plan notes found for this encounter  Diagnoses and all orders for this visit:    Preop examination    Anxiety    Vitamin D deficiency    Pre-diabetes    Mixed hyperlipidemia    Benign essential hypertension    Other pulmonary embolism without acute cor pulmonale, unspecified chronicity (HCC)    Osteoporosis, unspecified osteoporosis type, unspecified pathological fracture presence    Paroxysmal atrial fibrillation (HCC)    Postprocedural hypothyroidism    Pulmonary nodules    Thyroid cancer (HCC)    Cataract of left eye, unspecified cataract type    Other orders  -     Ilevro 0 3 % SUSP; INSTILL 1 DROP INTO THE LEFT EYE ONCE A DAY STARTING THE DAY YOU GET HOME FROM SURGERY FOR 28 DAYS          Subjective:      Patient ID: Izaiah Fagan  is a 64 y o  male  Here for preop cataract surgery left eye and had right eye cataract surgery 3/23/22 by Dr Cony Soto  The following portions of the patient's history were reviewed and updated as appropriate: allergies, current medications, past family history, past medical history, past social history, past surgical history and problem list     Review of Systems   Constitutional: Negative  HENT: Negative  Eyes:        Cataract left eye   Respiratory: Negative  Cardiovascular: Negative  Gastrointestinal: Negative  Endocrine: Negative  Genitourinary: Negative  Musculoskeletal: Negative  Skin: Negative  Allergic/Immunologic: Negative  Neurological: Negative  Hematological: Negative  Psychiatric/Behavioral: Negative  Objective:      /82 (BP Location: Left arm, Patient Position: Sitting, Cuff Size: Adult)   Pulse 68   Temp 97 6 °F (36 4 °C) (Temporal)   Resp 16   Ht 5' 9" (1 753 m)   Wt 79 1 kg (174 lb 6 4 oz)   SpO2 99%   BMI 25 75 kg/m²          Physical Exam  Constitutional:       Appearance: He is well-developed  Comments: overweight   HENT:      Head: Normocephalic and atraumatic  Right Ear: External ear normal       Left Ear: External ear normal       Nose: Nose normal    Eyes:      Conjunctiva/sclera: Conjunctivae normal       Pupils: Pupils are equal, round, and reactive to light  Cardiovascular:      Rate and Rhythm: Normal rate and regular rhythm  Heart sounds: Normal heart sounds  Pulmonary:      Effort: Pulmonary effort is normal       Breath sounds: Normal breath sounds  Abdominal:      General: Abdomen is flat  Bowel sounds are normal       Palpations: Abdomen is soft  Musculoskeletal:         General: Normal range of motion  Cervical back: Normal range of motion and neck supple  Skin:     General: Skin is warm and dry  Neurological:      Mental Status: He is alert and oriented to person, place, and time  Deep Tendon Reflexes: Reflexes are normal and symmetric     Psychiatric:         Behavior: Behavior normal

## 2022-04-22 ENCOUNTER — OFFICE VISIT (OUTPATIENT)
Dept: FAMILY MEDICINE CLINIC | Facility: CLINIC | Age: 77
End: 2022-04-22
Payer: MEDICARE

## 2022-04-22 VITALS
DIASTOLIC BLOOD PRESSURE: 82 MMHG | SYSTOLIC BLOOD PRESSURE: 140 MMHG | WEIGHT: 173.8 LBS | HEART RATE: 57 BPM | TEMPERATURE: 97.5 F | OXYGEN SATURATION: 96 % | HEIGHT: 69 IN | RESPIRATION RATE: 16 BRPM | BODY MASS INDEX: 25.74 KG/M2

## 2022-04-22 DIAGNOSIS — I10 BENIGN ESSENTIAL HYPERTENSION: ICD-10-CM

## 2022-04-22 DIAGNOSIS — R20.2 FACIAL TINGLING SENSATION: ICD-10-CM

## 2022-04-22 DIAGNOSIS — R22.1 NECK SWELLING: Primary | ICD-10-CM

## 2022-04-22 DIAGNOSIS — E89.0 POSTPROCEDURAL HYPOTHYROIDISM: ICD-10-CM

## 2022-04-22 PROCEDURE — 99214 OFFICE O/P EST MOD 30 MIN: CPT | Performed by: FAMILY MEDICINE

## 2022-04-22 NOTE — PROGRESS NOTES
Assessment/Plan:  Chief Complaint   Patient presents with    Facial Swelling     patient states right side of jaw and neck are swollen for the past year      Patient Instructions   Here for right sided neck swelling and left neck tingling  Check CT soft tissue neck and consult ENT for evaluation  No problem-specific Assessment & Plan notes found for this encounter  Diagnoses and all orders for this visit:    Neck swelling  -     CT soft tissue neck w contrast; Future  -     Ambulatory Referral to Otolaryngology; Future    Benign essential hypertension    Facial tingling sensation    Postprocedural hypothyroidism          Subjective:      Patient ID: Norma Jamison  is a 64 y o  male  Facial Swelling (patient states right side of jaw and neck are swollen for the past year ) Patient would like this evaluated and did see ENT in past  No pain in neck and also has some tingling in left neck comes and goes and there is a hx of thyroid cancer in early 2011  Patient with hx of HTN and BP stable  The following portions of the patient's history were reviewed and updated as appropriate: allergies, current medications, past family history, past medical history, past social history, past surgical history and problem list     Review of Systems   Constitutional: Negative  HENT:        Right neck swelling and left facial tingling near left lower jaw   Eyes: Negative  Respiratory: Negative  Cardiovascular: Negative  Gastrointestinal: Negative  Endocrine: Negative  Genitourinary: Negative  Musculoskeletal: Negative  Skin: Negative  Allergic/Immunologic: Negative  Neurological: Negative  Hematological: Negative  Psychiatric/Behavioral: Negative            Objective:      /82 (BP Location: Left arm, Patient Position: Sitting, Cuff Size: Adult)   Pulse 57   Temp 97 5 °F (36 4 °C) (Temporal)   Resp 16   Ht 5' 9" (1 753 m)   Wt 78 8 kg (173 lb 12 8 oz)   SpO2 96% BMI 25 67 kg/m²          Physical Exam  Constitutional:       Appearance: He is well-developed  HENT:      Head: Normocephalic and atraumatic  Comments: Right neck soft tissue swelling mild worse than left     Right Ear: External ear normal       Left Ear: External ear normal       Nose: Nose normal    Eyes:      Conjunctiva/sclera: Conjunctivae normal       Pupils: Pupils are equal, round, and reactive to light  Cardiovascular:      Rate and Rhythm: Normal rate and regular rhythm  Heart sounds: Normal heart sounds  Pulmonary:      Effort: Pulmonary effort is normal       Breath sounds: Normal breath sounds  Musculoskeletal:         General: Normal range of motion  Cervical back: Normal range of motion and neck supple  Skin:     General: Skin is warm and dry  Neurological:      Mental Status: He is alert and oriented to person, place, and time  Deep Tendon Reflexes: Reflexes are normal and symmetric     Psychiatric:         Behavior: Behavior normal

## 2022-04-22 NOTE — PATIENT INSTRUCTIONS
Here for right sided neck swelling and left neck tingling  Check CT soft tissue neck and consult ENT for evaluation

## 2022-04-30 ENCOUNTER — HOSPITAL ENCOUNTER (OUTPATIENT)
Dept: CT IMAGING | Facility: HOSPITAL | Age: 77
Discharge: HOME | End: 2022-04-30
Payer: MEDICARE

## 2022-04-30 DIAGNOSIS — R22.1 NECK SWELLING: ICD-10-CM

## 2022-04-30 PROCEDURE — 70491 CT SOFT TISSUE NECK W/DYE: CPT

## 2022-04-30 PROCEDURE — G1004 CDSM NDSC: HCPCS

## 2022-04-30 RX ADMIN — IOHEXOL 85 ML: 350 INJECTION, SOLUTION INTRAVENOUS at 15:31

## 2022-05-03 ENCOUNTER — TELEPHONE (OUTPATIENT)
Dept: FAMILY MEDICINE CLINIC | Facility: CLINIC | Age: 77
End: 2022-05-03

## 2022-05-03 NOTE — TELEPHONE ENCOUNTER
Spoke with patient and gave him the information and he understood and will keep his appointment with Dr Radha Valentino

## 2022-05-03 NOTE — TELEPHONE ENCOUNTER
Patient called asking if he should still see Dr Alex Byrnes since his CT came back normal   His neck is still swollen

## 2022-05-04 ENCOUNTER — OFFICE VISIT (OUTPATIENT)
Dept: FAMILY MEDICINE CLINIC | Facility: CLINIC | Age: 77
End: 2022-05-04
Payer: MEDICARE

## 2022-05-04 VITALS
TEMPERATURE: 97.3 F | HEART RATE: 60 BPM | SYSTOLIC BLOOD PRESSURE: 140 MMHG | RESPIRATION RATE: 15 BRPM | DIASTOLIC BLOOD PRESSURE: 82 MMHG | BODY MASS INDEX: 26.54 KG/M2 | OXYGEN SATURATION: 98 % | HEIGHT: 69 IN | WEIGHT: 179.2 LBS

## 2022-05-04 DIAGNOSIS — M25.512 SEVERE PAIN OF LEFT SHOULDER: Primary | ICD-10-CM

## 2022-05-04 DIAGNOSIS — I10 BENIGN ESSENTIAL HYPERTENSION: ICD-10-CM

## 2022-05-04 DIAGNOSIS — I48.0 PAROXYSMAL ATRIAL FIBRILLATION (HCC): ICD-10-CM

## 2022-05-04 PROCEDURE — 99214 OFFICE O/P EST MOD 30 MIN: CPT | Performed by: FAMILY MEDICINE

## 2022-05-04 RX ORDER — LIDOCAINE 50 MG/G
1 PATCH TOPICAL DAILY
Qty: 6 PATCH | Refills: 0 | Status: SHIPPED | OUTPATIENT
Start: 2022-05-04 | End: 2022-08-09

## 2022-05-04 NOTE — PROGRESS NOTES
Assessment/Plan:  Chief Complaint   Patient presents with    Shoulder Pain     Severe L shoulder pain  Patient Instructions   Left shoulder pain, rec xray and also rec Ortho consult due to pain and inability to raise left arm overhead  HTN stable, also patient is allergic to percocet and Tramadol contraindicated  Patient on Xarelto, avoid any NSAIDs  May take Tylenol prn pain and use lidoderm patch trial          No problem-specific Assessment & Plan notes found for this encounter  Diagnoses and all orders for this visit:    Severe pain of left shoulder  Comments:  no trauma involved, occured 4 days ago and cant raise left arm over head  Rec trial of lidoderm patch due to allergy to opioids  Orders:  -     XR shoulder 2+ vw left; Future  -     Ambulatory Referral to Orthopedic Surgery; Future  -     lidocaine (Lidoderm) 5 %; Apply 1 patch topically daily Remove & Discard patch within 12 hours or as directed by MD    Benign essential hypertension    Paroxysmal atrial fibrillation (HCC)          Subjective:      Patient ID: Gabbie Bath  is a 64 y o  male  Shoulder Pain (Severe L shoulder pain  ) started last Saturday, no fall or traumatic event  No neck pain cant raise left arm over head due to pain  The following portions of the patient's history were reviewed and updated as appropriate: allergies, current medications, past family history, past medical history, past social history, past surgical history and problem list     Review of Systems   Constitutional: Negative  HENT: Negative  Eyes: Negative  Respiratory: Negative  Cardiovascular: Negative  Gastrointestinal: Negative  Endocrine: Negative  Genitourinary: Negative  Musculoskeletal:        Left shoulder pain   Skin: Negative  Allergic/Immunologic: Negative  Neurological: Negative  Hematological: Negative  Psychiatric/Behavioral: Negative            Objective:      /82 (BP Location: Right arm, Patient Position: Sitting, Cuff Size: Adult)   Pulse 60   Temp (!) 97 3 °F (36 3 °C) (Temporal)   Resp 15   Ht 5' 9" (1 753 m)   Wt 81 3 kg (179 lb 3 2 oz)   SpO2 98%   BMI 26 46 kg/m²          Physical Exam  Constitutional:       Appearance: He is well-developed  HENT:      Head: Normocephalic and atraumatic  Eyes:      Conjunctiva/sclera: Conjunctivae normal       Pupils: Pupils are equal, round, and reactive to light  Cardiovascular:      Rate and Rhythm: Normal rate and regular rhythm  Heart sounds: Normal heart sounds  Pulmonary:      Effort: Pulmonary effort is normal       Breath sounds: Normal breath sounds  Musculoskeletal:         General: Normal range of motion  Cervical back: Normal range of motion and neck supple  Comments: Left shoulder pain severe pain is 8/10   Skin:     General: Skin is warm and dry  Neurological:      Mental Status: He is alert and oriented to person, place, and time  Deep Tendon Reflexes: Reflexes are normal and symmetric     Psychiatric:         Behavior: Behavior normal

## 2022-05-04 NOTE — PATIENT INSTRUCTIONS
Left shoulder pain, rec xray and also rec Ortho consult due to pain and inability to raise left arm overhead  HTN stable, also patient is allergic to percocet and Tramadol contraindicated  Patient on Xarelto, avoid any NSAIDs   May take Tylenol prn pain and use lidoderm patch trial

## 2022-05-05 ENCOUNTER — TELEPHONE (OUTPATIENT)
Dept: FAMILY MEDICINE CLINIC | Facility: CLINIC | Age: 77
End: 2022-05-05

## 2022-05-05 NOTE — TELEPHONE ENCOUNTER
Prior auth started for pt's Lidocaine patches 5 %  Key # [id number]   Awaiting response from insurance co

## 2022-05-05 NOTE — TELEPHONE ENCOUNTER
Jhony Ocampo pt is requesting would you please be willing to read this article and let him know what you recommend

## 2022-05-05 NOTE — TELEPHONE ENCOUNTER
Ed called the office in regards to he came  across an article/ advice colomn in the morning call this morning  05/05/2022 that is similar to what he is going through  In the article this individual had an injection and following post shot he had severe shoulder pain  After the pt had therapy, saw specialists/ surgeon after months  they found it was the results of an injection being administered incorrectly results in [facility]  Pt received his 4th Covid 19 injection about a month ago pt had pain in arm at injection site for few days post shot then pain  in shoulder then it became more severe  Pt was unable to send the actual article via my chart or drop it off  Pt is asking can you review and comment    Pt's number is [phone]

## 2022-05-05 NOTE — TELEPHONE ENCOUNTER
Yes, I read article and this can be common with any vaccination if the needle was not placed in the correct location which can injure the area and caise some issues  I rec orthopedic consult if any pain and decreased range of motion  Sometimes a nerve injury or and area of muscle or tissue can cause the pain if injured from the needle

## 2022-05-09 ENCOUNTER — OFFICE VISIT (OUTPATIENT)
Dept: OBGYN CLINIC | Facility: HOSPITAL | Age: 77
End: 2022-05-09
Payer: MEDICARE

## 2022-05-09 ENCOUNTER — HOSPITAL ENCOUNTER (OUTPATIENT)
Dept: RADIOLOGY | Facility: HOSPITAL | Age: 77
Discharge: HOME | End: 2022-05-09

## 2022-05-09 VITALS
HEART RATE: 75 BPM | DIASTOLIC BLOOD PRESSURE: 86 MMHG | SYSTOLIC BLOOD PRESSURE: 142 MMHG | WEIGHT: 179 LBS | BODY MASS INDEX: 26.51 KG/M2 | HEIGHT: 69 IN

## 2022-05-09 DIAGNOSIS — M25.512 SEVERE PAIN OF LEFT SHOULDER: ICD-10-CM

## 2022-05-09 PROCEDURE — 99202 OFFICE O/P NEW SF 15 MIN: CPT | Performed by: PHYSICIAN ASSISTANT

## 2022-05-09 NOTE — PROGRESS NOTES
Assessment:    Left shoulder pain following a vaccine administration about 3 weeks ago  Left shoulder bursitis      Plan:    Over the last 3 to 4 days, the patient's symptoms have basically resolved with rest alone  No further treatment is warranted at this time  He was instructed to let us know if he had symptoms like this in the future and we can discuss steroid injection, oral medications or physical therapy  All questions answered             Problem List Items Addressed This Visit     None      Visit Diagnoses     Severe pain of left shoulder        no trauma involved, occured 4 days ago and cant raise left arm over head  Rec trial of lidoderm patch due to allergy to opioids    Relevant Orders    XR shoulder 2+ vw right                   Subjective:     Patient ID:  Bruno Campo  is a 64 y o  male    HPI    22-year-old male presenting for evaluation of his left shoulder  According to the patient, about three weeks ago he received a vaccine into his left shoulder and initially he had pain at the injection site however he noticed about two weeks following this, he had severe onset of shoulder pain with decreased range of motion to the point where he was unable to lift his shoulder  He did see primary care who referred him here for shoulder bursitis  In the interim over the last 3 to 4 days, patient states that his symptoms have basic really resolved  He has no range-of-motion restrictions and has no pain in his shoulder  There is mild tenderness at the lateral shoulder deltoid region only if it is palpated but is mild  He states his symptoms are 99% improved  No numbness and tingling in the arm      The following portions of the patient's history were reviewed and updated as appropriate: allergies, current medications, past family history, past medical history, past social history, past surgical history and problem list     Review of Systems     Objective:    Imaging: None      Vitals:    05/09/22 1457   BP: 142/86   Pulse: 75           Physical Exam     Orthopedic Examination:   left shoulder    Inspection: no open wounds or erythema  No obvious deformity  No effusion or ecchymosis  Palpation:  Lateral deltoid small area point tenderness without deformity  There is no warmth of the skin      Range-of-motion:  Full range of motion of the shoulder joint in all planes without restriction or pain    Strength: 5/5 deltoid supraspinatus infraspinatus subscapularis    Sensation: intact axillary muscular cutaneous median radial ulnar nerve distribution    Special Tests:     negative drop arm   Negative empty can   Negative Hannah/Neer's impingement   Upper extremities warm and well perfused   Palpable radial pulse

## 2022-05-13 DIAGNOSIS — E78.2 MIXED HYPERLIPIDEMIA: ICD-10-CM

## 2022-05-13 RX ORDER — PRAVASTATIN SODIUM 10 MG
TABLET ORAL
Qty: 90 TABLET | Refills: 3 | Status: SHIPPED | OUTPATIENT
Start: 2022-05-13

## 2022-07-05 ENCOUNTER — TELEPHONE (OUTPATIENT)
Dept: FAMILY MEDICINE CLINIC | Facility: CLINIC | Age: 77
End: 2022-07-05

## 2022-07-05 DIAGNOSIS — K21.9 GASTROESOPHAGEAL REFLUX DISEASE, UNSPECIFIED WHETHER ESOPHAGITIS PRESENT: ICD-10-CM

## 2022-07-05 DIAGNOSIS — I10 BENIGN ESSENTIAL HYPERTENSION: ICD-10-CM

## 2022-07-05 DIAGNOSIS — Z00.00 HEALTH CARE MAINTENANCE: ICD-10-CM

## 2022-07-05 DIAGNOSIS — E78.2 MIXED HYPERLIPIDEMIA: Primary | ICD-10-CM

## 2022-07-05 DIAGNOSIS — E55.9 VITAMIN D DEFICIENCY: ICD-10-CM

## 2022-07-05 DIAGNOSIS — Z12.5 SCREENING FOR PROSTATE CANCER: ICD-10-CM

## 2022-07-05 DIAGNOSIS — E03.9 HYPOTHYROIDISM, UNSPECIFIED TYPE: ICD-10-CM

## 2022-07-05 DIAGNOSIS — R73.03 PRE-DIABETES: ICD-10-CM

## 2022-07-26 ENCOUNTER — APPOINTMENT (OUTPATIENT)
Dept: LAB | Facility: CLINIC | Age: 77
End: 2022-07-26
Payer: MEDICARE

## 2022-07-26 DIAGNOSIS — I10 BENIGN ESSENTIAL HYPERTENSION: ICD-10-CM

## 2022-07-26 DIAGNOSIS — Z12.5 SCREENING FOR PROSTATE CANCER: ICD-10-CM

## 2022-07-26 DIAGNOSIS — E55.9 VITAMIN D DEFICIENCY: ICD-10-CM

## 2022-07-26 DIAGNOSIS — E78.2 MIXED HYPERLIPIDEMIA: ICD-10-CM

## 2022-07-26 DIAGNOSIS — Z00.00 HEALTH CARE MAINTENANCE: ICD-10-CM

## 2022-07-26 DIAGNOSIS — K21.9 GASTROESOPHAGEAL REFLUX DISEASE, UNSPECIFIED WHETHER ESOPHAGITIS PRESENT: ICD-10-CM

## 2022-07-26 DIAGNOSIS — E03.9 HYPOTHYROIDISM, UNSPECIFIED TYPE: ICD-10-CM

## 2022-07-26 DIAGNOSIS — R73.03 PRE-DIABETES: ICD-10-CM

## 2022-07-26 LAB
25(OH)D3 SERPL-MCNC: 82.7 NG/ML (ref 30–100)
ALBUMIN SERPL BCP-MCNC: 4.1 G/DL (ref 3.5–5)
ALP SERPL-CCNC: 61 U/L (ref 46–116)
ALT SERPL W P-5'-P-CCNC: 22 U/L (ref 12–78)
ANION GAP SERPL CALCULATED.3IONS-SCNC: 5 MMOL/L (ref 4–13)
AST SERPL W P-5'-P-CCNC: 23 U/L (ref 5–45)
BASOPHILS # BLD AUTO: 0.05 THOUSANDS/ΜL (ref 0–0.1)
BASOPHILS NFR BLD AUTO: 1 % (ref 0–1)
BILIRUB SERPL-MCNC: 1.34 MG/DL (ref 0.2–1)
BUN SERPL-MCNC: 19 MG/DL (ref 5–25)
CALCIUM SERPL-MCNC: 8.8 MG/DL (ref 8.3–10.1)
CHLORIDE SERPL-SCNC: 106 MMOL/L (ref 96–108)
CHOLEST SERPL-MCNC: 142 MG/DL
CO2 SERPL-SCNC: 26 MMOL/L (ref 21–32)
CREAT SERPL-MCNC: 0.78 MG/DL (ref 0.6–1.3)
EOSINOPHIL # BLD AUTO: 0.07 THOUSAND/ΜL (ref 0–0.61)
EOSINOPHIL NFR BLD AUTO: 1 % (ref 0–6)
ERYTHROCYTE [DISTWIDTH] IN BLOOD BY AUTOMATED COUNT: 14.2 % (ref 11.6–15.1)
GFR SERPL CREATININE-BSD FRML MDRD: 86 ML/MIN/1.73SQ M
GLUCOSE P FAST SERPL-MCNC: 104 MG/DL (ref 65–99)
HCT VFR BLD AUTO: 42.6 % (ref 36.5–49.3)
HDLC SERPL-MCNC: 54 MG/DL
HGB BLD-MCNC: 14.2 G/DL (ref 12–17)
IMM GRANULOCYTES # BLD AUTO: 0 THOUSAND/UL (ref 0–0.2)
IMM GRANULOCYTES NFR BLD AUTO: 0 % (ref 0–2)
LDLC SERPL CALC-MCNC: 82 MG/DL (ref 0–100)
LYMPHOCYTES # BLD AUTO: 2.08 THOUSANDS/ΜL (ref 0.6–4.47)
LYMPHOCYTES NFR BLD AUTO: 35 % (ref 14–44)
MCH RBC QN AUTO: 30.8 PG (ref 26.8–34.3)
MCHC RBC AUTO-ENTMCNC: 33.3 G/DL (ref 31.4–37.4)
MCV RBC AUTO: 92 FL (ref 82–98)
MONOCYTES # BLD AUTO: 0.43 THOUSAND/ΜL (ref 0.17–1.22)
MONOCYTES NFR BLD AUTO: 7 % (ref 4–12)
NEUTROPHILS # BLD AUTO: 3.31 THOUSANDS/ΜL (ref 1.85–7.62)
NEUTS SEG NFR BLD AUTO: 56 % (ref 43–75)
NRBC BLD AUTO-RTO: 0 /100 WBCS
PLATELET # BLD AUTO: 229 THOUSANDS/UL (ref 149–390)
PMV BLD AUTO: 10.4 FL (ref 8.9–12.7)
POTASSIUM SERPL-SCNC: 4.7 MMOL/L (ref 3.5–5.3)
PROT SERPL-MCNC: 7.2 G/DL (ref 6.4–8.4)
PSA SERPL-MCNC: 1.5 NG/ML (ref 0–4)
RBC # BLD AUTO: 4.61 MILLION/UL (ref 3.88–5.62)
SODIUM SERPL-SCNC: 137 MMOL/L (ref 135–147)
T4 FREE SERPL-MCNC: 1.22 NG/DL (ref 0.76–1.46)
TRIGL SERPL-MCNC: 32 MG/DL
TSH SERPL DL<=0.05 MIU/L-ACNC: 0.69 UIU/ML (ref 0.45–4.5)
WBC # BLD AUTO: 5.94 THOUSAND/UL (ref 4.31–10.16)

## 2022-07-26 PROCEDURE — 84443 ASSAY THYROID STIM HORMONE: CPT

## 2022-07-26 PROCEDURE — G0103 PSA SCREENING: HCPCS

## 2022-07-26 PROCEDURE — 83036 HEMOGLOBIN GLYCOSYLATED A1C: CPT | Performed by: FAMILY MEDICINE

## 2022-07-26 PROCEDURE — 80061 LIPID PANEL: CPT

## 2022-07-26 PROCEDURE — 84439 ASSAY OF FREE THYROXINE: CPT

## 2022-07-26 PROCEDURE — 85025 COMPLETE CBC W/AUTO DIFF WBC: CPT

## 2022-07-26 PROCEDURE — 80053 COMPREHEN METABOLIC PANEL: CPT

## 2022-07-26 PROCEDURE — 36415 COLL VENOUS BLD VENIPUNCTURE: CPT | Performed by: FAMILY MEDICINE

## 2022-07-26 PROCEDURE — 82306 VITAMIN D 25 HYDROXY: CPT

## 2022-07-26 NOTE — TELEPHONE ENCOUNTER
Patient coming in to be seen BP stable  - c/w home hydralazine 75mg TID + amlodipine 10mg daily  - Starting losartan 25 mg PO QD

## 2022-07-27 LAB
EST. AVERAGE GLUCOSE BLD GHB EST-MCNC: 123 MG/DL
HBA1C MFR BLD: 5.9 %

## 2022-08-01 ENCOUNTER — RA CDI HCC (OUTPATIENT)
Dept: OTHER | Facility: HOSPITAL | Age: 77
End: 2022-08-01

## 2022-08-01 NOTE — PROGRESS NOTES
sss  [address]  coding opportunities          Chart Reviewed number of suggestions sent to Provider: 1     Patients Insurance     Medicare Insurance: [facility]

## 2022-08-09 ENCOUNTER — OFFICE VISIT (OUTPATIENT)
Dept: FAMILY MEDICINE CLINIC | Facility: CLINIC | Age: 77
End: 2022-08-09
Payer: MEDICARE

## 2022-08-09 VITALS
HEART RATE: 60 BPM | OXYGEN SATURATION: 97 % | RESPIRATION RATE: 16 BRPM | TEMPERATURE: 97 F | SYSTOLIC BLOOD PRESSURE: 120 MMHG | HEIGHT: 69 IN | BODY MASS INDEX: 25.33 KG/M2 | WEIGHT: 171 LBS | DIASTOLIC BLOOD PRESSURE: 60 MMHG

## 2022-08-09 DIAGNOSIS — E55.9 VITAMIN D DEFICIENCY: ICD-10-CM

## 2022-08-09 DIAGNOSIS — I71.21 ANEURYSM, ASCENDING AORTA: ICD-10-CM

## 2022-08-09 DIAGNOSIS — F41.9 ANXIETY: ICD-10-CM

## 2022-08-09 DIAGNOSIS — R73.03 PRE-DIABETES: ICD-10-CM

## 2022-08-09 DIAGNOSIS — I48.0 PAROXYSMAL ATRIAL FIBRILLATION (HCC): Chronic | ICD-10-CM

## 2022-08-09 DIAGNOSIS — E89.0 POSTPROCEDURAL HYPOTHYROIDISM: ICD-10-CM

## 2022-08-09 DIAGNOSIS — Z00.00 MEDICARE ANNUAL WELLNESS VISIT, SUBSEQUENT: Primary | ICD-10-CM

## 2022-08-09 DIAGNOSIS — E78.2 MIXED HYPERLIPIDEMIA: ICD-10-CM

## 2022-08-09 DIAGNOSIS — I10 BENIGN ESSENTIAL HYPERTENSION: ICD-10-CM

## 2022-08-09 DIAGNOSIS — Z00.00 HEALTH CARE MAINTENANCE: ICD-10-CM

## 2022-08-09 PROCEDURE — 99214 OFFICE O/P EST MOD 30 MIN: CPT | Performed by: FAMILY MEDICINE

## 2022-08-09 PROCEDURE — G0439 PPPS, SUBSEQ VISIT: HCPCS | Performed by: FAMILY MEDICINE

## 2022-08-09 NOTE — PROGRESS NOTES
Assessment and Plan:     Problem List Items Addressed This Visit        Endocrine    Postprocedural hypothyroidism    Relevant Orders    TSH, 3rd generation    T4, free       Cardiovascular and Mediastinum    Aneurysm, ascending aorta (HCC)    Benign essential hypertension    Paroxysmal atrial fibrillation (HCC)       Other    Anxiety    Hyperlipidemia    Vitamin D deficiency    Pre-diabetes    Relevant Orders    Comprehensive metabolic panel    Hemoglobin A1C      Other Visit Diagnoses     Medicare annual wellness visit, subsequent    -  Primary    Health care maintenance               Preventive health issues were discussed with patient, and age appropriate screening tests were ordered as noted in patient's After Visit Summary  Personalized health advice and appropriate referrals for health education or preventive services given if needed, as noted in patient's After Visit Summary  History of Present Illness:     Patient presents for a Medicare Wellness Visit    Here for recheck and AWV and home is safe and has smoke detector  Here to review labs and all labs stable  No cp or sob, or ha and takes all meds as directed for HTN and hyperlipidemia and hypothyroidism  He does get monitored for hx of a-fib and aortic aneurysm  Patient Care Team:  Osmani Baum DO as PCP - Onelia Mcrae MD as PCP - Endocrinology (Endocrinology)  Lennis Merlin, MD Lillie Richard, MD Erline Parish, MD     Review of Systems:     Review of Systems   Constitutional: Negative  HENT: Negative  Eyes: Negative  Respiratory: Negative  Cardiovascular: Negative  Gastrointestinal: Negative  Endocrine: Negative  Genitourinary: Negative  Musculoskeletal: Negative  Skin: Negative  Allergic/Immunologic: Negative  Neurological: Negative  Hematological: Negative  Psychiatric/Behavioral: Negative           Problem List:     Patient Active Problem List   Diagnosis    Aneurysm, ascending aorta (HCC)    Anxiety    Benign essential hypertension    Esophageal reflux    Hyperlipidemia    Paroxysmal atrial fibrillation (HCC)    Postprocedural hypothyroidism    Pulmonary nodules    Sick sinus syndrome (HCC)    [facility] ([facility] )    Vitamin D deficiency    Pre-diabetes    Osteoporosis    Embolism and thrombosis of tibial vein, left (HCC)    Hematospermia    Other pulmonary embolism without acute cor pulmonale (HCC)    Traumatic rupture of quadriceps tendon      Past Medical and Surgical History:     Past Medical History:   Diagnosis Date    A-fib ([place] [address] ) 11/2002    Anxiety 01/15/2002    Aortic aneurysm ([place] [address] ) 08/26/2016    Arthritis     neck    Atrial fibrillation (HCC)     last assessed: 7/26/2017    Cancer ([facility])     skin, thyroid     Disease of thyroid gland     Disorder of epididymis     H/O malignant neoplasm of thyroid     Hyperlipidemia     last assessed: 6/29/2017    Hypertension     Left leg DVT ([place] [address] ) 03/07/2017    Osteopenia     Psychiatric disorder     Pulmonary embolism ([place] [address] )     last assessed: 7/26/2017     Past Surgical History:   Procedure Laterality Date    APPENDECTOMY      BASAL CELL CARCINOMA EXCISION      neck    CATARACT EXTRACTION  03/23/2022    right eye    CATARACT EXTRACTION  04/13/2022    left eye    COLONOSCOPY      complete    CYSTOSCOPY  02/01/2016    diagnostic    LEG SURGERY Left     quadricep    SQUAMOUS CELL CARCINOMA EXCISION      chest area, right leg    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      Family History:     Family History   Problem Relation Age of Onset    Coronary artery disease Father     Heart attack Father         acute myocardial infarction    Stroke Mother     Hypertension Mother       Social History:     Social History     Socioeconomic History    Marital status: /Civil Union     Spouse name: None    Number of children: None    Years of education: None    Highest education level: None   Occupational History    None   Tobacco Use    Smoking status: Former Smoker     Quit date:      Years since quittin 8    Smokeless tobacco: Never Used   Vaping Use    Vaping Use: Never used   Substance and Sexual Activity    Alcohol use: No    Drug use: No    Sexual activity: None   Other Topics Concern    None   Social History Narrative    Does not consume caffeine     Social Determinants of Health     Financial Resource Strain: Not on file   Food Insecurity: Not on file   Transportation Needs: Not on file   Physical Activity: Not on file   Stress: Not on file   Social Connections: Not on file   Intimate Partner Violence: Not on file   Housing Stability: Not on file      Medications and Allergies:     Current Outpatient Medications   Medication Sig Dispense Refill    ALPRAZolam (XANAX) 0 5 mg tablet Take 1 tablet (0 5 mg total) by mouth daily at bedtime as needed for anxiety 90 tablet 0    aspirin (ECOTRIN LOW STRENGTH) 81 mg EC tablet Take 81 mg by mouth daily      levothyroxine 112 mcg tablet TAKE 1 TABLET DAILY EARLY IN THE MORNING 90 tablet 3    lisinopril (ZESTRIL) 20 mg tablet Take 1 tablet (20 mg total) by mouth see administration instructions Take 1 tablet daily in a m  And in the p m  P r n  Elevated blood pressure 120 tablet 3    metoprolol succinate (TOPROL-XL) 25 mg 24 hr tablet TAKE 1 TABLET DAILY 90 tablet 3    pravastatin (PRAVACHOL) 10 mg tablet TAKE 1 TABLET DAILY 90 tablet 3    Xarelto 20 MG tablet TAKE 1 TABLET DAILY 90 tablet 3     No current facility-administered medications for this visit       Allergies   Allergen Reactions    Percocet [Oxycodone-Acetaminophen] Seizures    Penicillins      "nearly passed out"    Percocet [Oxycodone-Acetaminophen] Seizures      Immunizations:     Immunization History   Administered Date(s) Administered    COVID-19 MODERNA VACC 0 5 ML IM 2021, 2021, 2021, 04/15/2022  H1N1, All Formulations 01/08/2010    INFLUENZA 12/08/2004, 09/14/2018, 10/12/2021    Influenza Split High Dose Preservative Free IM 09/29/2015, 09/16/2016, 10/04/2017    Influenza, high dose seasonal 0 7 mL 09/19/2019, 09/24/2020    Pneumococcal 02/09/2011    Pneumococcal Conjugate 13-Valent 11/19/2018    Pneumococcal Conjugate PCV 7 02/09/2011      Health Maintenance:         Topic Date Due    Hepatitis C Screening  Never done         Topic Date Due    Pneumococcal Vaccine: 65+ Years (2 - PPSV23 or PCV20) 11/19/2019    Influenza Vaccine (1) 09/01/2022      Medicare Screening Tests and Risk Assessments:         Health Risk Assessment:   Patient rates overall health as very good  Patient feels that their physical health rating is same  Patient is very satisfied with their life  Eyesight was rated as same  Hearing was rated as same  Patient feels that their emotional and mental health rating is same  Patients states they are never, rarely angry  Patient states they are never, rarely unusually tired/fatigued  Pain experienced in the last 7 days has been none  Patient states that he has experienced no weight loss or gain in last 6 months  Fall Risk Screening: In the past year, patient has experienced: no history of falling in past year      Home Safety:  Patient does not have trouble with stairs inside or outside of their home  Patient has working smoke alarms and has working carbon monoxide detector  Home safety hazards include: none  Nutrition:   Current diet is Regular  Medications:   Patient is currently taking over-the-counter supplements  OTC medications include: see medication list  Patient is able to manage medications  Activities of Daily Living (ADLs)/Instrumental Activities of Daily Living (IADLs):   Walk and transfer into and out of bed and chair?: Yes  Dress and groom yourself?: Yes    Bathe or shower yourself?: Yes    Feed yourself?  Yes  Do your laundry/housekeeping?: Yes  Manage your money, pay your bills and track your expenses?: Yes  Make your own meals?: Yes    Do your own shopping?: Yes    Previous Hospitalizations:   Any hospitalizations or ED visits within the last 12 months?: No      Advance Care Planning:   Living will: Yes    Durable POA for healthcare: Yes    Advanced directive: Yes      Comments: Stable and UTD    PREVENTIVE SCREENINGS      Cardiovascular Screening:    General: Screening Not Indicated and History Lipid Disorder      Diabetes Screening:     General: Screening Current      Prostate Cancer Screening:    General: Screening Not Indicated      Osteoporosis Screening:    General: Screening Not Indicated and History Osteoporosis      Abdominal Aortic Aneurysm (AAA) Screening:    Risk factors include: tobacco use        Lung Cancer Screening:     General: Screening Not Indicated    Screening, Brief Intervention, and Referral to Treatment (SBIRT)    Screening  Typical number of drinks in a day: 0  Typical number of drinks in a week: 0  Interpretation: Low risk drinking behavior  Single Item Drug Screening:  How often have you used an illegal drug (including marijuana) or a prescription medication for non-medical reasons in the past year? never    Single Item Drug Screen Score: 0  Interpretation: Negative screen for possible drug use disorder    No exam data present     Physical Exam:     /60   Pulse 60   Temp (!) 97 °F (36 1 °C) (Temporal)   Resp 16   Ht 5' 9" (1 753 m)   Wt 77 6 kg (171 lb)   SpO2 97%   BMI 25 25 kg/m²     Physical Exam  Vitals and nursing note reviewed  Constitutional:       Appearance: He is well-developed  HENT:      Head: Normocephalic and atraumatic  Eyes:      Conjunctiva/sclera: Conjunctivae normal    Cardiovascular:      Rate and Rhythm: Normal rate and regular rhythm  Heart sounds: No murmur heard  Pulmonary:      Effort: Pulmonary effort is normal  No respiratory distress        Breath sounds: Normal breath sounds  Abdominal:      Palpations: Abdomen is soft  Tenderness: There is no abdominal tenderness  Musculoskeletal:         General: Normal range of motion  Cervical back: Neck supple  Skin:     General: Skin is warm and dry  Capillary Refill: Capillary refill takes less than 2 seconds  Neurological:      General: No focal deficit present  Mental Status: He is alert and oriented to person, place, and time  Psychiatric:         Mood and Affect: Mood normal          Behavior: Behavior normal          Thought Content:  Thought content normal          Judgment: Judgment normal           Elane Level, DO

## 2022-08-09 NOTE — PATIENT INSTRUCTIONS
Medicare Preventive Visit Patient Instructions  Thank you for completing your Welcome to Medicare Visit or Medicare Annual Wellness Visit today  Your next wellness visit will be due in one year (8/10/2023)  The screening/preventive services that you may require over the next 5-10 years are detailed below  Some tests may not apply to you based off risk factors and/or age  Screening tests ordered at today's visit but not completed yet may show as past due  Also, please note that scanned in results may not display below  Preventive Screenings:  Service Recommendations Previous Testing/Comments   Colorectal Cancer Screening  Colonoscopy    Fecal Occult Blood Test (FOBT)/Fecal Immunochemical Test (FIT)  Fecal DNA/Cologuard Test  Flexible Sigmoidoscopy Age: 39-70 years old   Colonoscopy: every 10 years (May be performed more frequently if at higher risk)  OR  FOBT/FIT: every 1 year  OR  Cologuard: every 3 years  OR  Sigmoidoscopy: every 5 years  Screening may be recommended earlier than age 39 if at higher risk for colorectal cancer  Also, an individualized decision between you and your healthcare provider will decide whether screening between the ages of 74-80 would be appropriate  Colonoscopy: 08/17/2021  FOBT/FIT: Not on file  Cologuard: Not on file  Sigmoidoscopy: Not on file          Prostate Cancer Screening Individualized decision between patient and health care provider in men between ages of 53-78   Medicare will cover every 12 months beginning on the day after your 50th birthday PSA: 1 5 ng/mL           Hepatitis C Screening Once for adults born between 1945 and 1965  More frequently in patients at high risk for Hepatitis C Hep C Antibody: Not on file        Diabetes Screening 1-2 times per year if you're at risk for diabetes or have pre-diabetes Fasting glucose: 104 mg/dL (7/26/2022)  A1C: 5 9 % (7/26/2022)      Cholesterol Screening Once every 5 years if you don't have a lipid disorder   May order more often based on risk factors  Lipid panel: 07/26/2022         Other Preventive Screenings Covered by Medicare:  Abdominal Aortic Aneurysm (AAA) Screening: covered once if your at risk  You're considered to be at risk if you have a family history of AAA or a male between the age of 73-68 who smoking at least 100 cigarettes in your lifetime  Lung Cancer Screening: covers low dose CT scan once per year if you meet all of the following conditions: (1) Age 50-69; (2) No signs or symptoms of lung cancer; (3) Current smoker or have quit smoking within the last 15 years; (4) You have a tobacco smoking history of at least 20 pack years (packs per day x number of years you smoked); (5) You get a written order from a healthcare provider  Glaucoma Screening: covered annually if you're considered high risk: (1) You have diabetes OR (2) Family history of glaucoma OR (3)  aged 48 and older OR (3)  American aged 72 and older  Osteoporosis Screening: covered every 2 years if you meet one of the following conditions: (1) Have a vertebral abnormality; (2) On glucocorticoid therapy for more than 3 months; (3) Have primary hyperparathyroidism; (4) On osteoporosis medications and need to assess response to drug therapy  HIV Screening: covered annually if you're between the age of 12-76  Also covered annually if you are younger than 13 and older than 72 with risk factors for HIV infection  For pregnant patients, it is covered up to 3 times per pregnancy      Immunizations:  Immunization Recommendations   Influenza Vaccine Annual influenza vaccination during flu season is recommended for all persons aged >= 6 months who do not have contraindications   Pneumococcal Vaccine   * Pneumococcal conjugate vaccine = PCV13 (Prevnar 13), PCV15 (Vaxneuvance), PCV20 (Prevnar 20)  * Pneumococcal polysaccharide vaccine = PPSV23 (Pneumovax) Adults 2364 years old: 1-3 doses may be recommended based on certain risk factors  Adults 72 years old: 1-2 doses may be recommended based off what pneumonia vaccine you previously received   Hepatitis B Vaccine 3 dose series if at intermediate or high risk (ex: diabetes, end stage renal disease, liver disease)   Tetanus (Td) Vaccine - COST NOT COVERED BY MEDICARE PART B Following completion of primary series, a booster dose should be given every 10 years to maintain immunity against tetanus  Td may also be given as tetanus wound prophylaxis  Tdap Vaccine - COST NOT COVERED BY MEDICARE PART B Recommended at least once for all adults  For pregnant patients, recommended with each pregnancy  Shingles Vaccine (Shingrix) - COST NOT COVERED BY MEDICARE PART B  2 shot series recommended in those aged 48 and above     Health Maintenance Due:      Topic Date Due    Hepatitis C Screening  Never done     Immunizations Due:      Topic Date Due    Pneumococcal Vaccine: 65+ Years (2 - PPSV23 or PCV20) 11/19/2019    Influenza Vaccine (1) 09/01/2022     Advance Directives   What are advance directives? Advance directives are legal documents that state your wishes and plans for medical care  These plans are made ahead of time in case you lose your ability to make decisions for yourself  Advance directives can apply to any medical decision, such as the treatments you want, and if you want to donate organs  What are the types of advance directives? There are many types of advance directives, and each state has rules about how to use them  You may choose a combination of any of the following:  Living will: This is a written record of the treatment you want  You can also choose which treatments you do not want, which to limit, and which to stop at a certain time  This includes surgery, medicine, IV fluid, and tube feedings  Durable power of  for healthcare [place] [facility]): This is a written record that states who you want to make healthcare choices for you when you are unable to make them for yourself   This person, called a proxy, is usually a family member or a friend  You may choose more than 1 proxy  Do not resuscitate (DNR) order:  A DNR order is used in case your heart stops beating or you stop breathing  It is a request not to have certain forms of treatment, such as CPR  A DNR order may be included in other types of advance directives  Medical directive: This covers the care that you want if you are in a coma, near death, or unable to make decisions for yourself  You can list the treatments you want for each condition  Treatment may include pain medicine, surgery, blood transfusions, dialysis, IV or tube feedings, and a ventilator (breathing machine)  Values history: This document has questions about your views, beliefs, and how you feel and think about life  This information can help others choose the care that you would choose  Why are advance directives important? An advance directive helps you control your care  Although spoken wishes may be used, it is better to have your wishes written down  Spoken wishes can be misunderstood, or not followed  Treatments may be given even if you do not want them  An advance directive may make it easier for your family to make difficult choices about your care  Weight Management   Why it is important to manage your weight:  Being overweight increases your risk of health conditions such as heart disease, high blood pressure, type 2 diabetes, and certain types of cancer  It can also increase your risk for osteoarthritis, sleep apnea, and other respiratory problems  Aim for a slow, steady weight loss  Even a small amount of weight loss can lower your risk of health problems  How to lose weight safely:  A safe and healthy way to lose weight is to eat fewer calories and get regular exercise  You can lose up about 1 pound a week by decreasing the number of calories you eat by 500 calories each day     Healthy meal plan for weight management:  A healthy meal plan includes a variety of foods, contains fewer calories, and helps you stay healthy  A healthy meal plan includes the following:  Eat whole-grain foods more often  A healthy meal plan should contain fiber  Fiber is the part of grains, fruits, and vegetables that is not broken down by your body  Whole-grain foods are healthy and provide extra fiber in your diet  Some examples of whole-grain foods are whole-wheat breads and pastas, oatmeal, brown rice, and bulgur  Eat a variety of vegetables every day  Include dark, leafy greens such as spinach, kale, [place] greens, and mustard greens  Eat yellow and orange vegetables such as carrots, sweet potatoes, and winter squash  Eat a variety of fruits every day  Choose fresh or canned fruit (canned in its own juice or light syrup) instead of juice  Fruit juice has very little or no fiber  Eat low-fat dairy foods  Drink fat-free (skim) milk or 1% milk  Eat fat-free yogurt and low-fat cottage cheese  Try low-fat cheeses such as mozzarella and other reduced-fat cheeses  Choose meat and other protein foods that are low in fat  Choose beans or other legumes such as split peas or lentils  Choose fish, skinless poultry (chicken or turkey), or lean cuts of red meat (beef or pork)  Before you cook meat or poultry, cut off any visible fat  Use less fat and oil  Try baking foods instead of frying them  Add less fat, such as margarine, sour cream, regular salad dressing and mayonnaise to foods  Eat fewer high-fat foods  Some examples of high-fat foods include french fries, doughnuts, ice cream, and cakes  Eat fewer sweets  Limit foods and drinks that are high in sugar  This includes candy, cookies, regular soda, and sweetened drinks  Exercise:  Exercise at least 30 minutes per day on most days of the week  Some examples of exercise include walking, biking, dancing, and swimming   You can also fit in more physical activity by taking the stairs instead of the elevator or parking farther away from stores  Ask your healthcare provider about the best exercise plan for you  © Copyright [employer] 2018 Information is for End User's use only and may not be sold, redistributed or otherwise used for commercial purposes  All illustrations and images included in CareNotes® are the copyrighted property of A D A M [employer]  or Jerrod Mayer      Here for AWV and recheck on labs and also f-up to BP and thyroid and hyperlipidemia and prediabetes and vitamin d deficiency  Hga1c at 5 9 and will rec low sugar and low cholesterol diet and rec recheck in 6 months with labs

## 2022-10-05 DIAGNOSIS — I10 HYPERTENSION, UNSPECIFIED TYPE: ICD-10-CM

## 2022-10-05 RX ORDER — METOPROLOL SUCCINATE 25 MG/1
TABLET, EXTENDED RELEASE ORAL
Qty: 90 TABLET | Refills: 3 | Status: SHIPPED | OUTPATIENT
Start: 2022-10-05

## 2022-11-11 DIAGNOSIS — I26.99 OTHER PULMONARY EMBOLISM WITHOUT ACUTE COR PULMONALE, UNSPECIFIED CHRONICITY (HCC): ICD-10-CM

## 2022-11-11 RX ORDER — RIVAROXABAN 20 MG/1
TABLET, FILM COATED ORAL
Qty: 90 TABLET | Refills: 3 | Status: SHIPPED | OUTPATIENT
Start: 2022-11-11

## 2022-12-01 DIAGNOSIS — I10 BENIGN ESSENTIAL HYPERTENSION: ICD-10-CM

## 2022-12-01 RX ORDER — LISINOPRIL 20 MG/1
20 TABLET ORAL SEE ADMIN INSTRUCTIONS
Qty: 180 TABLET | Refills: 3 | Status: SHIPPED | OUTPATIENT
Start: 2022-12-01

## 2022-12-12 ENCOUNTER — OFFICE VISIT (OUTPATIENT)
Dept: CARDIOLOGY CLINIC | Facility: CLINIC | Age: 77
End: 2022-12-12

## 2022-12-12 VITALS
HEIGHT: 69 IN | BODY MASS INDEX: 25.51 KG/M2 | WEIGHT: 172.2 LBS | HEART RATE: 57 BPM | DIASTOLIC BLOOD PRESSURE: 70 MMHG | SYSTOLIC BLOOD PRESSURE: 136 MMHG | OXYGEN SATURATION: 99 %

## 2022-12-12 DIAGNOSIS — E78.2 MIXED HYPERLIPIDEMIA: ICD-10-CM

## 2022-12-12 DIAGNOSIS — I10 BENIGN ESSENTIAL HYPERTENSION: ICD-10-CM

## 2022-12-12 DIAGNOSIS — I48.0 PAROXYSMAL ATRIAL FIBRILLATION (HCC): Primary | ICD-10-CM

## 2022-12-12 DIAGNOSIS — I49.5 SICK SINUS SYNDROME (HCC): ICD-10-CM

## 2022-12-12 DIAGNOSIS — I82.442: ICD-10-CM

## 2022-12-12 DIAGNOSIS — I71.21 ANEURYSM OF ASCENDING AORTA WITHOUT RUPTURE: ICD-10-CM

## 2022-12-12 NOTE — PROGRESS NOTES
Cardiology Consultation     Navi Suarez  [id number]  1945  [facility] [place]  [address] [place] [id number]      1  Paroxysmal atrial fibrillation (HCC)  Echo complete w/ contrast if indicated      2  Benign essential hypertension        3  Mixed hyperlipidemia        4  Sick sinus syndrome (HCC)        5  Embolism and thrombosis of tibial vein, left (HCC)        6  Aneurysm of ascending aorta without rupture            Discussion/Summary:  Paroxysmal atrial fibrillation  - Continue with Xarelto 20 mg daily for anticoagulation  - Rate control with Toprol-XL 25 mg daily  -TTE 9/8/2016 showed EF 65 to 70%, mild LAE, mild to moderate MR, mild AI, mild TR, mild PI, mild to moderately dilated aortic root  -will recheck TTE to reassess valvular disease  Sick sinus syndrome  - Heart rate noted to be 43 bpm at last office visit during which time he was asymptomatic  -Heart rates in the 50s to 60s on home BP log  - Mild first-degree AV block on EKG  -Continue to monitor for now  Hypertension  - Continue with lisinopril 20 mg daily and Toprol-XL 25 mg daily  - Blood pressure well controlled today and on his home BP log  Hyperlipidemia  - Continue with pravastatin 10 mg daily  -Lipid panel 7/26/2022 showed total cholesterol 142, triglycerides 32, HDL 54, LDL 82  Ascending aortic aneurysm  - Being followed by CT surgery  - Measured 4 3 cm on CT in March 2021, and has been stable at this size for many years  DVT  -Initially diagnosed in 2017  - Continue with Xarelto 20 mg daily for anticoagulation  Prediabetes    History of Present Illness:  Navi Suarez  is a 76y o  year old male with a past medical history of paroxysmal atrial fibrillation, sick sinus syndrome, hypertension, hyperlipidemia, ascending aortic aneurysm, DVT/PE and prediabetes  He reports feeling well today and has no complaints    His wife takes his blood pressure usually over the night and it appears well controlled on the BP log  Denies any episodes of syncope, headache, dizziness, or other symptoms of bradycardia  He reports his heart rate was in the 40s when he used to jog regularly, but he stopped jogging once he turned 68  He still exercises on an elliptical regularly      Patient Active Problem List   Diagnosis   • Aneurysm, ascending aorta   • Anxiety   • Benign essential hypertension   • Esophageal reflux   • Hyperlipidemia   • Paroxysmal atrial fibrillation (HCC)   • Postprocedural hypothyroidism   • Pulmonary nodules   • Sick sinus syndrome (HCC)   • Thyroid cancer (HCC)   • Vitamin D deficiency   • Pre-diabetes   • Osteoporosis   • Embolism and thrombosis of tibial vein, left (HCC)   • Hematospermia   • Other pulmonary embolism without acute cor pulmonale (HCC)   • Traumatic rupture of quadriceps tendon     Past Medical History:   Diagnosis Date   • A-fib ([place] [address] ) 2002   • Anxiety 01/15/2002   • Aortic aneurysm ([place] [address] ) 2016   • Arthritis     neck   • Atrial fibrillation (HCC)     last assessed: 2017   • Cancer ([facility])     skin, thyroid    • Disease of thyroid gland    • Disorder of epididymis    • H/O malignant neoplasm of thyroid    • Hyperlipidemia     last assessed: 2017   • Hypertension    • Left leg DVT ([place] [address] ) 2017   • Osteopenia    • Psychiatric disorder    • Pulmonary embolism ([place] [address] )     last assessed: 2017     Social History     Socioeconomic History   • Marital status: /Civil Union     Spouse name: Not on file   • Number of children: Not on file   • Years of education: Not on file   • Highest education level: Not on file   Occupational History   • Not on file   Tobacco Use   • Smoking status: Former     Types: Cigarettes     Quit date: 5     Years since quittin 9   • Smokeless tobacco: Never   Vaping Use   • Vaping Use: Never used   Substance and Sexual Activity   • Alcohol use: No   • Drug use: No   • Sexual activity: Not on file   Other Topics Concern   • Not on file Social History Narrative    Does not consume caffeine     Social Determinants of Health     Financial Resource Strain: Not on file   Food Insecurity: Not on file   Transportation Needs: Not on file   Physical Activity: Not on file   Stress: Not on file   Social Connections: Not on file   Intimate Partner Violence: Not on file   Housing Stability: Not on file      Family History   Problem Relation Age of Onset   • Coronary artery disease Father    • Heart attack Father         acute myocardial infarction   • Stroke Mother    • Hypertension Mother      Past Surgical History:   Procedure Laterality Date   • APPENDECTOMY     • BASAL CELL CARCINOMA EXCISION      neck   • CATARACT EXTRACTION  03/23/2022    right eye   • CATARACT EXTRACTION  04/13/2022    left eye   • COLONOSCOPY      complete   • CYSTOSCOPY  02/01/2016    diagnostic   • LEG SURGERY Left     quadricep   • SQUAMOUS CELL CARCINOMA EXCISION      chest area, right leg   • 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       Current Outpatient Medications:   •  ALPRAZolam (XANAX) 0 5 mg tablet, Take 1 tablet (0 5 mg total) by mouth daily at bedtime as needed for anxiety, Disp: 90 tablet, Rfl: 0  •  aspirin (ECOTRIN LOW STRENGTH) 81 mg EC tablet, Take 81 mg by mouth daily, Disp: , Rfl:   •  levothyroxine 112 mcg tablet, TAKE 1 TABLET DAILY EARLY IN THE MORNING, Disp: 90 tablet, Rfl: 3  •  lisinopril (ZESTRIL) 20 mg tablet, Take 1 tablet (20 mg total) by mouth see administration instructions Take 1 tablet daily in a m  And in the p m  P r n   Elevated blood pressure, Disp: 180 tablet, Rfl: 3  •  metoprolol succinate (TOPROL-XL) 25 mg 24 hr tablet, TAKE 1 TABLET DAILY, Disp: 90 tablet, Rfl: 3  •  pravastatin (PRAVACHOL) 10 mg tablet, TAKE 1 TABLET DAILY, Disp: 90 tablet, Rfl: 3  •  Xarelto 20 MG tablet, TAKE 1 TABLET DAILY, Disp: 90 tablet, Rfl: 3  Allergies   Allergen Reactions   • Percocet [Oxycodone-Acetaminophen] Seizures   • Penicillins "nearly passed out"   • Percocet [Oxycodone-Acetaminophen] Seizures         Labs:  Lab Results   Component Value Date    ALT 22 07/26/2022    AST 23 07/26/2022    BUN 19 07/26/2022    CALCIUM 8 8 07/26/2022     07/26/2022    CHOL 170 03/12/2015    CO2 26 07/26/2022    CREATININE 0 78 07/26/2022    HDL 54 07/26/2022    HCT 42 6 07/26/2022    HGB 14 2 07/26/2022    HGBA1C 5 9 (H) 07/26/2022     07/26/2022    K 4 7 07/26/2022    PSA 1 5 07/26/2022     09/08/2015    TRIG 32 07/26/2022    WBC 5 94 07/26/2022       Imaging: No results found  ECG: 3/8/2022 sinus bradycardia with a heart rate of 43 bpm, first-degree AV block    Review of Systems:  Review of Systems   Constitutional: Negative for chills, diaphoresis, fatigue and fever  HENT: Negative for congestion  Eyes: Negative for photophobia and visual disturbance  Respiratory: Negative for chest tightness and shortness of breath  Cardiovascular: Negative for chest pain, palpitations and leg swelling  Gastrointestinal: Negative for abdominal distention, abdominal pain, diarrhea, nausea and vomiting  Genitourinary: Negative for difficulty urinating and dysuria  Musculoskeletal: Negative for arthralgias, gait problem and joint swelling  Skin: Negative for color change, pallor and rash  Neurological: Negative for dizziness, syncope, numbness and headaches  Psychiatric/Behavioral: Negative for agitation, behavioral problems and confusion           Vitals:    12/12/22 1310   BP: 136/70   Pulse: 57   SpO2: 99%      Vitals:    12/12/22 1310   Weight: 78 1 kg (172 lb 3 2 oz)     Height: 5' 9" (175 3 cm)     Physical Exam:  General appearance:  Appears stated age, alert, well appearing and in no distress  HEENT:  PERRLA, EOMI, no scleral icterus, no conjunctival pallor  NECK:  Supple, No elevated JVP, no thyromegaly, no carotid bruits  HEART: Bradycardic, regular rhythm, occasional ectopic beat, 3/6 systolic murmur  LUNGS:  Clear to auscultation bilaterally  ABDOMEN:  Soft, non-tender, positive bowel sounds, no rebound or guarding, no organomegaly   EXTREMITIES:  No edema  VASCULAR:  Normal pedal pulses   SKIN: No lesions or rashes on exposed skin  NEURO:  CN II-XII intact, no focal deficits

## 2022-12-13 ENCOUNTER — HOSPITAL ENCOUNTER (OUTPATIENT)
Dept: NON INVASIVE DIAGNOSTICS | Facility: HOSPITAL | Age: 77
Discharge: HOME | End: 2022-12-13
Attending: STUDENT IN AN ORGANIZED HEALTH CARE EDUCATION/TRAINING PROGRAM

## 2022-12-13 VITALS
BODY MASS INDEX: 25.48 KG/M2 | SYSTOLIC BLOOD PRESSURE: 136 MMHG | DIASTOLIC BLOOD PRESSURE: 70 MMHG | WEIGHT: 172 LBS | HEIGHT: 69 IN | HEART RATE: 46 BPM

## 2022-12-13 DIAGNOSIS — I48.0 PAROXYSMAL ATRIAL FIBRILLATION (HCC): ICD-10-CM

## 2022-12-13 LAB
AORTIC ROOT: 3.6 CM
AORTIC VALVE MEAN VELOCITY: 9.6 M/S
APICAL FOUR CHAMBER EJECTION FRACTION: 62 %
ASCENDING AORTA: 4 CM
AV AREA BY CONTINUOUS VTI: 3 CM2
AV AREA PEAK VELOCITY: 2.9 CM2
AV LVOT MEAN GRADIENT: 5 MMHG
AV LVOT PEAK GRADIENT: 7 MMHG
AV MEAN GRADIENT: 4 MMHG
AV PEAK GRADIENT: 8 MMHG
AV VALVE AREA: 3.02 CM2
AV VELOCITY RATIO: 0.91
DOP CALC AO PEAK VEL: 1.41 M/S
DOP CALC AO VTI: 36.18 CM
DOP CALC LVOT AREA: 3.14 CM2
DOP CALC LVOT DIAMETER: 2 CM
DOP CALC LVOT PEAK VEL VTI: 34.75 CM
DOP CALC LVOT PEAK VEL: 1.28 M/S
DOP CALC LVOT STROKE INDEX: 56.2 ML/M2
DOP CALC LVOT STROKE VOLUME: 109.12
E WAVE DECELERATION TIME: 199 MS
FRACTIONAL SHORTENING: 33 (ref 28–44)
INTERVENTRICULAR SEPTUM IN DIASTOLE (PARASTERNAL SHORT AXIS VIEW): 1.4 CM
INTERVENTRICULAR SEPTUM: 1.4 CM (ref 0.6–1.1)
LAAS-AP2: 25.6 CM2
LAAS-AP4: 26.3 CM2
LEFT ATRIUM AREA SYSTOLE SINGLE PLANE A4C: 25.6 CM2
LEFT ATRIUM SIZE: 3.7 CM
LEFT INTERNAL DIMENSION IN SYSTOLE: 3 CM (ref 2.1–4)
LEFT VENTRICULAR INTERNAL DIMENSION IN DIASTOLE: 4.5 CM (ref 3.5–6)
LEFT VENTRICULAR POSTERIOR WALL IN END DIASTOLE: 1.2 CM
LEFT VENTRICULAR STROKE VOLUME: 60 ML
LVSV (TEICH): 60 ML
MV PEAK A VEL: 0.8 M/S
MV PEAK E VEL: 62 CM/S
MV STENOSIS PRESSURE HALF TIME: 58 MS
MV VALVE AREA P 1/2 METHOD: 3.79
RA PRESSURE ESTIMATED: 5 MMHG
RIGHT ATRIUM AREA SYSTOLE A4C: 23 CM2
RIGHT VENTRICLE ID DIMENSION: 4.8 CM
RV PSP: 36 MMHG
SL CV LEFT ATRIUM LENGTH A2C: 5.9 CM
SL CV LV EF: 65
SL CV PED ECHO LEFT VENTRICLE DIASTOLIC VOLUME (MOD BIPLANE) 2D: 94 ML
SL CV PED ECHO LEFT VENTRICLE SYSTOLIC VOLUME (MOD BIPLANE) 2D: 35 ML
TR MAX PG: 31 MMHG
TR PEAK VELOCITY: 2.8 M/S
TRICUSPID VALVE PEAK REGURGITATION VELOCITY: 2.8 M/S

## 2023-01-18 DIAGNOSIS — I71.21 ANEURYSM OF ASCENDING AORTA WITHOUT RUPTURE: Primary | ICD-10-CM

## 2023-01-23 DIAGNOSIS — F41.9 ANXIETY: ICD-10-CM

## 2023-01-23 RX ORDER — ALPRAZOLAM 0.5 MG/1
0.5 TABLET ORAL
Qty: 90 TABLET | Refills: 0 | Status: SHIPPED | OUTPATIENT
Start: 2023-01-23

## 2023-01-24 ENCOUNTER — APPOINTMENT (OUTPATIENT)
Dept: LAB | Facility: CLINIC | Age: 78
End: 2023-01-24

## 2023-01-24 DIAGNOSIS — R73.03 PRE-DIABETES: ICD-10-CM

## 2023-01-24 DIAGNOSIS — E03.9 HYPOTHYROIDISM, UNSPECIFIED TYPE: Primary | ICD-10-CM

## 2023-01-24 DIAGNOSIS — E03.9 HYPOTHYROIDISM, UNSPECIFIED TYPE: ICD-10-CM

## 2023-01-24 DIAGNOSIS — E89.0 POSTPROCEDURAL HYPOTHYROIDISM: ICD-10-CM

## 2023-01-24 LAB
ALBUMIN SERPL BCP-MCNC: 4.2 G/DL (ref 3.5–5)
ALP SERPL-CCNC: 61 U/L (ref 46–116)
ALT SERPL W P-5'-P-CCNC: 27 U/L (ref 12–78)
ANION GAP SERPL CALCULATED.3IONS-SCNC: 6 MMOL/L (ref 4–13)
AST SERPL W P-5'-P-CCNC: 27 U/L (ref 5–45)
BILIRUB SERPL-MCNC: 1.46 MG/DL (ref 0.2–1)
BUN SERPL-MCNC: 15 MG/DL (ref 5–25)
CALCIUM SERPL-MCNC: 8.8 MG/DL (ref 8.3–10.1)
CHLORIDE SERPL-SCNC: 98 MMOL/L (ref 96–108)
CO2 SERPL-SCNC: 27 MMOL/L (ref 21–32)
CREAT SERPL-MCNC: 0.82 MG/DL (ref 0.6–1.3)
GFR SERPL CREATININE-BSD FRML MDRD: 84 ML/MIN/1.73SQ M
GLUCOSE P FAST SERPL-MCNC: 97 MG/DL (ref 65–99)
POTASSIUM SERPL-SCNC: 4.7 MMOL/L (ref 3.5–5.3)
PROT SERPL-MCNC: 7.3 G/DL (ref 6.4–8.4)
SODIUM SERPL-SCNC: 131 MMOL/L (ref 135–147)
T4 FREE SERPL-MCNC: 0.98 NG/DL (ref 0.76–1.46)
TSH SERPL DL<=0.05 MIU/L-ACNC: 8.77 UIU/ML (ref 0.45–4.5)

## 2023-01-24 RX ORDER — LEVOTHYROXINE SODIUM 0.12 MG/1
125 TABLET ORAL
Qty: 90 TABLET | Refills: 3 | Status: SHIPPED | OUTPATIENT
Start: 2023-01-24

## 2023-01-25 LAB
EST. AVERAGE GLUCOSE BLD GHB EST-MCNC: 117 MG/DL
HBA1C MFR BLD: 5.7 %

## 2023-01-31 ENCOUNTER — OFFICE VISIT (OUTPATIENT)
Dept: FAMILY MEDICINE CLINIC | Facility: CLINIC | Age: 78
End: 2023-01-31

## 2023-01-31 VITALS
SYSTOLIC BLOOD PRESSURE: 124 MMHG | BODY MASS INDEX: 27.5 KG/M2 | WEIGHT: 175.2 LBS | OXYGEN SATURATION: 98 % | HEIGHT: 67 IN | RESPIRATION RATE: 16 BRPM | DIASTOLIC BLOOD PRESSURE: 72 MMHG | TEMPERATURE: 96.1 F | HEART RATE: 56 BPM

## 2023-01-31 DIAGNOSIS — I82.442: ICD-10-CM

## 2023-01-31 DIAGNOSIS — E55.9 VITAMIN D DEFICIENCY: ICD-10-CM

## 2023-01-31 DIAGNOSIS — R73.03 PRE-DIABETES: ICD-10-CM

## 2023-01-31 DIAGNOSIS — I10 BENIGN ESSENTIAL HYPERTENSION: Primary | ICD-10-CM

## 2023-01-31 DIAGNOSIS — I48.0 PAROXYSMAL ATRIAL FIBRILLATION (HCC): ICD-10-CM

## 2023-01-31 DIAGNOSIS — K21.9 GASTROESOPHAGEAL REFLUX DISEASE, UNSPECIFIED WHETHER ESOPHAGITIS PRESENT: ICD-10-CM

## 2023-01-31 DIAGNOSIS — E89.0 POSTPROCEDURAL HYPOTHYROIDISM: ICD-10-CM

## 2023-01-31 DIAGNOSIS — Z12.5 SCREENING FOR PROSTATE CANCER: ICD-10-CM

## 2023-01-31 DIAGNOSIS — E78.2 MIXED HYPERLIPIDEMIA: ICD-10-CM

## 2023-01-31 NOTE — PATIENT INSTRUCTIONS
Here for recheck and recently tsh was elevated and med was increased and will have thyroid labs rechecked in 8 weeks and will rec low sugar diet and recheck in 6 months  Continue with low sugar and low cholesterol diet  Recheck with labs after 8/9/23 for AWV and med check

## 2023-01-31 NOTE — PROGRESS NOTES
Name: Jorge Walker  : 1945      MRN: [id number]  Encounter Provider: Renay Bee DO  Encounter Date: 2023   Encounter department: [address] [place] [address]  Chief Complaint   Patient presents with   • Follow-up     6 mo check up      Patient Instructions   Here for recheck and recently tsh was elevated and med was increased and will have thyroid labs rechecked in 8 weeks and will rec low sugar diet and recheck in 6 months  Continue with low sugar and low cholesterol diet  Recheck with labs after 23 for AWV and med check  Assessment & Plan     1  Benign essential hypertension    2  Postprocedural hypothyroidism  -     TSH, 3rd generation; Future  -     T4, free    3  Vitamin D deficiency    4  Embolism and thrombosis of tibial vein, left (HCC)  Comments:  stable on med    5  Mixed hyperlipidemia  -     Comprehensive metabolic panel; Future  -     Lipid Panel with Direct LDL reflex; Future    6  Pre-diabetes  -     Comprehensive metabolic panel; Future  -     Hemoglobin A1C    7  Screening for prostate cancer  -     PSA, Total Screen; Future    8  Paroxysmal atrial fibrillation (HCC)    9  Gastroesophageal reflux disease, unspecified whether esophagitis present  -     CBC and differential; Future           Subjective      Follow-up (6 mo check up ) No cp or sob, or ha  Review of Systems   Constitutional: Negative  HENT: Negative  Eyes: Negative  Respiratory: Negative  Cardiovascular: Negative  Gastrointestinal: Negative  Endocrine: Negative  Genitourinary: Negative  Musculoskeletal: Negative  Skin: Negative  Allergic/Immunologic: Negative  Neurological: Negative  Hematological: Negative  Psychiatric/Behavioral: Negative          Current Outpatient Medications on File Prior to Visit   Medication Sig   • ALPRAZolam (XANAX) 0 5 mg tablet Take 1 tablet (0 5 mg total) by mouth daily at bedtime as needed for anxiety   • aspirin (ECOTRIN LOW STRENGTH) 81 mg EC tablet Take 81 mg by mouth daily   • levothyroxine 125 mcg tablet Take 1 tablet (125 mcg total) by mouth daily in the early morning   • lisinopril (ZESTRIL) 20 mg tablet Take 1 tablet (20 mg total) by mouth see administration instructions Take 1 tablet daily in a m  And in the p m  P r n  Elevated blood pressure   • metoprolol succinate (TOPROL-XL) 25 mg 24 hr tablet TAKE 1 TABLET DAILY   • pravastatin (PRAVACHOL) 10 mg tablet TAKE 1 TABLET DAILY   • Xarelto 20 MG tablet TAKE 1 TABLET DAILY       Objective     /72 (BP Location: Left arm, Patient Position: Sitting, Cuff Size: Adult)   Pulse 56   Temp (!) 96 1 °F (35 6 °C) (Temporal)   Resp 16   Ht 5' 7" (1 702 m)   Wt 79 5 kg (175 lb 3 2 oz)   SpO2 98%   BMI 27 44 kg/m²     Physical Exam  Constitutional:       Appearance: Normal appearance  He is well-developed  HENT:      Head: Normocephalic and atraumatic  Right Ear: External ear normal       Left Ear: External ear normal       Nose: Nose normal    Eyes:      Conjunctiva/sclera: Conjunctivae normal       Pupils: Pupils are equal, round, and reactive to light  Cardiovascular:      Rate and Rhythm: Normal rate and regular rhythm  Heart sounds: Normal heart sounds  Pulmonary:      Effort: Pulmonary effort is normal       Breath sounds: Normal breath sounds  Musculoskeletal:         General: Normal range of motion  Cervical back: Normal range of motion and neck supple  Skin:     General: Skin is warm and dry  Capillary Refill: Capillary refill takes less than 2 seconds  Neurological:      General: No focal deficit present  Mental Status: He is alert and oriented to person, place, and time  Deep Tendon Reflexes: Reflexes are normal and symmetric     Psychiatric:         Behavior: Behavior normal        Genna Sewell DO

## 2023-03-01 ENCOUNTER — HOSPITAL ENCOUNTER (OUTPATIENT)
Dept: CT IMAGING | Facility: HOSPITAL | Age: 78
Discharge: HOME | End: 2023-03-01

## 2023-03-01 DIAGNOSIS — I71.21 ANEURYSM OF ASCENDING AORTA WITHOUT RUPTURE: ICD-10-CM

## 2023-03-14 ENCOUNTER — OFFICE VISIT (OUTPATIENT)
Dept: CARDIAC SURGERY | Facility: CLINIC | Age: 78
End: 2023-03-14

## 2023-03-14 VITALS
HEIGHT: 67 IN | OXYGEN SATURATION: 97 % | SYSTOLIC BLOOD PRESSURE: 143 MMHG | DIASTOLIC BLOOD PRESSURE: 75 MMHG | BODY MASS INDEX: 27.88 KG/M2 | WEIGHT: 177.6 LBS | HEART RATE: 61 BPM

## 2023-03-14 DIAGNOSIS — I71.21 ANEURYSM OF ASCENDING AORTA WITHOUT RUPTURE: Primary | ICD-10-CM

## 2023-03-14 NOTE — PROGRESS NOTES
Aortic Clinic  Víctor Wong  76 y o  male MRN: [id number]       Reason for Consult / Principal Problem: Ascending aortic aneurysm    History of Present Illness: Víctor Wong  is a 76y o  year old male who presents today for ongoing surveillance of his ascending aortic aneurysm  This was initially identified as an incidental finding in August 2016 and measured 4 3 x 4 5 cm in maximal diameter  Echo in 2016 & 12/22/23 demonstrates a normal trileafelt aortic valve with mild AI and normal root size  Patient has been followed in our aortic clinic since 2016 with serial imaging that has remained stable  Patient's PMHx is notable for HTN, HLD, PAF (Xarelto), SSS, 1st degree AVB, h/o DVT/PE, h/o thyroid cancer s/p total thyroidectomy (2001), hypothyroidism, pre-diabetes, arthritis and anxiety  Patient returns today for a 2 year interval follow up with repeat imaging  Upon interview today patient states he has been well since his last visit in our office  He denies chest pain, SOB, LINK, lightheadedness, palpitations, fatigue, change in activity tolerance, back ain, abdominal pain or extremity pain/wekaness/numbness  Patient remains active and exercises for an hour on his elliptical twice a day  He link snot smoke  His wife checks his BP every other day and his readings at home are generally around 130 70  He takes Metoprolol & Lisinopril  He is on statin therapy  He takes Aspirin and Xarelto for PAF (and h/o DVT/PE)  There is no FH of aneurysms      Past Medical History:  Past Medical History:   Diagnosis Date   • A-fib ([facility] ) 11/2002   • Anxiety 01/15/2002   • Aortic aneurysm ([facility] ) 08/26/2016   • Arthritis     neck   • Atrial fibrillation ([facility] )     last assessed: 7/26/2017   • Cancer (HCC)     skin, thyroid    • Disease of thyroid gland    • Disorder of epididymis    • H/O malignant neoplasm of thyroid    • Hyperlipidemia     last assessed: 6/29/2017   • Hypertension    • Left leg DVT ([facility] ) 03/07/2017 • Osteopenia    • Psychiatric disorder    • Pulmonary embolism ([facility] )     last assessed: 2017         Past Surgical History:   Past Surgical History:   Procedure Laterality Date   • APPENDECTOMY     • BASAL CELL CARCINOMA EXCISION      neck   • CATARACT EXTRACTION  2022    right eye   • CATARACT EXTRACTION  2022    left eye   • COLONOSCOPY  2021    complete   • CYSTOSCOPY  2016    diagnostic   • LEG SURGERY Left     quadricep   • SQUAMOUS CELL CARCINOMA EXCISION      chest area, right leg   • TOTAL THYROIDECTOMY  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         Family History:  Family History   Problem Relation Age of Onset   • Coronary artery disease Father    • Heart attack Father         acute myocardial infarction   • Stroke Mother    • Hypertension Mother          Social History:    Social History     Substance and Sexual Activity   Alcohol Use No     Social History     Substance and Sexual Activity   Drug Use No     Social History     Tobacco Use   Smoking Status Former   • Types: Cigarettes   • Quit date:    • Years since quittin 2   Smokeless Tobacco Never         Home Medications:   Prior to Admission medications    Medication Sig Start Date End Date Taking? Authorizing Provider   ALPRAZolam Pretty Lantigua) 0 5 mg tablet Take 1 tablet (0 5 mg total) by mouth daily at bedtime as needed for anxiety 23   Harpal Saucedo,    aspirin (ECOTRIN LOW STRENGTH) 81 mg EC tablet Take 81 mg by mouth daily    Historical Provider, MD   levothyroxine 125 mcg tablet Take 1 tablet (125 mcg total) by mouth daily in the early morning 23   Harpal Saucedo DO   lisinopril (ZESTRIL) 20 mg tablet Take 1 tablet (20 mg total) by mouth see administration instructions Take 1 tablet daily in a m  And in the p m  P r n   Elevated blood pressure 22   Harpal Saucedo DO   metoprolol succinate (TOPROL-XL) 25 mg 24 hr tablet TAKE 1 TABLET DAILY 10/5/22   Harpal Saucedo DO   pravastatin (PRAVACHOL) 10 mg tablet TAKE 1 TABLET DAILY 5/13/22   Kathleen Noyola DO   Xarelto 20 MG tablet TAKE 1 TABLET DAILY 11/11/22   Kathleen Noyola DO       Allergies: Allergies   Allergen Reactions   • Percocet [Oxycodone-Acetaminophen] Seizures   • Penicillins      "nearly passed out"   • Percocet [Oxycodone-Acetaminophen] Seizures       Review of Systems:   Review of Systems - History obtained from chart review and the patient  General ROS: negative  Psychological ROS: negative  Ophthalmic ROS: positive for - uses glasses  ENT ROS: negative  Allergy and Immunology ROS: negative  Hematological and Lymphatic ROS: negative  Endocrine ROS: negative  Breast ROS: negative  Respiratory ROS: no cough, shortness of breath, or wheezing  Cardiovascular ROS: no chest pain or dyspnea on exertion  Gastrointestinal ROS: no abdominal pain, change in bowel habits, or black or bloody stools  Genito-Urinary ROS: no dysuria, trouble voiding, or hematuria  Musculoskeletal ROS: negative  Neurological ROS: no TIA or stroke symptoms  Dermatological ROS: negative    Vital Signs:   Vitals:    03/14/23 1002 03/14/23 1005   BP: 139/75 143/75   BP Location: Left arm Right arm   Patient Position: Sitting Sitting   Cuff Size: Standard Standard   Pulse: 61    SpO2: 97%    Weight: 80 6 kg (177 lb 9 6 oz)    Height: 5' 7" (1 702 m)        Physical Exam:  General: Alert, oriented, well developed, no acute distress  HEENT/NECK:  PERRLA  No jugular venous distention  Cardiac:Regular rate and rhythm, no murmurs rubs or gallops  Carotid arteries: 2+ pulses, no bruits  Pulmonary:  Breath sounds clear bilaterally  Abdomen:  Non-tender, Non-distended  Positive bowel sounds  Upper extremities: 2+ radial pulses; brisk capillary refill  Lower extremities: Extremities warm/dry  PT/DP pulses 2+ bilaterally  No edema B/L  Neuro: Alert and oriented X 3  Sensation is grossly intact  No focal deficits    Musculoskeletal: MAEE, stable gait  Skin: Warm/Dry, without rashes or lesions  Lab Results:   Lab Results   Component Value Date     09/08/2015    SODIUM 131 (L) 01/24/2023    K 4 7 01/24/2023    CL 98 01/24/2023    CO2 27 01/24/2023    ANIONGAP 8 09/08/2015    AGAP 6 01/24/2023    BUN 15 01/24/2023    CREATININE 0 82 01/24/2023    GLUC 103 11/15/2020    GLUF 97 01/24/2023    CALCIUM 8 8 01/24/2023    AST 27 01/24/2023    ALT 27 01/24/2023    ALKPHOS 61 01/24/2023    PROT 7 0 09/08/2015    TP 7 3 01/24/2023    BILITOT 1 21 (H) 09/08/2015    TBILI 1 46 (H) 01/24/2023    EGFR 84 01/24/2023     Lab Results   Component Value Date    CHOLESTEROL 142 07/26/2022    CHOLESTEROL 155 06/01/2021    CHOLESTEROL 143 06/16/2020     Lab Results   Component Value Date    HDL 54 07/26/2022    HDL 54 06/01/2021    HDL 75 06/16/2020     Lab Results   Component Value Date    TRIG 32 07/26/2022    TRIG 25 06/01/2021    TRIG 25 06/16/2020     Lab Results   Component Value Date    NONHDLC 71 05/03/2018    [place] 89 02/18/2014       Lab Results   Component Value Date    HGBA1C 5 7 (H) 01/24/2023         Imaging Studies:     CT Chest: 3/1/23    No significant change of 4 5 cm ascending aortic aneurysm and 4 4 cm aneurysm of the distal aortic arch  Echocardiogram: 12/13/22    •  Left Ventricle: Left ventricular cavity size is normal  Wall thickness is increased  There is mild to moderate asymmetric hypertrophy of the basal septal wall  The left ventricular ejection fraction is 65%  Systolic function is normal  Wall motion is normal  Diastolic function is mildly abnormal, consistent with grade I (abnormal) relaxation  •  Left Atrium: The atrium is moderately dilated  •  Right Atrium: The atrium is mild to moderately dilated  •  Aortic Valve: There is mild regurgitation  •  Mitral Valve: There is mild to moderate regurgitation  •  Tricuspid Valve: There is mild to moderate regurgitation  •  Aorta: The aortic root is normal in size   The ascending aorta is mildly dilated  The ascending aorta is 4 cm      Findings    Left Ventricle Left ventricular cavity size is normal  Wall thickness is increased  There is mild to moderate asymmetric hypertrophy of the basal septal wall  The left ventricular ejection fraction is 65%  Systolic function is normal   Wall motion is normal  Diastolic function is mildly abnormal, consistent with grade I (abnormal) relaxation  Right Ventricle Right ventricular cavity size is normal  Systolic function is normal  Wall thickness is normal    Left Atrium The atrium is moderately dilated  Right Atrium The atrium is mild to moderately dilated  Aortic Valve The aortic valve is trileaflet  The leaflets are mildly thickened  The leaflets are not calcified  The leaflets exhibit normal mobility  There is mild regurgitation  The aortic valve has no significant stenosis  Mitral Valve Mitral valve structure is normal  The leaflets are not thickened  The leaflets are not calcified  The leaflets exhibit normal mobility  There is mild to moderate regurgitation  There is no evidence of stenosis  Tricuspid Valve Tricuspid valve structure is normal  There is mild to moderate regurgitation  There is no evidence of stenosis  The right ventricular systolic pressure is upper normal  The estimated right ventricular systolic pressure is 00 53 mmHg  Pulmonic Valve Pulmonic valve structure is normal  There is no evidence of regurgitation  There is no evidence of stenosis  Ascending Aorta The aortic root is normal in size  The ascending aorta is mildly dilated  The ascending aorta is 4 cm  IVC/SVC The right atrial pressure is estimated at 5 0 mmHg  The inferior vena cava is normal in size  Pericardium There is no pericardial effusion  The pericardium is normal in appearance       Left Ventricle Measurements    Function/Volumes   A4C EF 62 %         LVOT stroke volume 109 12          LVOT stroke volume index 56 2 ml/m2         Dimensions   LVIDd 4 5 cm LVIDS 3 cm         IVSd 1 4 cm         LVPWd 1 2 cm         LVOT area 3 14 cm2         FS 33          Diastolic Filling   E wave deceleration time 199 ms         MV Peak E Edwin 62 cm/s         MV Peak A Edwin 0 8 m/s          Report Measurements   AV LVOT peak gradient 7 mmHg              Interventricular Septum Measurements    Shunt Ratio   LVOT peak VTI 34 75 cm         LVOT peak edwin 1 28 m/s              Right Ventricle Measurements    Dimensions   RVID d 4 8 cm               Left Atrium Measurements    Dimensions   LA size 3 7 cm         LA length (A2C) 5 9 cm         GARCÍA A4C 25 6 cm2               Right Atrium Measurements    Dimensions   RAA A4C 23 cm2               Atrial Septum Measurements    Shunt Ratio   LVOT peak VTI 34 75 cm         LVOT peak edwin 1 28 m/s               Aortic Valve Measurements    Stenosis   Aortic valve peak velocity 1 41 m/s         LVOT peak edwin 1 28 m/s         Ao VTI 36 18 cm         LVOT peak VTI 34 75 cm         AV mean gradient 4 mmHg         LVOT mn grad 5 mmHg         AV peak gradient 8 mmHg         AV LVOT peak gradient 7 mmHg         Area/Dimensions   DVI 0 91          AV valve area 3 02 cm2         AV area by cont VTI 3 cm2         AV area peak edwin 2 9 cm2         LVOT diameter 2 cm         LVOT area 3 14 cm2               Mitral Valve Measurements    Stenosis   MV stenosis pressure 1/2 time 58 ms         MV valve area p 1/2 method 3 79                Tricuspid Valve Measurements    RVSP Parameters   TR Peak Edwin 2 8 m/s         Est  RA pres 5 mmHg         Triscuspid Valve Regurgitation Peak Gradient 31 mmHg         Right Ventricular Peak Systolic Pressure 36 mmHg               Aorta Measurements    Aortic Dimensions   Ao root 3 6 cm         Asc Ao 4 cm               IVC/SVC Measurements    IVC/SVC   Est  RA pres 5 mmHg               I have personally reviewed pertinent films in PACS     PCP and Cardiology notes reviewed      Assessment:  Patient Active Problem List Diagnosis Date Noted   • Traumatic rupture of quadriceps tendon 11/09/2020   • Other pulmonary embolism without acute cor pulmonale ([place] [address] ) 06/24/2020   • Hematospermia 01/06/2020   • Pulmonary nodules 03/27/2017   • Embolism and thrombosis of tibial vein, left ([place] [address] ) 03/13/2017   • Aneurysm, ascending aorta 09/01/2016   • Thyroid cancer ([place] [address] ) 02/12/2014   • Anxiety 12/17/2013   • Sick sinus syndrome ([place] [address] ) 07/03/2013   • Paroxysmal atrial fibrillation ([place] [address] ) 07/02/2013   • Pre-diabetes 05/17/2013   • Vitamin D deficiency 04/05/2013   • Postprocedural hypothyroidism 02/19/2013   • Esophageal reflux 08/17/2012   • Osteoporosis 06/08/2012   • Benign essential hypertension 05/03/2012   • Hyperlipidemia 05/03/2012       Impression/Plan:    Ascending aortic aneurysm- 4 5 cm- stable since 2016    CT imaging performed prior to this visit demonstrates the ascending aorta measuring 4 5 cm in size at its greatest diameter  Recent echo is stable as well with trileaflet aortic valve, normal root diameter and mild AI (unchaged form echo in 2019)  These findings were confirmed and shared with the patient today  Patient remains asymptomatic, normotensive and no contributing FH  His imaging has remained unchanged over the past 7 years and at the current size of his ascending aorta (4 5cm), he does not meet surgical criteria (5 5cm)  Patient is 69yo and with advanced age comes a higher surgical risk vs benefit with an open surgical repair  Patient and I discussed the option of no further imaging/aortic clinic f/u vs one more CT scan in 3 years at which time he will be 77 yo  He agrees with the plan with the option to cancel in 2026 when it is time for f/u  Noe Posadas  was comfortable with our recommendations, and his questions were answered to his satisfaction  Aortic Aneurysm Instructions were provided to the patient as follows:    1  No lifting more than 50 pounds   Regular aerobic exercise permitted and recommended  2  Maintain a controlled blood pressure with a goal of less than 120/80  3  Follow up in Aortic Clinic as recommended with radiology follow up as instructed  4  Report to the ER or call 911 immediately with the following signs / symptoms: sudden onset of back pain, chest pain or shortness of breath  The patient recently had a screening colonoscopy in 8/17/21  Therefore GI referral is not indicated at this time       SIGNATURE: WILLIAM Leon  DATE: March 14, 2023  TIME: 10:31 AM

## 2023-03-29 ENCOUNTER — APPOINTMENT (OUTPATIENT)
Dept: LAB | Facility: CLINIC | Age: 78
End: 2023-03-29

## 2023-03-29 DIAGNOSIS — E03.9 HYPOTHYROIDISM, UNSPECIFIED TYPE: ICD-10-CM

## 2023-03-29 DIAGNOSIS — E89.0 POSTPROCEDURAL HYPOTHYROIDISM: ICD-10-CM

## 2023-03-29 DIAGNOSIS — K21.9 GASTROESOPHAGEAL REFLUX DISEASE, UNSPECIFIED WHETHER ESOPHAGITIS PRESENT: ICD-10-CM

## 2023-03-29 DIAGNOSIS — Z12.5 SCREENING FOR PROSTATE CANCER: ICD-10-CM

## 2023-03-29 DIAGNOSIS — R73.03 PRE-DIABETES: ICD-10-CM

## 2023-03-29 DIAGNOSIS — E78.2 MIXED HYPERLIPIDEMIA: ICD-10-CM

## 2023-03-29 LAB
ALBUMIN SERPL BCP-MCNC: 3.9 G/DL (ref 3.5–5)
ALP SERPL-CCNC: 58 U/L (ref 46–116)
ALT SERPL W P-5'-P-CCNC: 24 U/L (ref 12–78)
ANION GAP SERPL CALCULATED.3IONS-SCNC: 4 MMOL/L (ref 4–13)
AST SERPL W P-5'-P-CCNC: 26 U/L (ref 5–45)
BASOPHILS # BLD AUTO: 0.05 THOUSANDS/ÂΜL (ref 0–0.1)
BASOPHILS NFR BLD AUTO: 1 % (ref 0–1)
BILIRUB SERPL-MCNC: 1.53 MG/DL (ref 0.2–1)
BUN SERPL-MCNC: 16 MG/DL (ref 5–25)
CALCIUM SERPL-MCNC: 8.8 MG/DL (ref 8.3–10.1)
CHLORIDE SERPL-SCNC: 103 MMOL/L (ref 96–108)
CHOLEST SERPL-MCNC: 136 MG/DL
CO2 SERPL-SCNC: 24 MMOL/L (ref 21–32)
CREAT SERPL-MCNC: 0.76 MG/DL (ref 0.6–1.3)
EOSINOPHIL # BLD AUTO: 0.08 THOUSAND/ÂΜL (ref 0–0.61)
EOSINOPHIL NFR BLD AUTO: 1 % (ref 0–6)
ERYTHROCYTE [DISTWIDTH] IN BLOOD BY AUTOMATED COUNT: 13.2 % (ref 11.6–15.1)
GFR SERPL CREATININE-BSD FRML MDRD: 86 ML/MIN/1.73SQ M
GLUCOSE P FAST SERPL-MCNC: 96 MG/DL (ref 65–99)
HCT VFR BLD AUTO: 42.5 % (ref 36.5–49.3)
HDLC SERPL-MCNC: 57 MG/DL
HGB BLD-MCNC: 14.2 G/DL (ref 12–17)
IMM GRANULOCYTES # BLD AUTO: 0.01 THOUSAND/UL (ref 0–0.2)
IMM GRANULOCYTES NFR BLD AUTO: 0 % (ref 0–2)
LDLC SERPL CALC-MCNC: 71 MG/DL (ref 0–100)
LYMPHOCYTES # BLD AUTO: 2.27 THOUSANDS/ÂΜL (ref 0.6–4.47)
LYMPHOCYTES NFR BLD AUTO: 33 % (ref 14–44)
MCH RBC QN AUTO: 31.9 PG (ref 26.8–34.3)
MCHC RBC AUTO-ENTMCNC: 33.4 G/DL (ref 31.4–37.4)
MCV RBC AUTO: 96 FL (ref 82–98)
MONOCYTES # BLD AUTO: 0.49 THOUSAND/ÂΜL (ref 0.17–1.22)
MONOCYTES NFR BLD AUTO: 7 % (ref 4–12)
NEUTROPHILS # BLD AUTO: 4.09 THOUSANDS/ÂΜL (ref 1.85–7.62)
NEUTS SEG NFR BLD AUTO: 58 % (ref 43–75)
NRBC BLD AUTO-RTO: 0 /100 WBCS
PLATELET # BLD AUTO: 221 THOUSANDS/UL (ref 149–390)
PMV BLD AUTO: 10 FL (ref 8.9–12.7)
POTASSIUM SERPL-SCNC: 4.7 MMOL/L (ref 3.5–5.3)
PROT SERPL-MCNC: 6.9 G/DL (ref 6.4–8.4)
PSA SERPL-MCNC: 1.5 NG/ML (ref 0–4)
RBC # BLD AUTO: 4.45 MILLION/UL (ref 3.88–5.62)
SODIUM SERPL-SCNC: 131 MMOL/L (ref 135–147)
T4 FREE SERPL-MCNC: 1.18 NG/DL (ref 0.76–1.46)
TRIGL SERPL-MCNC: 40 MG/DL
TSH SERPL DL<=0.05 MIU/L-ACNC: 4.25 UIU/ML (ref 0.45–4.5)
WBC # BLD AUTO: 6.99 THOUSAND/UL (ref 4.31–10.16)

## 2023-03-30 LAB
EST. AVERAGE GLUCOSE BLD GHB EST-MCNC: 111 MG/DL
HBA1C MFR BLD: 5.5 %

## 2023-04-24 DIAGNOSIS — E03.9 HYPOTHYROIDISM, UNSPECIFIED TYPE: ICD-10-CM

## 2023-04-24 RX ORDER — LEVOTHYROXINE SODIUM 0.12 MG/1
125 TABLET ORAL
Qty: 10 TABLET | Refills: 0 | Status: SHIPPED | OUTPATIENT
Start: 2023-04-24

## 2023-05-08 DIAGNOSIS — E78.2 MIXED HYPERLIPIDEMIA: ICD-10-CM

## 2023-05-08 RX ORDER — PRAVASTATIN SODIUM 10 MG
TABLET ORAL
Qty: 90 TABLET | Refills: 3 | Status: SHIPPED | OUTPATIENT
Start: 2023-05-08

## 2023-07-31 DIAGNOSIS — E03.9 HYPOTHYROIDISM, UNSPECIFIED TYPE: ICD-10-CM

## 2023-07-31 RX ORDER — LEVOTHYROXINE SODIUM 0.12 MG/1
125 TABLET ORAL
Qty: 90 TABLET | Refills: 0 | Status: SHIPPED | OUTPATIENT
Start: 2023-07-31

## 2023-07-31 RX ORDER — LEVOTHYROXINE SODIUM 0.12 MG/1
125 TABLET ORAL
Qty: 10 TABLET | Refills: 0 | Status: SHIPPED | OUTPATIENT
Start: 2023-07-31 | End: 2023-07-31 | Stop reason: SDUPTHER

## 2023-08-07 ENCOUNTER — RA CDI HCC (OUTPATIENT)
Dept: OTHER | Facility: HOSPITAL | Age: 78
End: 2023-08-07

## 2023-08-07 NOTE — PROGRESS NOTES
I49.5  [address] [facility] coding opportunities          Chart Reviewed number of suggestions sent to Provider: 1     Patients Insurance     Medicare Insurance: [address] [place] [address]

## 2023-08-16 ENCOUNTER — OFFICE VISIT (OUTPATIENT)
Dept: FAMILY MEDICINE CLINIC | Facility: CLINIC | Age: 78
End: 2023-08-16
Payer: COMMERCIAL

## 2023-08-16 VITALS
DIASTOLIC BLOOD PRESSURE: 74 MMHG | BODY MASS INDEX: 25.31 KG/M2 | RESPIRATION RATE: 16 BRPM | OXYGEN SATURATION: 97 % | TEMPERATURE: 96.3 F | SYSTOLIC BLOOD PRESSURE: 130 MMHG | WEIGHT: 167 LBS | HEIGHT: 68 IN | HEART RATE: 55 BPM

## 2023-08-16 DIAGNOSIS — E55.9 VITAMIN D DEFICIENCY: ICD-10-CM

## 2023-08-16 DIAGNOSIS — C73 THYROID CANCER (HCC): ICD-10-CM

## 2023-08-16 DIAGNOSIS — R73.03 PRE-DIABETES: ICD-10-CM

## 2023-08-16 DIAGNOSIS — I10 BENIGN ESSENTIAL HYPERTENSION: ICD-10-CM

## 2023-08-16 DIAGNOSIS — I71.21 ANEURYSM OF ASCENDING AORTA WITHOUT RUPTURE (HCC): ICD-10-CM

## 2023-08-16 DIAGNOSIS — E78.2 MIXED HYPERLIPIDEMIA: ICD-10-CM

## 2023-08-16 DIAGNOSIS — Z12.5 SCREENING FOR PROSTATE CANCER: ICD-10-CM

## 2023-08-16 DIAGNOSIS — I48.0 PAROXYSMAL ATRIAL FIBRILLATION (HCC): ICD-10-CM

## 2023-08-16 DIAGNOSIS — F41.9 ANXIETY: ICD-10-CM

## 2023-08-16 DIAGNOSIS — E03.9 HYPOTHYROIDISM, UNSPECIFIED TYPE: Primary | ICD-10-CM

## 2023-08-16 DIAGNOSIS — E89.0 POSTPROCEDURAL HYPOTHYROIDISM: ICD-10-CM

## 2023-08-16 DIAGNOSIS — I26.99 OTHER PULMONARY EMBOLISM WITHOUT ACUTE COR PULMONALE, UNSPECIFIED CHRONICITY (HCC): ICD-10-CM

## 2023-08-16 PROCEDURE — G0439 PPPS, SUBSEQ VISIT: HCPCS | Performed by: FAMILY MEDICINE

## 2023-08-16 PROCEDURE — 99214 OFFICE O/P EST MOD 30 MIN: CPT | Performed by: FAMILY MEDICINE

## 2023-08-16 RX ORDER — LEVOTHYROXINE SODIUM 0.12 MG/1
125 TABLET ORAL
Qty: 90 TABLET | Refills: 3 | Status: SHIPPED | OUTPATIENT
Start: 2023-08-16

## 2023-08-16 RX ORDER — ALPRAZOLAM 0.5 MG/1
0.5 TABLET ORAL
Qty: 90 TABLET | Refills: 1 | Status: SHIPPED | OUTPATIENT
Start: 2023-08-16

## 2023-08-16 NOTE — PROGRESS NOTES
Assessment and Plan:     Problem List Items Addressed This Visit        Endocrine    Postprocedural hypothyroidism    Thyroid cancer ([address])       Cardiovascular and Mediastinum    Aneurysm, ascending aorta (HCC)    Benign essential hypertension    Paroxysmal atrial fibrillation (HCC)    Other pulmonary embolism without acute cor pulmonale (HCC)       Other    Anxiety    Hyperlipidemia    Vitamin D deficiency    Pre-diabetes   Other Visit Diagnoses     Hypothyroidism, unspecified type    -  Primary    Screening for prostate cancer               Preventive health issues were discussed with patient, and age appropriate screening tests were ordered as noted in patient's After Visit Summary. Personalized health advice and appropriate referrals for health education or preventive services given if needed, as noted in patient's After Visit Summary. History of Present Illness:     Patient presents for a Medicare Wellness Visit    Medicare Wellness Visit (Pt needs refills on his med )Home is safe and also advanced directives and living will is UTD and needs refills on meds. Patient Care Team:  Aniceto Hester DO as PCP - Daphnie Childs MD as PCP - Endocrinology (Endocrinology)  MD Essie Flowers MD Delois Ropes, MD     Review of Systems:     Review of Systems   Constitutional: Negative. HENT: Negative. Eyes: Negative. Respiratory: Negative. Cardiovascular: Negative. Gastrointestinal: Negative. Endocrine: Negative. Genitourinary: Negative. Musculoskeletal: Negative. Skin: Negative. Allergic/Immunologic: Negative. Neurological: Negative. Hematological: Negative.     Psychiatric/Behavioral:        Anxiety and stress from taking care of wife who has many medical issues        Problem List:     Patient Active Problem List   Diagnosis   • Aneurysm, ascending aorta (HCC)   • Anxiety   • Benign essential hypertension   • Esophageal reflux   • Hyperlipidemia   • Paroxysmal atrial fibrillation (HCC)   • Postprocedural hypothyroidism   • Pulmonary nodules   • Sick sinus syndrome (HCC)   • Thyroid cancer (HCC)   • Vitamin D deficiency   • Pre-diabetes   • Osteoporosis   • Embolism and thrombosis of tibial vein, left (HCC)   • Hematospermia   • Other pulmonary embolism without acute cor pulmonale (HCC)   • Traumatic rupture of quadriceps tendon      Past Medical and Surgical History:     Past Medical History:   Diagnosis Date   • A-fib ([address]) 11/2002   • Anxiety 01/15/2002   • Aortic aneurysm ([address]) 08/26/2016   • Arthritis     neck   • Atrial fibrillation (HCC)     last assessed: 7/26/2017   • Cancer (HCC)     skin, thyroid    • Disease of thyroid gland    • Disorder of epididymis    • H/O malignant neoplasm of thyroid    • Hyperlipidemia     last assessed: 6/29/2017   • Hypertension    • Left leg DVT ([address]) 03/07/2017   • Osteopenia    • Psychiatric disorder    • Pulmonary embolism ([address])     last assessed: 7/26/2017     Past Surgical History:   Procedure Laterality Date   • APPENDECTOMY     • BASAL CELL CARCINOMA EXCISION      neck   • CATARACT EXTRACTION  03/23/2022    right eye   • CATARACT EXTRACTION  04/13/2022    left eye   • COLONOSCOPY  08/17/2021    complete   • CYSTOSCOPY  02/01/2016    diagnostic   • LEG SURGERY Left     quadricep   • SQUAMOUS CELL CARCINOMA EXCISION      chest area, right leg   • 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      Family History:     Family History   Problem Relation Age of Onset   • Coronary artery disease Father    • Heart attack Father         acute myocardial infarction   • Stroke Mother    • Hypertension Mother       Social History:     Social History     Socioeconomic History   • Marital status: /Civil Union     Spouse name: None   • Number of children: None   • Years of education: None   • Highest education level: None   Occupational History   • None Tobacco Use   • Smoking status: Former     Types: Cigarettes     Quit date: 1967     Years since quittin.7   • Smokeless tobacco: Never   Vaping Use   • Vaping Use: Never used   Substance and Sexual Activity   • Alcohol use: No   • Drug use: No   • Sexual activity: None   Other Topics Concern   • None   Social History Narrative    Does not consume caffeine     Social Determinants of Health     Financial Resource Strain: Low Risk  (2023)    Overall Financial Resource Strain (CARDIA)    • Difficulty of Paying Living Expenses: Not hard at all   Food Insecurity: Not on file   Transportation Needs: No Transportation Needs (2023)    PRAPARE - Transportation    • Lack of Transportation (Medical): No    • Lack of Transportation (Non-Medical): No   Physical Activity: Not on file   Stress: Not on file   Social Connections: Not on file   Intimate Partner Violence: Not on file   Housing Stability: Not on file      Medications and Allergies:     Current Outpatient Medications   Medication Sig Dispense Refill   • ALPRAZolam (XANAX) 0.5 mg tablet Take 1 tablet (0.5 mg total) by mouth daily at bedtime as needed for anxiety 90 tablet 0   • aspirin (ECOTRIN LOW STRENGTH) 81 mg EC tablet Take 81 mg by mouth daily     • levothyroxine 125 mcg tablet Take 1 tablet (125 mcg total) by mouth daily in the early morning 90 tablet 0   • lisinopril (ZESTRIL) 20 mg tablet Take 1 tablet (20 mg total) by mouth see administration instructions Take 1 tablet daily in a.m. And in the p.m. P.r.n. Elevated blood pressure 180 tablet 3   • metoprolol succinate (TOPROL-XL) 25 mg 24 hr tablet TAKE 1 TABLET DAILY 90 tablet 3   • pravastatin (PRAVACHOL) 10 mg tablet TAKE 1 TABLET DAILY 90 tablet 3   • Xarelto 20 MG tablet TAKE 1 TABLET DAILY 90 tablet 3     No current facility-administered medications for this visit.      Allergies   Allergen Reactions   • Percocet [Oxycodone-Acetaminophen] Seizures   • Penicillins      "nearly passed out"   • Percocet [Oxycodone-Acetaminophen] Seizures      Immunizations:     Immunization History   Administered Date(s) Administered   • COVID-19 MODERNA VACC 0.5 ML IM 01/29/2021, 02/25/2021, 11/01/2021, 04/15/2022   • COVID-19 Moderna Vac BIVALENT 12 Yr+ IM (BOOSTER ONLY) 0.5 ML 10/12/2022   • H1N1, All Formulations 01/08/2010   • INFLUENZA 12/08/2004, 09/29/2015, 09/16/2016, 10/04/2017, 09/14/2018, 09/19/2019, 09/24/2020, 10/12/2021, 09/27/2022   • Influenza Split High Dose Preservative Free IM 09/29/2015, 09/16/2016, 10/04/2017   • Influenza, high dose seasonal 0.7 mL 09/19/2019, 09/24/2020   • Pneumococcal 02/09/2011   • Pneumococcal Conjugate 13-Valent 11/19/2018   • Pneumococcal Conjugate PCV 7 02/09/2011      Health Maintenance:         Topic Date Due   • Hepatitis C Screening  Never done         Topic Date Due   • Pneumococcal Vaccine: 65+ Years (2 - PPSV23 if available, else PCV20) 11/19/2019   • COVID-19 Vaccine (6 - Moderna series) 02/12/2023   • Influenza Vaccine (1) 09/01/2023      Medicare Screening Tests and Risk Assessments:     Ashanti Isidro is here for his Subsequent Wellness visit. Health Risk Assessment:   Patient rates overall health as very good. Patient feels that their physical health rating is same. Patient is satisfied with their life. Eyesight was rated as same. Hearing was rated as same. Patient feels that their emotional and mental health rating is slightly worse. Patients states they are never, rarely angry. Patient states they are never, rarely unusually tired/fatigued. Pain experienced in the last 7 days has been none. Patient states that he has experienced no weight loss or gain in last 6 months. Takes care of wife    Fall Risk Screening: In the past year, patient has experienced: no history of falling in past year      Home Safety:  Patient does not have trouble with stairs inside or outside of their home. Patient has working smoke alarms and has working carbon monoxide detector.  Home safety hazards include: none. Nutrition:   Current diet is Regular. Medications:   Patient is currently taking over-the-counter supplements. OTC medications include: Fish oill. Patient is able to manage medications. Activities of Daily Living (ADLs)/Instrumental Activities of Daily Living (IADLs):   Walk and transfer into and out of bed and chair?: Yes  Dress and groom yourself?: Yes    Bathe or shower yourself?: Yes    Feed yourself? Yes  Do your laundry/housekeeping?: Yes  Manage your money, pay your bills and track your expenses?: Yes  Make your own meals?: Yes    Do your own shopping?: Yes    Previous Hospitalizations:   Any hospitalizations or ED visits within the last 12 months?: No      Advance Care Planning:   Living will: Yes    Durable POA for healthcare: Yes    Advanced directive: Yes      PREVENTIVE SCREENINGS      Cardiovascular Screening:    General: Screening Not Indicated and History Lipid Disorder      Diabetes Screening:     General: Screening Current      Prostate Cancer Screening:    General: Screening Not Indicated      Osteoporosis Screening:    General: Screening Not Indicated and History Osteoporosis      Abdominal Aortic Aneurysm (AAA) Screening:    Risk factors include: tobacco use        Lung Cancer Screening:     General: Screening Not Indicated    Screening, Brief Intervention, and Referral to Treatment (SBIRT)    Screening  Typical number of drinks in a day: 0  Typical number of drinks in a week: 0  Interpretation: Low risk drinking behavior.     AUDIT-C Screenin) How often did you have a drink containing alcohol in the past year? never  2) How many drinks did you have on a typical day when you were drinking in the past year? 0  3) How often did you have 6 or more drinks on one occasion in the past year? never    AUDIT-C Score: 0  Interpretation: Score 0-3 (male): Negative screen for alcohol misuse    Single Item Drug Screening:  How often have you used an illegal drug (including marijuana) or a prescription medication for non-medical reasons in the past year? never    Single Item Drug Screen Score: 0  Interpretation: Negative screen for possible drug use disorder    No results found. Physical Exam:     /74 (BP Location: Left arm, Patient Position: Sitting, Cuff Size: Large)   Pulse 55   Temp (!) 96.3 °F (35.7 °C) (Temporal)   Resp 16   Ht 5' 8" (1.727 m)   Wt 75.8 kg (167 lb)   SpO2 97%   BMI 25.39 kg/m²     Physical Exam  Vitals and nursing note reviewed. Constitutional:       Appearance: Normal appearance. He is well-developed. HENT:      Head: Normocephalic and atraumatic. Eyes:      Conjunctiva/sclera: Conjunctivae normal.   Cardiovascular:      Rate and Rhythm: Normal rate and regular rhythm. Pulses: Normal pulses. Heart sounds: Normal heart sounds. Pulmonary:      Effort: Pulmonary effort is normal.      Breath sounds: Normal breath sounds. Musculoskeletal:         General: Normal range of motion. Cervical back: Neck supple. Skin:     General: Skin is warm and dry. Capillary Refill: Capillary refill takes less than 2 seconds. Neurological:      General: No focal deficit present. Mental Status: He is alert and oriented to person, place, and time. Psychiatric:         Mood and Affect: Mood normal.         Behavior: Behavior normal.         Thought Content:  Thought content normal.         Judgment: Judgment normal.      Comments: Increased stress with care of wife          Campos Rodney, DO

## 2023-08-16 NOTE — PATIENT INSTRUCTIONS
Here for recheck and AWV is UTD and home is safe and advanced directives and living will is UTD . Rec recheck in 6-8 months with labs. Continue with low sugar and low cholesterol diet. Refilled Xanax and levothyroxine. Stress reduction as directed to help with anxiety. Recheck labs for hx of prediabetes and thyroid issues and HTN and aneurysm of ascending aorta and a-fib and sees Cardiology and cardiothoracic surgery as directed. Medicare Preventive Visit Patient Instructions  Thank you for completing your Welcome to Medicare Visit or Medicare Annual Wellness Visit today. Your next wellness visit will be due in one year (8/16/2024). The screening/preventive services that you may require over the next 5-10 years are detailed below. Some tests may not apply to you based off risk factors and/or age. Screening tests ordered at today's visit but not completed yet may show as past due. Also, please note that scanned in results may not display below. Preventive Screenings:  Service Recommendations Previous Testing/Comments   Colorectal Cancer Screening  Colonoscopy    Fecal Occult Blood Test (FOBT)/Fecal Immunochemical Test (FIT)  Fecal DNA/Cologuard Test  Flexible Sigmoidoscopy Age: 43-73 years old   Colonoscopy: every 10 years (May be performed more frequently if at higher risk)  OR  FOBT/FIT: every 1 year  OR  Cologuard: every 3 years  OR  Sigmoidoscopy: every 5 years  Screening may be recommended earlier than age 39 if at higher risk for colorectal cancer. Also, an individualized decision between you and your healthcare provider will decide whether screening between the ages of 77-80 would be appropriate.  Colonoscopy: 08/17/2021  FOBT/FIT: Not on file  Cologuard: Not on file  Sigmoidoscopy: Not on file          Prostate Cancer Screening Individualized decision between patient and health care provider in men between ages of 53-66   Medicare will cover every 12 months beginning on the day after your 50th birthday PSA: 1.5 ng/mL           Hepatitis C Screening Once for adults born between 1945 and 1965  More frequently in patients at high risk for Hepatitis C Hep C Antibody: Not on file        Diabetes Screening 1-2 times per year if you're at risk for diabetes or have pre-diabetes Fasting glucose: 96 mg/dL (3/29/2023)  A1C: 5.5 % (3/29/2023)      Cholesterol Screening Once every 5 years if you don't have a lipid disorder. May order more often based on risk factors. Lipid panel: 03/29/2023         Other Preventive Screenings Covered by Medicare:  Abdominal Aortic Aneurysm (AAA) Screening: covered once if your at risk. You're considered to be at risk if you have a family history of AAA or a male between the age of 70-76 who smoking at least 100 cigarettes in your lifetime. Lung Cancer Screening: covers low dose CT scan once per year if you meet all of the following conditions: (1) Age 48-67; (2) No signs or symptoms of lung cancer; (3) Current smoker or have quit smoking within the last 15 years; (4) You have a tobacco smoking history of at least 20 pack years (packs per day x number of years you smoked); (5) You get a written order from a healthcare provider. Glaucoma Screening: covered annually if you're considered high risk: (1) You have diabetes OR (2) Family history of glaucoma OR (3)  aged 48 and older OR (3)  American aged 72 and older  Osteoporosis Screening: covered every 2 years if you meet one of the following conditions: (1) Have a vertebral abnormality; (2) On glucocorticoid therapy for more than 3 months; (3) Have primary hyperparathyroidism; (4) On osteoporosis medications and need to assess response to drug therapy. HIV Screening: covered annually if you're between the age of 14-79. Also covered annually if you are younger than 13 and older than 72 with risk factors for HIV infection. For pregnant patients, it is covered up to 3 times per pregnancy.     Immunizations:  Immunization Recommendations   Influenza Vaccine Annual influenza vaccination during flu season is recommended for all persons aged >= 6 months who do not have contraindications   Pneumococcal Vaccine   * Pneumococcal conjugate vaccine = PCV13 (Prevnar 13), PCV15 (Vaxneuvance), PCV20 (Prevnar 20)  * Pneumococcal polysaccharide vaccine = PPSV23 (Pneumovax) Adults 20-63 years old: 1-3 doses may be recommended based on certain risk factors  Adults 72 years old: 1-2 doses may be recommended based off what pneumonia vaccine you previously received   Hepatitis B Vaccine 3 dose series if at intermediate or high risk (ex: diabetes, end stage renal disease, liver disease)   Tetanus (Td) Vaccine - COST NOT COVERED BY MEDICARE PART B Following completion of primary series, a booster dose should be given every 10 years to maintain immunity against tetanus. Td may also be given as tetanus wound prophylaxis. Tdap Vaccine - COST NOT COVERED BY MEDICARE PART B Recommended at least once for all adults. For pregnant patients, recommended with each pregnancy. Shingles Vaccine (Shingrix) - COST NOT COVERED BY MEDICARE PART B  2 shot series recommended in those aged 48 and above     Health Maintenance Due:      Topic Date Due    Hepatitis C Screening  Never done     Immunizations Due:      Topic Date Due    Pneumococcal Vaccine: 65+ Years (2 - PPSV23 if available, else PCV20) 11/19/2019    COVID-19 Vaccine (6 - Moderna series) 02/12/2023    Influenza Vaccine (1) 09/01/2023     Advance Directives   What are advance directives? Advance directives are legal documents that state your wishes and plans for medical care. These plans are made ahead of time in case you lose your ability to make decisions for yourself. Advance directives can apply to any medical decision, such as the treatments you want, and if you want to donate organs. What are the types of advance directives?   There are many types of advance directives, and each state has rules about how to use them. You may choose a combination of any of the following:  Living will: This is a written record of the treatment you want. You can also choose which treatments you do not want, which to limit, and which to stop at a certain time. This includes surgery, medicine, IV fluid, and tube feedings. Durable power of  for [facility]): This is a written record that states who you want to make healthcare choices for you when you are unable to make them for yourself. This person, called a proxy, is usually a family member or a friend. You may choose more than 1 proxy. Do not resuscitate (DNR) order:  A DNR order is used in case your heart stops beating or you stop breathing. It is a request not to have certain forms of treatment, such as CPR. A DNR order may be included in other types of advance directives. Medical directive: This covers the care that you want if you are in a coma, near death, or unable to make decisions for yourself. You can list the treatments you want for each condition. Treatment may include pain medicine, surgery, blood transfusions, dialysis, IV or tube feedings, and a ventilator (breathing machine). Values history: This document has questions about your views, beliefs, and how you feel and think about life. This information can help others choose the care that you would choose. Why are advance directives important? An advance directive helps you control your care. Although spoken wishes may be used, it is better to have your wishes written down. Spoken wishes can be misunderstood, or not followed. Treatments may be given even if you do not want them. An advance directive may make it easier for your family to make difficult choices about your care. Weight Management   Why it is important to manage your weight:  Being overweight increases your risk of health conditions such as heart disease, high blood pressure, type 2 diabetes, and certain types of cancer.  It can also increase your risk for osteoarthritis, sleep apnea, and other respiratory problems. Aim for a slow, steady weight loss. Even a small amount of weight loss can lower your risk of health problems. How to lose weight safely:  A safe and healthy way to lose weight is to eat fewer calories and get regular exercise. You can lose up about 1 pound a week by decreasing the number of calories you eat by 500 calories each day. Healthy meal plan for weight management:  A healthy meal plan includes a variety of foods, contains fewer calories, and helps you stay healthy. A healthy meal plan includes the following:  Eat whole-grain foods more often. A healthy meal plan should contain fiber. Fiber is the part of grains, fruits, and vegetables that is not broken down by your body. Whole-grain foods are healthy and provide extra fiber in your diet. Some examples of whole-grain foods are whole-wheat breads and pastas, oatmeal, brown rice, and bulgur. Eat a variety of vegetables every day. Include dark, leafy greens such as spinach, kale, armando greens, and mustard greens. Eat yellow and orange vegetables such as carrots, sweet potatoes, and winter squash. Eat a variety of fruits every day. Choose fresh or canned fruit (canned in its own juice or light syrup) instead of juice. Fruit juice has very little or no fiber. Eat low-fat dairy foods. Drink fat-free (skim) milk or 1% milk. Eat fat-free yogurt and low-fat cottage cheese. Try low-fat cheeses such as mozzarella and other reduced-fat cheeses. Choose meat and other protein foods that are low in fat. Choose beans or other legumes such as split peas or lentils. Choose fish, skinless poultry (chicken or turkey), or lean cuts of red meat (beef or pork). Before you cook meat or poultry, cut off any visible fat. Use less fat and oil. Try baking foods instead of frying them.  Add less fat, such as margarine, sour cream, regular salad dressing and mayonnaise to foods. Eat fewer high-fat foods. Some examples of high-fat foods include french fries, doughnuts, ice cream, and cakes. Eat fewer sweets. Limit foods and drinks that are high in sugar. This includes candy, cookies, regular soda, and sweetened drinks. Exercise:  Exercise at least 30 minutes per day on most days of the week. Some examples of exercise include walking, biking, dancing, and swimming. You can also fit in more physical activity by taking the stairs instead of the elevator or parking farther away from stores. Ask your healthcare provider about the best exercise plan for you. © Copyright [employer] 2018 Information is for End User's use only and may not be sold, redistributed or otherwise used for commercial purposes.  All illustrations and images included in CareNotes® are the copyrighted property of [employer] or [address] [place] [address]

## 2023-10-02 DIAGNOSIS — I10 HYPERTENSION, UNSPECIFIED TYPE: ICD-10-CM

## 2023-10-02 RX ORDER — METOPROLOL SUCCINATE 25 MG/1
TABLET, EXTENDED RELEASE ORAL
Qty: 90 TABLET | Refills: 3 | Status: SHIPPED | OUTPATIENT
Start: 2023-10-02

## 2023-12-26 ENCOUNTER — OFFICE VISIT (OUTPATIENT)
Dept: CARDIOLOGY CLINIC | Facility: CLINIC | Age: 78
End: 2023-12-26
Payer: COMMERCIAL

## 2023-12-26 VITALS
WEIGHT: 169 LBS | OXYGEN SATURATION: 97 % | BODY MASS INDEX: 25.61 KG/M2 | HEART RATE: 50 BPM | HEIGHT: 68 IN | SYSTOLIC BLOOD PRESSURE: 110 MMHG | DIASTOLIC BLOOD PRESSURE: 70 MMHG

## 2023-12-26 DIAGNOSIS — I71.21 ANEURYSM OF ASCENDING AORTA WITHOUT RUPTURE (HCC): ICD-10-CM

## 2023-12-26 DIAGNOSIS — E78.2 MIXED HYPERLIPIDEMIA: ICD-10-CM

## 2023-12-26 DIAGNOSIS — I48.0 PAROXYSMAL ATRIAL FIBRILLATION (HCC): Primary | ICD-10-CM

## 2023-12-26 DIAGNOSIS — I49.5 SICK SINUS SYNDROME (HCC): ICD-10-CM

## 2023-12-26 DIAGNOSIS — I10 BENIGN ESSENTIAL HYPERTENSION: ICD-10-CM

## 2023-12-26 PROCEDURE — 99214 OFFICE O/P EST MOD 30 MIN: CPT | Performed by: INTERNAL MEDICINE

## 2023-12-26 PROCEDURE — 93000 ELECTROCARDIOGRAM COMPLETE: CPT | Performed by: INTERNAL MEDICINE

## 2023-12-26 RX ORDER — LISINOPRIL 20 MG/1
20 TABLET ORAL 2 TIMES DAILY
Qty: 180 TABLET | Refills: 3 | Status: SHIPPED | OUTPATIENT
Start: 2023-12-26 | End: 2023-12-27

## 2023-12-26 RX ORDER — PRAVASTATIN SODIUM 40 MG
40 TABLET ORAL DAILY
Qty: 90 TABLET | Refills: 3 | Status: SHIPPED | OUTPATIENT
Start: 2023-12-26

## 2023-12-26 NOTE — PROGRESS NOTES
Cardiology   Williamchun Castillo . 78 y.o. male MRN: [id number]   Encounter: [id number]        Reason for Consult / Principal Problem: Establish care with cardiology    Physician Requesting Consult: Sona Laguna DO    PCP: Sona Laguna DO        Assessment/Plan:    >> Paroxysmal atrial fibrillation  >> Sinus bradycardia  >> History of DVT/PE  >> Dyslipidemia  >> Incidentally noted coronary calcifications  >> Moderate aortic aneurysm (last measurement 4.5 cm ascending aorta).    Plan:  -Discontinue aspirin, he is already on Xarelto and at this point there is no benefit to prophylactic aspirin but there is an increased risk of bleeding at his age  -Increase pravastatin to 40 mg daily from 10 mg daily.  Target LDL is ideally less than 55.  -He has self increased his lisinopril dose to 20 mg twice daily-continue  -Continue Xarelto for DVT history/paroxysmal atrial fibrillation  -Ascending aortic aneurysm is being followed by CT surgery.  -Continue to monitor his heart rhythm on metoprolol 25 mg he has not had any symptoms of slow heart rate  -Check lipid profile 1 month after increased dose of pravastatin or prior to next visit  -Next visit in 6 months or sooner if there is a change in clinical status.        CC: Presents to establish care    HPI  78 y.o. male presents to establish care.  He has a past medical history of paroxysmal atrial fibrillation on Xarelto, history of DVT also on Xarelto, coronary calcifications incidentally noted on a CT scan.  He also has a moderate ascending aortic aneurysm which is being followed by CT surgery.    He is currently taking care of his wife who is on home hospice.  He is able to perform all of his daily activities without any restriction, including caretaking.  He denies any syncopal or presyncopal episodes.    He has not had any bleeding issues so far.        The patient denies chest pain, shortness of breath, palpitations, orthopnea, PND, pedal edema, syncope, presyncope,  "diaphoresis, nausea/vomiting       The ASCVD Risk score (Georgette JAMES, et al., 2019) failed to calculate for the following reasons:    The 2019 ASCVD risk score is only valid for ages 40 to 79            Physical exam  Objective   Vitals: Blood pressure 110/70, pulse (!) 50, height 5' 8\" (1.727 m), weight 76.7 kg (169 lb), SpO2 97%.    General:  AO x3, no acute distress  Cardiac:  S1-S2 normal,  No murmurs, rubs or gallops, JVP: normal  Lungs:  Clear to auscultation bilaterally, no wheezing or crackles.  Abdomen:  Soft, nontender, nondistended.  Extremities:  Warm, well perfused, pulses palpable, no ulcers or rashes. Edema:absent  Neuro: Grossly nonfocal        ======================================================  TREADMILL STRESS  No results found for this or any previous visit.     ----------------------------------------------------------------------------------------------  NUCLEAR STRESS TEST: No results found for this or any previous visit.    No results found for this or any previous visit.      --------------------------------------------------------------------------------  CATH:  No results found for this or any previous visit.    --------------------------------------------------------------------------------  ECHO:   Results for orders placed during the hospital encounter of 16    Echo complete with contrast if indicated    [facility]  [address] [place] [address]  [phone]    Transthoracic Echocardiogram  2D, M-mode, Doppler, and Color Doppler    Study date:  08-Sep-2016    Patient: CLEMENTE LAGOS  MR number: [id number]  Account number: [id number]  : 1945  Age: 70 years  Gender: Male  Status: Outpatient  Location: Echo lab  Height: 69 in  Weight: 180 lb  BP: 135/ 83 mmHg    Indications: Follow up aortic aneurysm.    Diagnoses: I71.2 - Thoracic aortic aneurysm, without rupture    Sonographer:  FRANCA Buenrostro  Primary " Physician:  Sona Laguna DO  Referring Physician:  WILLIAM Webster  Group:  [facility]  cc:  Jose David Bishop MD  Interpreting Physician:  Jose David Bishop MD    SUMMARY    LEFT VENTRICLE:  Systolic function was vigorous. Ejection fraction was estimated in the range of  65 % to 70 %.  There were no regional wall motion abnormalities.  Wall thickness was mildly increased.    LEFT ATRIUM:  The atrium was mildly dilated.    MITRAL VALVE:  There was mild annular calcification.  There was mild to moderate regurgitation.    AORTIC VALVE:  There was mild regurgitation.    TRICUSPID VALVE:  There was mild regurgitation.    PULMONIC VALVE:  There was mild regurgitation.    AORTA:  The root exhibited mild to moderate dilatation.    PERICARDIUM:  A small pericardial effusion was identified circumferential to the heart. The  fluid had no internal echoes. There was no evidence of hemodynamic compromise.    HISTORY: PRIOR HISTORY: atrial fibrillation, hypertension, hyperlipidemia    PROCEDURE: The procedure was performed in the echo lab. This was a routine  study. The transthoracic approach was used. The study included complete 2D  imaging, M-mode, complete spectral Doppler, and color Doppler. Image quality  was adequate.    LEFT VENTRICLE: Size was normal. Systolic function was vigorous. Ejection  fraction was estimated in the range of 65 % to 70 %. There were no regional  wall motion abnormalities. Wall thickness was mildly increased. DOPPLER: There  was an increased relative contribution of atrial contraction to ventricular  filling. The deceleration time of the early transmitral flow velocity was  normal.    RIGHT VENTRICLE: The size was normal. Systolic function was normal. Wall  thickness was normal.    LEFT ATRIUM: The atrium was mildly dilated.    RIGHT ATRIUM: Size was at the upper limits of normal.    MITRAL VALVE: There was mild annular calcification. Valve structure was normal.  There was  normal leaflet separation. DOPPLER: The transmitral velocity was  within the normal range. There was no evidence for stenosis. There was mild to  moderate regurgitation.    AORTIC VALVE: The valve was trileaflet. Leaflets exhibited mildly increased  thickness. DOPPLER: There was no evidence for stenosis. There was mild  regurgitation.    TRICUSPID VALVE: The valve structure was normal. There was normal leaflet  separation. DOPPLER: The transtricuspid velocity was within the normal range.  There was no evidence for stenosis. There was mild regurgitation. Pulmonary  artery systolic pressure was mildly increased. Estimated peak PA pressure was  34 mmHg.    PULMONIC VALVE: Leaflets exhibited normal thickness, no calcification, and  normal cuspal separation. DOPPLER: The transpulmonic velocity was within the  normal range. There was mild regurgitation.    PERICARDIUM: A small pericardial effusion was identified circumferential to the  heart. The fluid had no internal echoes. There was no evidence of hemodynamic  compromise. The pericardium was normal in appearance.    AORTA: The root exhibited mild to moderate dilatation.    SYSTEMIC VEINS: IVC: The inferior vena cava was normal in size and course.  Respirophasic changes were normal.    MEASUREMENT TABLES    2D MEASUREMENTS  Aorta   (Reference normals)  AAo AP diam   43 mm   (--)    SYSTEM MEASUREMENT TABLES    2D  Ao Diam: 3.3 cm  IVSd: 1.1 cm  LA Diam: 4.3 cm  LVIDd: 5.3 cm  LVIDs: 2.9 cm  LVPWd: 1.1 cm    CW  TR Vmax: 2.7 m/s  TR maxP.7 mmHg    PW  MV A Edwin: 1 m/s  MV Dec Woodson: 3.8 m/s2  MV DecT: 190.7 ms  MV E Edwin: 0.7 m/s  MV E/A Ratio: 0.7    [employer] Accredited Echocardiography Laboratory    Prepared and electronically signed by    Jose David Bishop MD  Signed 08-Sep-2016 17:42:16    No results found for this or any previous visit.    --------------------------------------------------------------------------------  HOLTER  No results found for  this or any previous visit.    --------------------------------------------------------------------------------  CAROTIDS  No results found for this or any previous visit.       Diagnoses and all orders for this visit:    Paroxysmal atrial fibrillation (HCC)  -     POCT ECG    Benign essential hypertension  -     lisinopril (ZESTRIL) 20 mg tablet; Take 1 tablet (20 mg total) by mouth 2 (two) times a day Take 1 tablet daily in a.m. And in the p.m. P.r.n. Elevated blood pressure  -     Lipid Panel With Direct LDL; Future    Aneurysm of ascending aorta without rupture (HCC)    Sick sinus syndrome (HCC)    Mixed hyperlipidemia  -     pravastatin (PRAVACHOL) 40 mg tablet; Take 1 tablet (40 mg total) by mouth daily  -     Lipid Panel With Direct LDL; Future       ======================================================          Review of Systems  ROS as noted above, otherwise 12 point review of systems was performed and is negative.     Historical Information   Past Medical History:   Diagnosis Date    A-fib (HCC) 11/2002    Anxiety 01/15/2002    Aortic aneurysm (HCC) 08/26/2016    Arthritis     neck    Atrial fibrillation (HCC)     last assessed: 7/26/2017    Cancer (HCC)     skin, thyroid     Disease of thyroid gland     Disorder of epididymis     H/O malignant neoplasm of thyroid     Hyperlipidemia     last assessed: 6/29/2017    Hypertension     Left leg DVT (HCC) 03/07/2017    Osteopenia     Psychiatric disorder     Pulmonary embolism (HCC)     last assessed: 7/26/2017     Past Surgical History:   Procedure Laterality Date    APPENDECTOMY      BASAL CELL CARCINOMA EXCISION      neck    CATARACT EXTRACTION  03/23/2022    right eye    CATARACT EXTRACTION  04/13/2022    left eye    COLONOSCOPY  08/17/2021    complete    CYSTOSCOPY  02/01/2016    diagnostic    LEG SURGERY Left     quadricep    SQUAMOUS CELL CARCINOMA EXCISION      chest area, right leg    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     Social  "History     Substance and Sexual Activity   Alcohol Use No     Social History     Substance and Sexual Activity   Drug Use No     Social History     Tobacco Use   Smoking Status Former    Current packs/day: 0.00    Types: Cigarettes    Quit date:     Years since quittin.0   Smokeless Tobacco Never     Family History   Problem Relation Age of Onset    Coronary artery disease Father     Heart attack Father         acute myocardial infarction    Stroke Mother     Hypertension Mother        Meds/Allergies   Hospital Medications:   No current facility-administered medications for this visit.     Home Medications: (Not in a hospital admission)      Allergies   Allergen Reactions    Percocet [Oxycodone-Acetaminophen] Seizures    Penicillins      \"nearly passed out\"    Percocet [Oxycodone-Acetaminophen] Seizures         Portions of the record may have been created with voice recognition software.  Occasional wrong words or \"sound a like\" substitutions may have occurred due to the inherent limitations of voice recognition software.  Read the chart carefully and recognize, using context, where substitutions have occurred.          I spent greater than 35 minutes reviewing the patient's chart, speaking with the patient, examining the patient, reviewing tests, and writing the note.            "

## 2023-12-26 NOTE — LETTER
December 26, 2023     Sona Laguna DO  [address]  [address]  [facility] [address]    Patient: Abraham Castillo Jr.   YOB: 1945   Date of Visit: 12/26/2023       Dear Dr. Laguna:    Thank you for referring Abraham Castillo to me for evaluation. Below are my notes for this consultation.    If you have questions, please do not hesitate to call me. I look forward to following your patient along with you.         Sincerely,        Breann Gillette MD        CC: No Recipients    Breann Gillette MD  12/26/2023  2:05 PM  Sign when Signing Visit  Cardiology   Abraham Castillo Jr. 78 y.o. male MRN: [id number]   Encounter: [id number]        Reason for Consult / Principal Problem: Establish care with cardiology    Physician Requesting Consult: Sona Laguna DO    PCP: Sona Laguna DO        Assessment/Plan:    >> Paroxysmal atrial fibrillation  >> Sinus bradycardia  >> History of DVT/PE  >> Dyslipidemia  >> Incidentally noted coronary calcifications  >> Moderate aortic aneurysm (last measurement 4.5 cm ascending aorta).    Plan:  -Discontinue aspirin, he is already on Xarelto and at this point there is no benefit to prophylactic aspirin but there is an increased risk of bleeding at his age  -Increase pravastatin to 40 mg daily from 10 mg daily.  Target LDL is ideally less than 55.  -He has self increased his lisinopril dose to 20 mg twice daily-continue  -Continue Xarelto for DVT history/paroxysmal atrial fibrillation  -Ascending aortic aneurysm is being followed by CT surgery.  -Continue to monitor his heart rhythm on metoprolol 25 mg he has not had any symptoms of slow heart rate  -Check lipid profile 1 month after increased dose of pravastatin or prior to next visit  -Next visit in 6 months or sooner if there is a change in clinical status.        CC: Presents to establish care    HPI  78 y.o. male presents to establish care.  He has a past medical history of paroxysmal atrial fibrillation  "on Xarelto, history of DVT also on Xarelto, coronary calcifications incidentally noted on a CT scan.  He also has a moderate ascending aortic aneurysm which is being followed by CT surgery.    He is currently taking care of his wife who is on home hospice.  He is able to perform all of his daily activities without any restriction, including caretaking.  He denies any syncopal or presyncopal episodes.    He has not had any bleeding issues so far.        The patient denies chest pain, shortness of breath, palpitations, orthopnea, PND, pedal edema, syncope, presyncope, diaphoresis, nausea/vomiting       The ASCVD Risk score (Georgette JAMES, et al., 2019) failed to calculate for the following reasons:    The 2019 ASCVD risk score is only valid for ages 40 to 79            Physical exam  Objective  Vitals: Blood pressure 110/70, pulse (!) 50, height 5' 8\" (1.727 m), weight 76.7 kg (169 lb), SpO2 97%.    General:  AO x3, no acute distress  Cardiac:  S1-S2 normal,  No murmurs, rubs or gallops, JVP: normal  Lungs:  Clear to auscultation bilaterally, no wheezing or crackles.  Abdomen:  Soft, nontender, nondistended.  Extremities:  Warm, well perfused, pulses palpable, no ulcers or rashes. Edema:absent  Neuro: Grossly nonfocal        ======================================================  TREADMILL STRESS  No results found for this or any previous visit.     ----------------------------------------------------------------------------------------------  NUCLEAR STRESS TEST: No results found for this or any previous visit.    No results found for this or any previous visit.      --------------------------------------------------------------------------------  CATH:  No results found for this or any previous visit.    --------------------------------------------------------------------------------  ECHO:   Results for orders placed during the hospital encounter of 09/08/16    Echo complete with contrast if indicated    Narrative  [facility]  [address] [place] [address]  [phone]    Transthoracic Echocardiogram  2D, M-mode, Doppler, and Color Doppler    Study date:  08-Sep-2016    Patient: CLEMENTE LAGOS  MR number: [id number]  Account number: [id number]  : 1945  Age: 70 years  Gender: Male  Status: Outpatient  Location: Echo lab  Height: 69 in  Weight: 180 lb  BP: 135/ 83 mmHg    Indications: Follow up aortic aneurysm.    Diagnoses: I71.2 - Thoracic aortic aneurysm, without rupture    Sonographer:  FRANCA Buenrostro  Primary Physician:  Sona Laguna DO  Referring Physician:  WILLIAM Webster  Group:  [facility]  cc:  Jose David Bishop MD  Interpreting Physician:  Jose David Bishop MD    SUMMARY    LEFT VENTRICLE:  Systolic function was vigorous. Ejection fraction was estimated in the range of  65 % to 70 %.  There were no regional wall motion abnormalities.  Wall thickness was mildly increased.    LEFT ATRIUM:  The atrium was mildly dilated.    MITRAL VALVE:  There was mild annular calcification.  There was mild to moderate regurgitation.    AORTIC VALVE:  There was mild regurgitation.    TRICUSPID VALVE:  There was mild regurgitation.    PULMONIC VALVE:  There was mild regurgitation.    AORTA:  The root exhibited mild to moderate dilatation.    PERICARDIUM:  A small pericardial effusion was identified circumferential to the heart. The  fluid had no internal echoes. There was no evidence of hemodynamic compromise.    HISTORY: PRIOR HISTORY: atrial fibrillation, hypertension, hyperlipidemia    PROCEDURE: The procedure was performed in the echo lab. This was a routine  study. The transthoracic approach was used. The study included complete 2D  imaging, M-mode, complete spectral Doppler, and color Doppler. Image quality  was adequate.    LEFT VENTRICLE: Size was normal. Systolic function was vigorous. Ejection  fraction was estimated in the range of 65 % to 70 %.  There were no regional  wall motion abnormalities. Wall thickness was mildly increased. DOPPLER: There  was an increased relative contribution of atrial contraction to ventricular  filling. The deceleration time of the early transmitral flow velocity was  normal.    RIGHT VENTRICLE: The size was normal. Systolic function was normal. Wall  thickness was normal.    LEFT ATRIUM: The atrium was mildly dilated.    RIGHT ATRIUM: Size was at the upper limits of normal.    MITRAL VALVE: There was mild annular calcification. Valve structure was normal.  There was normal leaflet separation. DOPPLER: The transmitral velocity was  within the normal range. There was no evidence for stenosis. There was mild to  moderate regurgitation.    AORTIC VALVE: The valve was trileaflet. Leaflets exhibited mildly increased  thickness. DOPPLER: There was no evidence for stenosis. There was mild  regurgitation.    TRICUSPID VALVE: The valve structure was normal. There was normal leaflet  separation. DOPPLER: The transtricuspid velocity was within the normal range.  There was no evidence for stenosis. There was mild regurgitation. Pulmonary  artery systolic pressure was mildly increased. Estimated peak PA pressure was  34 mmHg.    PULMONIC VALVE: Leaflets exhibited normal thickness, no calcification, and  normal cuspal separation. DOPPLER: The transpulmonic velocity was within the  normal range. There was mild regurgitation.    PERICARDIUM: A small pericardial effusion was identified circumferential to the  heart. The fluid had no internal echoes. There was no evidence of hemodynamic  compromise. The pericardium was normal in appearance.    AORTA: The root exhibited mild to moderate dilatation.    SYSTEMIC VEINS: IVC: The inferior vena cava was normal in size and course.  Respirophasic changes were normal.    MEASUREMENT TABLES    2D MEASUREMENTS  Aorta   (Reference normals)  AAo AP diam   43 mm   (--)    SYSTEM MEASUREMENT TABLES    2D  Ao  Diam: 3.3 cm  IVSd: 1.1 cm  LA Diam: 4.3 cm  LVIDd: 5.3 cm  LVIDs: 2.9 cm  LVPWd: 1.1 cm    CW  TR Vmax: 2.7 m/s  TR maxP.7 mmHg    PW  MV A Edwin: 1 m/s  MV Dec Roberts: 3.8 m/s2  MV DecT: 190.7 ms  MV E Edwin: 0.7 m/s  MV E/A Ratio: 0.7    [facility]    Prepared and electronically signed by    Jose David Bishop MD  Signed 08-Sep-2016 17:42:16    No results found for this or any previous visit.    --------------------------------------------------------------------------------  HOLTER  No results found for this or any previous visit.    --------------------------------------------------------------------------------  CAROTIDS  No results found for this or any previous visit.       Diagnoses and all orders for this visit:    Paroxysmal atrial fibrillation (HCC)  -     POCT ECG    Benign essential hypertension  -     lisinopril (ZESTRIL) 20 mg tablet; Take 1 tablet (20 mg total) by mouth 2 (two) times a day Take 1 tablet daily in a.m. And in the p.m. P.r.n. Elevated blood pressure  -     Lipid Panel With Direct LDL; Future    Aneurysm of ascending aorta without rupture (HCC)    Sick sinus syndrome (HCC)    Mixed hyperlipidemia  -     pravastatin (PRAVACHOL) 40 mg tablet; Take 1 tablet (40 mg total) by mouth daily  -     Lipid Panel With Direct LDL; Future       ======================================================          Review of Systems  ROS as noted above, otherwise 12 point review of systems was performed and is negative.     Historical Information  Past Medical History:   Diagnosis Date   • A-fib (HCC) 2002   • Anxiety 01/15/2002   • Aortic aneurysm (HCC) 2016   • Arthritis     neck   • Atrial fibrillation (HCC)     last assessed: 2017   • Cancer (HCC)     skin, thyroid    • Disease of thyroid gland    • Disorder of epididymis    • H/O malignant neoplasm of thyroid    • Hyperlipidemia     last assessed: 2017   • Hypertension    • Left leg DVT  "(HCC) 2017   • Osteopenia    • Psychiatric disorder    • Pulmonary embolism (HCC)     last assessed: 2017     Past Surgical History:   Procedure Laterality Date   • APPENDECTOMY     • BASAL CELL CARCINOMA EXCISION      neck   • CATARACT EXTRACTION  2022    right eye   • CATARACT EXTRACTION  2022    left eye   • COLONOSCOPY  2021    complete   • CYSTOSCOPY  2016    diagnostic   • LEG SURGERY Left     quadricep   • SQUAMOUS CELL CARCINOMA EXCISION      chest area, right leg   • TOTAL THYROIDECTOMY  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     Social History     Substance and Sexual Activity   Alcohol Use No     Social History     Substance and Sexual Activity   Drug Use No     Social History     Tobacco Use   Smoking Status Former   • Current packs/day: 0.00   • Types: Cigarettes   • Quit date:    • Years since quittin.0   Smokeless Tobacco Never     Family History   Problem Relation Age of Onset   • Coronary artery disease Father    • Heart attack Father         acute myocardial infarction   • Stroke Mother    • Hypertension Mother        Meds/Allergies  Hospital Medications:   No current facility-administered medications for this visit.     Home Medications: (Not in a hospital admission)      Allergies   Allergen Reactions   • Percocet [Oxycodone-Acetaminophen] Seizures   • Penicillins      \"nearly passed out\"   • Percocet [Oxycodone-Acetaminophen] Seizures         Portions of the record may have been created with voice recognition software.  Occasional wrong words or \"sound a like\" substitutions may have occurred due to the inherent limitations of voice recognition software.  Read the chart carefully and recognize, using context, where substitutions have occurred.          I spent greater than 35 minutes reviewing the patient's chart, speaking with the patient, examining the patient, reviewing tests, and writing the note.            "

## 2023-12-27 ENCOUNTER — TELEPHONE (OUTPATIENT)
Dept: CARDIOLOGY CLINIC | Facility: CLINIC | Age: 78
End: 2023-12-27

## 2023-12-27 DIAGNOSIS — I10 BENIGN ESSENTIAL HYPERTENSION: ICD-10-CM

## 2023-12-27 RX ORDER — LISINOPRIL 20 MG/1
20 TABLET ORAL 2 TIMES DAILY
COMMUNITY
End: 2024-01-03 | Stop reason: SDUPTHER

## 2024-01-03 DIAGNOSIS — I10 BENIGN ESSENTIAL HYPERTENSION: Primary | ICD-10-CM

## 2024-01-03 RX ORDER — LISINOPRIL 20 MG/1
20 TABLET ORAL 2 TIMES DAILY
Qty: 180 TABLET | Refills: 3 | Status: SHIPPED | OUTPATIENT
Start: 2024-01-03

## 2024-01-04 DIAGNOSIS — I26.99 OTHER PULMONARY EMBOLISM WITHOUT ACUTE COR PULMONALE, UNSPECIFIED CHRONICITY (HCC): ICD-10-CM

## 2024-01-04 RX ORDER — RIVAROXABAN 20 MG/1
TABLET, FILM COATED ORAL
Qty: 90 TABLET | Refills: 3 | Status: SHIPPED | OUTPATIENT
Start: 2024-01-04

## 2024-02-13 DIAGNOSIS — F41.9 ANXIETY: ICD-10-CM

## 2024-02-13 RX ORDER — ALPRAZOLAM 0.5 MG/1
0.5 TABLET ORAL
Qty: 90 TABLET | Refills: 0 | Status: SHIPPED | OUTPATIENT
Start: 2024-02-13

## 2024-02-13 NOTE — TELEPHONE ENCOUNTER
Reason for call:   [x] Refill   [] Prior Auth  [] Other:     Office:   [x] PCP/Provider -   [] Specialty/Provider -     Medication:  ALPRAZolam (XANAX) 0.5 mg tablet    Quantity: #90    Pharmacy: [facility] - [address] [phone]    Does the patient have enough for 3 days?   [x] Yes   [] No - Send as HP to POD

## 2024-03-27 ENCOUNTER — APPOINTMENT (OUTPATIENT)
Dept: LAB | Age: 79
End: 2024-03-27
Payer: COMMERCIAL

## 2024-03-27 DIAGNOSIS — E03.9 HYPOTHYROIDISM, UNSPECIFIED TYPE: Primary | ICD-10-CM

## 2024-03-27 DIAGNOSIS — R73.03 PRE-DIABETES: ICD-10-CM

## 2024-03-27 DIAGNOSIS — E55.9 VITAMIN D DEFICIENCY: ICD-10-CM

## 2024-03-27 DIAGNOSIS — E03.9 HYPOTHYROIDISM, UNSPECIFIED TYPE: ICD-10-CM

## 2024-03-27 DIAGNOSIS — I10 BENIGN ESSENTIAL HYPERTENSION: ICD-10-CM

## 2024-03-27 DIAGNOSIS — E89.0 POSTPROCEDURAL HYPOTHYROIDISM: ICD-10-CM

## 2024-03-27 DIAGNOSIS — Z12.5 SCREENING FOR PROSTATE CANCER: ICD-10-CM

## 2024-03-27 DIAGNOSIS — C73 THYROID CANCER (HCC): ICD-10-CM

## 2024-03-27 DIAGNOSIS — F41.9 ANXIETY: ICD-10-CM

## 2024-03-27 DIAGNOSIS — I48.0 PAROXYSMAL ATRIAL FIBRILLATION (HCC): ICD-10-CM

## 2024-03-27 DIAGNOSIS — E78.2 MIXED HYPERLIPIDEMIA: ICD-10-CM

## 2024-03-27 LAB
ALBUMIN SERPL BCP-MCNC: 4.2 G/DL (ref 3.5–5)
ALP SERPL-CCNC: 60 U/L (ref 34–104)
ALT SERPL W P-5'-P-CCNC: 18 U/L (ref 7–52)
ANION GAP SERPL CALCULATED.3IONS-SCNC: 10 MMOL/L (ref 4–13)
AST SERPL W P-5'-P-CCNC: 24 U/L (ref 13–39)
BASOPHILS # BLD AUTO: 0.06 THOUSANDS/ÂΜL (ref 0–0.1)
BASOPHILS NFR BLD AUTO: 1 % (ref 0–1)
BILIRUB SERPL-MCNC: 1.76 MG/DL (ref 0.2–1)
BUN SERPL-MCNC: 24 MG/DL (ref 5–25)
CALCIUM SERPL-MCNC: 8.7 MG/DL (ref 8.4–10.2)
CHLORIDE SERPL-SCNC: 104 MMOL/L (ref 96–108)
CHOLEST SERPL-MCNC: 129 MG/DL
CO2 SERPL-SCNC: 26 MMOL/L (ref 21–32)
CREAT SERPL-MCNC: 0.74 MG/DL (ref 0.6–1.3)
EOSINOPHIL # BLD AUTO: 0.08 THOUSAND/ÂΜL (ref 0–0.61)
EOSINOPHIL NFR BLD AUTO: 2 % (ref 0–6)
ERYTHROCYTE [DISTWIDTH] IN BLOOD BY AUTOMATED COUNT: 12.9 % (ref 11.6–15.1)
GFR SERPL CREATININE-BSD FRML MDRD: 87 ML/MIN/1.73SQ M
GLUCOSE P FAST SERPL-MCNC: 104 MG/DL (ref 65–99)
HCT VFR BLD AUTO: 43.7 % (ref 36.5–49.3)
HDLC SERPL-MCNC: 52 MG/DL
HGB BLD-MCNC: 14.7 G/DL (ref 12–17)
IMM GRANULOCYTES # BLD AUTO: 0.01 THOUSAND/UL (ref 0–0.2)
IMM GRANULOCYTES NFR BLD AUTO: 0 % (ref 0–2)
LDLC SERPL CALC-MCNC: 67 MG/DL (ref 0–100)
LYMPHOCYTES # BLD AUTO: 2.17 THOUSANDS/ÂΜL (ref 0.6–4.47)
LYMPHOCYTES NFR BLD AUTO: 40 % (ref 14–44)
MCH RBC QN AUTO: 31.5 PG (ref 26.8–34.3)
MCHC RBC AUTO-ENTMCNC: 33.6 G/DL (ref 31.4–37.4)
MCV RBC AUTO: 94 FL (ref 82–98)
MONOCYTES # BLD AUTO: 0.49 THOUSAND/ÂΜL (ref 0.17–1.22)
MONOCYTES NFR BLD AUTO: 9 % (ref 4–12)
NEUTROPHILS # BLD AUTO: 2.66 THOUSANDS/ÂΜL (ref 1.85–7.62)
NEUTS SEG NFR BLD AUTO: 48 % (ref 43–75)
NRBC BLD AUTO-RTO: 0 /100 WBCS
PLATELET # BLD AUTO: 210 THOUSANDS/UL (ref 149–390)
PMV BLD AUTO: 10.6 FL (ref 8.9–12.7)
POTASSIUM SERPL-SCNC: 4 MMOL/L (ref 3.5–5.3)
PROT SERPL-MCNC: 6.5 G/DL (ref 6.4–8.4)
PSA SERPL-MCNC: 2.68 NG/ML (ref 0–4)
RBC # BLD AUTO: 4.67 MILLION/UL (ref 3.88–5.62)
SODIUM SERPL-SCNC: 140 MMOL/L (ref 135–147)
T4 FREE SERPL-MCNC: 1.19 NG/DL (ref 0.61–1.12)
TRIGL SERPL-MCNC: 49 MG/DL
TSH SERPL DL<=0.05 MIU/L-ACNC: 0.26 UIU/ML (ref 0.45–4.5)
WBC # BLD AUTO: 5.47 THOUSAND/UL (ref 4.31–10.16)

## 2024-03-27 PROCEDURE — 85025 COMPLETE CBC W/AUTO DIFF WBC: CPT

## 2024-03-27 PROCEDURE — 80061 LIPID PANEL: CPT

## 2024-03-27 PROCEDURE — 84443 ASSAY THYROID STIM HORMONE: CPT

## 2024-03-27 PROCEDURE — 36415 COLL VENOUS BLD VENIPUNCTURE: CPT

## 2024-03-27 PROCEDURE — 84439 ASSAY OF FREE THYROXINE: CPT

## 2024-03-27 PROCEDURE — 80053 COMPREHEN METABOLIC PANEL: CPT

## 2024-03-27 PROCEDURE — G0103 PSA SCREENING: HCPCS

## 2024-03-27 RX ORDER — LEVOTHYROXINE SODIUM 112 UG/1
112 TABLET ORAL
Qty: 90 TABLET | Refills: 3 | Status: SHIPPED | OUTPATIENT
Start: 2024-03-27

## 2024-04-03 ENCOUNTER — TELEPHONE (OUTPATIENT)
Age: 79
End: 2024-04-03

## 2024-04-03 NOTE — TELEPHONE ENCOUNTER
Patient called inquiring about the dose of his thyroid medication. I advised the medication levothyroxine 112 mcg tablet was prescribed and sent to the pharmacy below:    [facility]   [address] [place] [address]  Phone: [phone]    Fax: [phone]

## 2024-04-30 ENCOUNTER — OFFICE VISIT (OUTPATIENT)
Dept: FAMILY MEDICINE CLINIC | Facility: CLINIC | Age: 79
End: 2024-04-30
Payer: COMMERCIAL

## 2024-04-30 VITALS
OXYGEN SATURATION: 97 % | HEIGHT: 68 IN | HEART RATE: 42 BPM | BODY MASS INDEX: 25.49 KG/M2 | DIASTOLIC BLOOD PRESSURE: 80 MMHG | TEMPERATURE: 98 F | WEIGHT: 168.2 LBS | SYSTOLIC BLOOD PRESSURE: 130 MMHG

## 2024-04-30 DIAGNOSIS — I10 BENIGN ESSENTIAL HYPERTENSION: Primary | ICD-10-CM

## 2024-04-30 DIAGNOSIS — F41.9 ANXIETY: ICD-10-CM

## 2024-04-30 DIAGNOSIS — E78.2 MIXED HYPERLIPIDEMIA: ICD-10-CM

## 2024-04-30 DIAGNOSIS — E89.0 POSTPROCEDURAL HYPOTHYROIDISM: ICD-10-CM

## 2024-04-30 PROCEDURE — 1160F RVW MEDS BY RX/DR IN RCRD: CPT | Performed by: FAMILY MEDICINE

## 2024-04-30 PROCEDURE — 1159F MED LIST DOCD IN RCRD: CPT | Performed by: FAMILY MEDICINE

## 2024-04-30 PROCEDURE — G2211 COMPLEX E/M VISIT ADD ON: HCPCS | Performed by: FAMILY MEDICINE

## 2024-04-30 PROCEDURE — 3075F SYST BP GE 130 - 139MM HG: CPT | Performed by: FAMILY MEDICINE

## 2024-04-30 PROCEDURE — 3079F DIAST BP 80-89 MM HG: CPT | Performed by: FAMILY MEDICINE

## 2024-04-30 PROCEDURE — 99214 OFFICE O/P EST MOD 30 MIN: CPT | Performed by: FAMILY MEDICINE

## 2024-04-30 PROCEDURE — 3725F SCREEN DEPRESSION PERFORMED: CPT | Performed by: FAMILY MEDICINE

## 2024-04-30 RX ORDER — ALPRAZOLAM 0.5 MG/1
0.5 TABLET ORAL
Qty: 90 TABLET | Refills: 0 | Status: SHIPPED | OUTPATIENT
Start: 2024-04-30

## 2024-04-30 NOTE — PROGRESS NOTES
"Chief Complaint   Patient presents with    Follow-up     F/u chk up     Hypertension     Patient Instructions   Here for recheck and doing well and needs labs for thyroid rechecked after lower dose started recently. Take BP med and cholesterol med as directed.   Assessment/Plan:    No problem-specific Assessment & Plan notes found for this encounter.       Diagnoses and all orders for this visit:    Benign essential hypertension    Mixed hyperlipidemia    Postprocedural hypothyroidism    Anxiety          Subjective:      Patient ID: Abraham Castillo Jr. is a 78 y.o. male.    Follow-up (F/u chk up )  Hypertension  No cp or sob, or ha.     Hypertension        The following portions of the patient's history were reviewed and updated as appropriate: allergies, current medications, past family history, past medical history, past social history, past surgical history, and problem list.    Review of Systems   Constitutional: Negative.    HENT: Negative.     Eyes: Negative.    Respiratory: Negative.     Cardiovascular: Negative.    Gastrointestinal: Negative.    Endocrine: Negative.    Genitourinary: Negative.    Musculoskeletal: Negative.    Skin: Negative.    Allergic/Immunologic: Negative.    Neurological: Negative.    Hematological: Negative.    Psychiatric/Behavioral: Negative.           Objective:      /80   Pulse (!) 42   Temp 98 °F (36.7 °C) (Temporal)   Ht 5' 8\" (1.727 m)   Wt 76.3 kg (168 lb 3.2 oz)   SpO2 97%   BMI 25.57 kg/m²          Physical Exam  Constitutional:       Appearance: He is well-developed.      Comments: overweight   HENT:      Head: Normocephalic and atraumatic.      Right Ear: External ear normal.      Left Ear: External ear normal.      Nose: Nose normal.      Mouth/Throat:      Mouth: Mucous membranes are moist.   Eyes:      Conjunctiva/sclera: Conjunctivae normal.      Pupils: Pupils are equal, round, and reactive to light.   Cardiovascular:      Rate and Rhythm: Normal rate and " regular rhythm.      Pulses: Normal pulses.      Heart sounds: Normal heart sounds.   Pulmonary:      Effort: Pulmonary effort is normal.      Breath sounds: Normal breath sounds.   Musculoskeletal:         General: Normal range of motion.      Cervical back: Normal range of motion and neck supple.   Skin:     General: Skin is warm and dry.      Capillary Refill: Capillary refill takes less than 2 seconds.   Neurological:      General: No focal deficit present.      Mental Status: He is alert and oriented to person, place, and time. Mental status is at baseline.      Deep Tendon Reflexes: Reflexes are normal and symmetric.   Psychiatric:         Mood and Affect: Mood normal.         Behavior: Behavior normal.         Thought Content: Thought content normal.         Judgment: Judgment normal.

## 2024-04-30 NOTE — PATIENT INSTRUCTIONS
Here for recheck and doing well and needs labs for thyroid rechecked after lower dose started recently. Take BP med and cholesterol med as directed. Anxiety stable. Patient considering transfering to new PCP 1.5 miles from home and near [facility], in [place].

## 2024-05-29 ENCOUNTER — APPOINTMENT (OUTPATIENT)
Dept: LAB | Age: 79
End: 2024-05-29
Payer: COMMERCIAL

## 2024-05-29 DIAGNOSIS — E03.9 HYPOTHYROIDISM, UNSPECIFIED TYPE: ICD-10-CM

## 2024-05-29 LAB
T4 FREE SERPL-MCNC: 0.93 NG/DL (ref 0.61–1.12)
TSH SERPL DL<=0.05 MIU/L-ACNC: 1.02 UIU/ML (ref 0.45–4.5)

## 2024-05-29 PROCEDURE — 84443 ASSAY THYROID STIM HORMONE: CPT

## 2024-05-29 PROCEDURE — 36415 COLL VENOUS BLD VENIPUNCTURE: CPT

## 2024-05-29 PROCEDURE — 84439 ASSAY OF FREE THYROXINE: CPT

## 2024-06-27 ENCOUNTER — TELEPHONE (OUTPATIENT)
Dept: CARDIOLOGY CLINIC | Facility: CLINIC | Age: 79
End: 2024-06-27

## 2024-06-27 NOTE — TELEPHONE ENCOUNTER
Due to inclement weather on 6/26/2024 the office is closed today (6/27/27).     Patient aware someone will call him to reschedule his appointment with Dr Crain.

## 2024-07-25 ENCOUNTER — OFFICE VISIT (OUTPATIENT)
Dept: CARDIOLOGY CLINIC | Facility: CLINIC | Age: 79
End: 2024-07-25
Payer: COMMERCIAL

## 2024-07-25 VITALS
SYSTOLIC BLOOD PRESSURE: 146 MMHG | BODY MASS INDEX: 26.07 KG/M2 | HEART RATE: 51 BPM | HEIGHT: 68 IN | OXYGEN SATURATION: 97 % | DIASTOLIC BLOOD PRESSURE: 86 MMHG | WEIGHT: 172 LBS

## 2024-07-25 DIAGNOSIS — I48.0 PAROXYSMAL ATRIAL FIBRILLATION (HCC): Primary | ICD-10-CM

## 2024-07-25 DIAGNOSIS — R00.1 BRADYCARDIA: ICD-10-CM

## 2024-07-25 DIAGNOSIS — I26.99 OTHER PULMONARY EMBOLISM WITHOUT ACUTE COR PULMONALE, UNSPECIFIED CHRONICITY (HCC): ICD-10-CM

## 2024-07-25 PROCEDURE — 93000 ELECTROCARDIOGRAM COMPLETE: CPT | Performed by: INTERNAL MEDICINE

## 2024-07-25 PROCEDURE — 99214 OFFICE O/P EST MOD 30 MIN: CPT | Performed by: INTERNAL MEDICINE

## 2024-07-25 NOTE — LETTER
July 25, 2024     Sona Laguna DO  [address]  [address]  [place] [address]    Patient: Abraham Castillo Jr.   YOB: 1945   Date of Visit: 7/25/2024       Dear Dr. Laguna:    Thank you for referring Abraham Castillo to me for evaluation. Below are my notes for this consultation.    If you have questions, please do not hesitate to call me. I look forward to following your patient along with you.         Sincerely,        Breann Gillette MD        CC: No Recipients    Breann Gillette MD  7/25/2024 11:34 AM  Sign when Signing Visit  Cardiology   Abraham Castillo Jr. 78 y.o. male MRN: [id number]   Encounter: [id number]        Assessment/Plan:    >> Paroxysmal atrial fibrillation  >> Sinus bradycardia  >> History of DVT/PE  >> Dyslipidemia  >> Incidentally noted coronary calcifications  >> Moderate aortic aneurysm (last measurement 4.5 cm ascending aorta).    Plan:  -Discontinue aspirin, he is already on Xarelto and at this point there is no benefit to prophylactic aspirin but there is an increased risk of bleeding at his age  -Continue pravastatin 40 mg daily.  Target LDL is ideally less than 55.  -He has self increased his lisinopril dose to 20 mg twice daily-continue  -Continue Xarelto for DVT history/paroxysmal atrial fibrillation  -Ascending aortic aneurysm is being followed by CT surgery.  -Continue to monitor his heart rhythm on metoprolol 25 mg he has not had any symptoms of slow heart rate  -Check 48-hour Holter to rule out pauses/advanced heart block.  If he has this we will discontinue metoprolol and refer to EP for pacemaker  -Check lipid profile 1 month after increased dose of pravastatin or prior to next visit  -Next visit in1 year or sooner if there is a change in clinical status.    7/25/2024: No complaints, has been doing well physically.  Denies lightheadedness or loss of consciousness.  He has been taking care of his wife who is on hospice.  He has been taking all of his  "medications as instructed.  He states his heart rate is usually slow but he does not feel like he has had any symptoms from    CC: Presents to [facility] care    HPI  78 y.o. male presents to [facility].  He has a past medical history of paroxysmal atrial fibrillation on Xarelto, history of DVT also on Xarelto, coronary calcifications incidentally noted on a CT scan.  He also has a moderate ascending aortic aneurysm which is being followed by CT surgery.    He is currently taking care of his wife who is on home hospice.  He is able to perform all of his daily activities without any restriction, including caretaking.  He denies any syncopal or presyncopal episodes.    He has not had any bleeding issues so far.        The patient denies chest pain, shortness of breath, palpitations, orthopnea, PND, pedal edema, syncope, presyncope, diaphoresis, nausea/vomiting       The ASCVD Risk score (Georgette JAMES, et al., 2019) failed to calculate for the following reasons:    The 2019 ASCVD risk score is only valid for ages 40 to 79            Physical exam  Objective   Vitals: Blood pressure 146/86, pulse (!) 51, height 5' 8\" (1.727 m), weight 78 kg (172 lb), SpO2 97%.    General:  AO x3, no acute distress  Cardiac:  S1-S2 normal,  No murmurs, rubs or gallops, JVP: normal  Lungs:  Clear to auscultation bilaterally, no wheezing or crackles.  Abdomen:  Soft, nontender, nondistended.  Extremities:  Warm, well perfused, pulses palpable, no ulcers or rashes. Edema:absent  Neuro: Grossly nonfocal        ======================================================  TREADMILL STRESS  No results found for this or any previous visit.     ----------------------------------------------------------------------------------------------  NUCLEAR STRESS TEST: No results found for this or any previous visit.    No results found for this or any previous visit.      --------------------------------------------------------------------------------  CATH:  " No results found for this or any previous visit.    --------------------------------------------------------------------------------  ECHO:   Results for orders placed during the hospital encounter of 16    Echo complete with contrast if indicated    [address] [address]  [phone]    Transthoracic Echocardiogram  2D, M-mode, Doppler, and Color Doppler    Study date:  08-Sep-2016    Patient: CLEMENTE LAGOS  MR number: [id number]  Account number: [id number]  : 1945  Age: 70 years  Gender: Male  Status: Outpatient  Location: Echo lab  Height: 69 in  Weight: 180 lb  BP: 135/ 83 mmHg    Indications: Follow up aortic aneurysm.    Diagnoses: I71.2 - Thoracic aortic aneurysm, without rupture    Sonographer:  FRANCA Buenrostro  Primary Physician:  Sona Laguna DO  Referring Physician:  WILLIAM Webster  Group:  [facility]  cc:  Jose David Bishop MD  Interpreting Physician:  Jose David Bishop MD    SUMMARY    LEFT VENTRICLE:  Systolic function was vigorous. Ejection fraction was estimated in the range of  65 % to 70 %.  There were no regional wall motion abnormalities.  Wall thickness was mildly increased.    LEFT ATRIUM:  The atrium was mildly dilated.    MITRAL VALVE:  There was mild annular calcification.  There was mild to moderate regurgitation.    AORTIC VALVE:  There was mild regurgitation.    TRICUSPID VALVE:  There was mild regurgitation.    PULMONIC VALVE:  There was mild regurgitation.    AORTA:  The root exhibited mild to moderate dilatation.    PERICARDIUM:  A small pericardial effusion was identified circumferential to the heart. The  fluid had no internal echoes. There was no evidence of hemodynamic compromise.    HISTORY: PRIOR HISTORY: atrial fibrillation, hypertension, hyperlipidemia    PROCEDURE: The procedure was performed in the echo lab. This was a routine  study. The transthoracic  approach was used. The study included complete 2D  imaging, M-mode, complete spectral Doppler, and color Doppler. Image quality  was adequate.    LEFT VENTRICLE: Size was normal. Systolic function was vigorous. Ejection  fraction was estimated in the range of 65 % to 70 %. There were no regional  wall motion abnormalities. Wall thickness was mildly increased. DOPPLER: There  was an increased relative contribution of atrial contraction to ventricular  filling. The deceleration time of the early transmitral flow velocity was  normal.    RIGHT VENTRICLE: The size was normal. Systolic function was normal. Wall  thickness was normal.    LEFT ATRIUM: The atrium was mildly dilated.    RIGHT ATRIUM: Size was at the upper limits of normal.    MITRAL VALVE: There was mild annular calcification. Valve structure was normal.  There was normal leaflet separation. DOPPLER: The transmitral velocity was  within the normal range. There was no evidence for stenosis. There was mild to  moderate regurgitation.    AORTIC VALVE: The valve was trileaflet. Leaflets exhibited mildly increased  thickness. DOPPLER: There was no evidence for stenosis. There was mild  regurgitation.    TRICUSPID VALVE: The valve structure was normal. There was normal leaflet  separation. DOPPLER: The transtricuspid velocity was within the normal range.  There was no evidence for stenosis. There was mild regurgitation. Pulmonary  artery systolic pressure was mildly increased. Estimated peak PA pressure was  34 mmHg.    PULMONIC VALVE: Leaflets exhibited normal thickness, no calcification, and  normal cuspal separation. DOPPLER: The transpulmonic velocity was within the  normal range. There was mild regurgitation.    PERICARDIUM: A small pericardial effusion was identified circumferential to the  heart. The fluid had no internal echoes. There was no evidence of hemodynamic  compromise. The pericardium was normal in appearance.    AORTA: The root exhibited mild to  moderate dilatation.    SYSTEMIC VEINS: IVC: The inferior vena cava was normal in size and course.  Respirophasic changes were normal.    MEASUREMENT TABLES    2D MEASUREMENTS  Aorta   (Reference normals)  AAo AP diam   43 mm   (--)    SYSTEM MEASUREMENT TABLES    2D  Ao Diam: 3.3 cm  IVSd: 1.1 cm  LA Diam: 4.3 cm  LVIDd: 5.3 cm  LVIDs: 2.9 cm  LVPWd: 1.1 cm    CW  TR Vmax: 2.7 m/s  TR maxP.7 mmHg    PW  MV A Edwin: 1 m/s  MV Dec Magoffin: 3.8 m/s2  MV DecT: 190.7 ms  MV E Edwin: 0.7 m/s  MV E/A Ratio: 0.7    [facility]    Prepared and electronically signed by    Jose David Bishop MD  Signed 08-Sep-2016 17:42:16    No results found for this or any previous visit.    --------------------------------------------------------------------------------  HOLTER  No results found for this or any previous visit.    --------------------------------------------------------------------------------  CAROTIDS  No results found for this or any previous visit.       Diagnoses and all orders for this visit:    Paroxysmal atrial fibrillation (HCC)  -     POCT ECG    Bradycardia  -     Holter monitor; Future    Other pulmonary embolism without acute cor pulmonale, unspecified chronicity (HCC)       ======================================================          Review of Systems   Constitutional:  Negative for chills and fever.   HENT:  Negative for ear pain and sore throat.    Eyes:  Negative for pain and visual disturbance.   Respiratory:  Negative for cough and shortness of breath.    Cardiovascular:  Negative for chest pain and palpitations.   Gastrointestinal:  Negative for abdominal pain and vomiting.   Genitourinary:  Negative for dysuria and hematuria.   Musculoskeletal:  Negative for arthralgias and back pain.   Skin:  Negative for color change and rash.   Neurological:  Negative for seizures and syncope.   All other systems reviewed and are negative.    ROS as noted above,  "otherwise 12 point review of systems was performed and is negative.     Historical Information   Past Medical History:   Diagnosis Date   • A-fib (HCC) 2002   • Anxiety 01/15/2002   • Aortic aneurysm (HCC) 2016   • Arthritis     neck   • Atrial fibrillation (HCC)     last assessed: 2017   • Cancer (HCC)     skin, thyroid    • Disease of thyroid gland    • Disorder of epididymis    • H/O malignant neoplasm of thyroid    • Hyperlipidemia     last assessed: 2017   • Hypertension    • Left leg DVT (HCC) 2017   • Osteopenia    • Psychiatric disorder    • Pulmonary embolism (HCC)     last assessed: 2017     Past Surgical History:   Procedure Laterality Date   • APPENDECTOMY     • BASAL CELL CARCINOMA EXCISION      neck   • CATARACT EXTRACTION  2022    right eye   • CATARACT EXTRACTION  2022    left eye   • COLONOSCOPY  2021    complete   • CYSTOSCOPY  2016    diagnostic   • LEG SURGERY Left     quadricep   • SQUAMOUS CELL CARCINOMA EXCISION      chest area, right leg   • TOTAL THYROIDECTOMY  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     Social History     Substance and Sexual Activity   Alcohol Use No     Social History     Substance and Sexual Activity   Drug Use No     Social History     Tobacco Use   Smoking Status Former   • Current packs/day: 0.00   • Types: Cigarettes   • Quit date:    • Years since quittin.6   Smokeless Tobacco Never     Family History   Problem Relation Age of Onset   • Coronary artery disease Father    • Heart attack Father         acute myocardial infarction   • Stroke Mother    • Hypertension Mother        Meds/Allergies   Hospital Medications:   No current facility-administered medications for this visit.     Home Medications: Not in a hospital admission.      Allergies   Allergen Reactions   • Percocet [Oxycodone-Acetaminophen] Seizures   • Penicillins      \"nearly passed out\"   • Percocet [Oxycodone-Acetaminophen] Seizures " "        Portions of the record may have been created with voice recognition software.  Occasional wrong words or \"sound a like\" substitutions may have occurred due to the inherent limitations of voice recognition software.  Read the chart carefully and recognize, using context, where substitutions have occurred.          I spent greater than 35 minutes reviewing the patient's chart, speaking with the patient, examining the patient, reviewing tests, and writing the note.            "

## 2024-07-25 NOTE — PROGRESS NOTES
Cardiology   Abraham IMANI Castillo . 78 y.o. male MRN: [id number]   Encounter: [id number]        Assessment/Plan:    >> Paroxysmal atrial fibrillation  >> Sinus bradycardia  >> History of DVT/PE  >> Dyslipidemia  >> Incidentally noted coronary calcifications  >> Moderate aortic aneurysm (last measurement 4.5 cm ascending aorta).    Plan:  -Discontinue aspirin, he is already on Xarelto and at this point there is no benefit to prophylactic aspirin but there is an increased risk of bleeding at his age  -Continue pravastatin 40 mg daily.  Target LDL is ideally less than 55.  -He has self increased his lisinopril dose to 20 mg twice daily-continue  -Continue Xarelto for DVT history/paroxysmal atrial fibrillation  -Ascending aortic aneurysm is being followed by CT surgery.  -Continue to monitor his heart rhythm on metoprolol 25 mg he has not had any symptoms of slow heart rate  -Check 48-hour Holter to rule out pauses/advanced heart block.  If he has this we will discontinue metoprolol and refer to EP for pacemaker  -Check lipid profile 1 month after increased dose of pravastatin or prior to next visit  -Next visit in1 year or sooner if there is a change in clinical status.    7/25/2024: No complaints, has been doing well physically.  Denies lightheadedness or loss of consciousness.  He has been taking care of his wife who is on hospice.  He has been taking all of his medications as instructed.  He states his heart rate is usually slow but he does not feel like he has had any symptoms from    CC: Presents to establish care    HPI  78 y.o. male presents to establish care.  He has a past medical history of paroxysmal atrial fibrillation on Xarelto, history of DVT also on Xarelto, coronary calcifications incidentally noted on a CT scan.  He also has a moderate ascending aortic aneurysm which is being followed by CT surgery.    He is currently taking care of his wife who is on home hospice.  He is able to perform all of his daily  "activities without any restriction, including caretaking.  He denies any syncopal or presyncopal episodes.    He has not had any bleeding issues so far.        The patient denies chest pain, shortness of breath, palpitations, orthopnea, PND, pedal edema, syncope, presyncope, diaphoresis, nausea/vomiting       The ASCVD Risk score (Georgette JAMES, et al., 2019) failed to calculate for the following reasons:    The 2019 ASCVD risk score is only valid for ages 40 to 79            Physical exam  Objective   Vitals: Blood pressure 146/86, pulse (!) 51, height 5' 8\" (1.727 m), weight 78 kg (172 lb), SpO2 97%.    General:  AO x3, no acute distress  Cardiac:  S1-S2 normal,  No murmurs, rubs or gallops, JVP: normal  Lungs:  Clear to auscultation bilaterally, no wheezing or crackles.  Abdomen:  Soft, nontender, nondistended.  Extremities:  Warm, well perfused, pulses palpable, no ulcers or rashes. Edema:absent  Neuro: Grossly nonfocal        ======================================================  TREADMILL STRESS  No results found for this or any previous visit.     ----------------------------------------------------------------------------------------------  NUCLEAR STRESS TEST: No results found for this or any previous visit.    No results found for this or any previous visit.      --------------------------------------------------------------------------------  CATH:  No results found for this or any previous visit.    --------------------------------------------------------------------------------  ECHO:   Results for orders placed during the hospital encounter of 16    Echo complete with contrast if indicated    [address] [address]  [phone]    Transthoracic Echocardiogram  2D, M-mode, Doppler, and Color Doppler    Study date:  08-Sep-2016    Patient: CLEMENTE LAGOS  MR number: [id number]  Account number: [id number]  : 1945  Age: " 70 years  Gender: Male  Status: Outpatient  Location: Echo lab  Height: 69 in  Weight: 180 lb  BP: 135/ 83 mmHg    Indications: Follow up aortic aneurysm.    Diagnoses: I71.2 - Thoracic aortic aneurysm, without rupture    Sonographer:  FRANCA Buenrostro  Primary Physician:  Sona Laguna DO  Referring Physician:  WILLIAM Webster  Group:  [facility]  cc:  Jose David Bishop MD  Interpreting Physician:  Jose David Bishop MD    SUMMARY    LEFT VENTRICLE:  Systolic function was vigorous. Ejection fraction was estimated in the range of  65 % to 70 %.  There were no regional wall motion abnormalities.  Wall thickness was mildly increased.    LEFT ATRIUM:  The atrium was mildly dilated.    MITRAL VALVE:  There was mild annular calcification.  There was mild to moderate regurgitation.    AORTIC VALVE:  There was mild regurgitation.    TRICUSPID VALVE:  There was mild regurgitation.    PULMONIC VALVE:  There was mild regurgitation.    AORTA:  The root exhibited mild to moderate dilatation.    PERICARDIUM:  A small pericardial effusion was identified circumferential to the heart. The  fluid had no internal echoes. There was no evidence of hemodynamic compromise.    HISTORY: PRIOR HISTORY: atrial fibrillation, hypertension, hyperlipidemia    PROCEDURE: The procedure was performed in the echo lab. This was a routine  study. The transthoracic approach was used. The study included complete 2D  imaging, M-mode, complete spectral Doppler, and color Doppler. Image quality  was adequate.    LEFT VENTRICLE: Size was normal. Systolic function was vigorous. Ejection  fraction was estimated in the range of 65 % to 70 %. There were no regional  wall motion abnormalities. Wall thickness was mildly increased. DOPPLER: There  was an increased relative contribution of atrial contraction to ventricular  filling. The deceleration time of the early transmitral flow velocity was  normal.    RIGHT VENTRICLE: The size was  normal. Systolic function was normal. Wall  thickness was normal.    LEFT ATRIUM: The atrium was mildly dilated.    RIGHT ATRIUM: Size was at the upper limits of normal.    MITRAL VALVE: There was mild annular calcification. Valve structure was normal.  There was normal leaflet separation. DOPPLER: The transmitral velocity was  within the normal range. There was no evidence for stenosis. There was mild to  moderate regurgitation.    AORTIC VALVE: The valve was trileaflet. Leaflets exhibited mildly increased  thickness. DOPPLER: There was no evidence for stenosis. There was mild  regurgitation.    TRICUSPID VALVE: The valve structure was normal. There was normal leaflet  separation. DOPPLER: The transtricuspid velocity was within the normal range.  There was no evidence for stenosis. There was mild regurgitation. Pulmonary  artery systolic pressure was mildly increased. Estimated peak PA pressure was  34 mmHg.    PULMONIC VALVE: Leaflets exhibited normal thickness, no calcification, and  normal cuspal separation. DOPPLER: The transpulmonic velocity was within the  normal range. There was mild regurgitation.    PERICARDIUM: A small pericardial effusion was identified circumferential to the  heart. The fluid had no internal echoes. There was no evidence of hemodynamic  compromise. The pericardium was normal in appearance.    AORTA: The root exhibited mild to moderate dilatation.    SYSTEMIC VEINS: IVC: The inferior vena cava was normal in size and course.  Respirophasic changes were normal.    MEASUREMENT TABLES    2D MEASUREMENTS  Aorta   (Reference normals)  AAo AP diam   43 mm   (--)    SYSTEM MEASUREMENT TABLES    2D  Ao Diam: 3.3 cm  IVSd: 1.1 cm  LA Diam: 4.3 cm  LVIDd: 5.3 cm  LVIDs: 2.9 cm  LVPWd: 1.1 cm    CW  TR Vmax: 2.7 m/s  TR maxP.7 mmHg    PW  MV A Edwin: 1 m/s  MV Dec Churchill: 3.8 m/s2  MV DecT: 190.7 ms  MV E Edwin: 0.7 m/s  MV E/A Ratio: 0.7    [employer] Accredited Echocardiography  Laboratory    Prepared and electronically signed by    Jose David Bishop MD  Signed 08-Sep-2016 17:42:16    No results found for this or any previous visit.    --------------------------------------------------------------------------------  HOLTER  No results found for this or any previous visit.    --------------------------------------------------------------------------------  CAROTIDS  No results found for this or any previous visit.       Diagnoses and all orders for this visit:    Paroxysmal atrial fibrillation ([facility])  -     POCT ECG    Bradycardia  -     Holter monitor; Future    Other pulmonary embolism without acute cor pulmonale, unspecified chronicity ([facility])       ======================================================          Review of Systems   Constitutional:  Negative for chills and fever.   HENT:  Negative for ear pain and sore throat.    Eyes:  Negative for pain and visual disturbance.   Respiratory:  Negative for cough and shortness of breath.    Cardiovascular:  Negative for chest pain and palpitations.   Gastrointestinal:  Negative for abdominal pain and vomiting.   Genitourinary:  Negative for dysuria and hematuria.   Musculoskeletal:  Negative for arthralgias and back pain.   Skin:  Negative for color change and rash.   Neurological:  Negative for seizures and syncope.   All other systems reviewed and are negative.    ROS as noted above, otherwise 12 point review of systems was performed and is negative.     Historical Information   Past Medical History:   Diagnosis Date    A-fib ([facility]) 11/2002    Anxiety 01/15/2002    Aortic aneurysm ([facility]) 08/26/2016    Arthritis     neck    Atrial fibrillation ([facility])     last assessed: 7/26/2017    Cancer ([facility])     skin, thyroid     Disease of thyroid gland     Disorder of epididymis     H/O malignant neoplasm of thyroid     Hyperlipidemia     last assessed: 6/29/2017    Hypertension     Left leg DVT ([facility]) 03/07/2017    Osteopenia     Psychiatric disorder      "Pulmonary embolism ([facility])     last assessed: 2017     Past Surgical History:   Procedure Laterality Date    APPENDECTOMY      BASAL CELL CARCINOMA EXCISION      neck    CATARACT EXTRACTION  2022    right eye    CATARACT EXTRACTION  2022    left eye    COLONOSCOPY  2021    complete    CYSTOSCOPY  2016    diagnostic    LEG SURGERY Left     quadricep    SQUAMOUS CELL CARCINOMA EXCISION      chest area, right leg    TOTAL THYROIDECTOMY  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     Social History     Substance and Sexual Activity   Alcohol Use No     Social History     Substance and Sexual Activity   Drug Use No     Social History     Tobacco Use   Smoking Status Former    Current packs/day: 0.00    Types: Cigarettes    Quit date:     Years since quittin.6   Smokeless Tobacco Never     Family History   Problem Relation Age of Onset    Coronary artery disease Father     Heart attack Father         acute myocardial infarction    Stroke Mother     Hypertension Mother        Meds/Allergies   Hospital Medications:   No current facility-administered medications for this visit.     Home Medications: Not in a hospital admission.      Allergies   Allergen Reactions    Percocet [Oxycodone-Acetaminophen] Seizures    Penicillins      \"nearly passed out\"    Percocet [Oxycodone-Acetaminophen] Seizures         Portions of the record may have been created with voice recognition software.  Occasional wrong words or \"sound a like\" substitutions may have occurred due to the inherent limitations of voice recognition software.  Read the chart carefully and recognize, using context, where substitutions have occurred.          I spent greater than 35 minutes reviewing the patient's chart, speaking with the patient, examining the patient, reviewing tests, and writing the note.            "

## 2024-08-06 ENCOUNTER — HOSPITAL ENCOUNTER (OUTPATIENT)
Dept: NON INVASIVE DIAGNOSTICS | Facility: CLINIC | Age: 79
Discharge: HOME | End: 2024-08-06
Payer: COMMERCIAL

## 2024-08-06 DIAGNOSIS — R00.1 BRADYCARDIA: ICD-10-CM

## 2024-08-06 PROCEDURE — 93225 XTRNL ECG REC<48 HRS REC: CPT

## 2024-08-06 PROCEDURE — 93226 XTRNL ECG REC<48 HR SCAN A/R: CPT

## 2024-08-14 ENCOUNTER — TELEPHONE (OUTPATIENT)
Age: 79
End: 2024-08-14

## 2024-08-14 PROCEDURE — 93227 XTRNL ECG REC<48 HR R&I: CPT | Performed by: INTERNAL MEDICINE

## 2024-08-14 NOTE — TELEPHONE ENCOUNTER
Patient is aware colonoscopies are not recommended due to his age and was wondering if his PCP felt he should have a cologuard test. If so, he would like more information on the process. Patient would like a call back to advise.

## 2024-08-20 ENCOUNTER — TELEPHONE (OUTPATIENT)
Dept: CARDIOLOGY CLINIC | Facility: CLINIC | Age: 79
End: 2024-08-20

## 2024-09-04 ENCOUNTER — TELEPHONE (OUTPATIENT)
Age: 79
End: 2024-09-04

## 2024-09-04 DIAGNOSIS — F41.9 ANXIETY: ICD-10-CM

## 2024-09-04 RX ORDER — ALPRAZOLAM 0.5 MG
0.5 TABLET ORAL
Qty: 90 TABLET | Refills: 0 | Status: SHIPPED | OUTPATIENT
Start: 2024-09-04

## 2024-09-04 NOTE — TELEPHONE ENCOUNTER
Notify patient just refilled Xanax and rec f-up as directed with our office or new office of PCP for med refills as this med is a controlled substance.

## 2024-09-05 NOTE — TELEPHONE ENCOUNTER
PA for ALPRAZolam SUBMITTED     via    [x][facility]-KEY: [id number]  [][employer]-Case ID #   []Faxed to plan   []Other website   []Phone call Case ID #     Office notes sent, clinical questions answered. Awaiting determination    Turnaround time for your insurance to make a decision on your Prior Authorization can take 7-21 business days.

## 2024-09-24 DIAGNOSIS — I10 HYPERTENSION, UNSPECIFIED TYPE: ICD-10-CM

## 2024-09-24 RX ORDER — METOPROLOL SUCCINATE 25 MG/1
TABLET, EXTENDED RELEASE ORAL
Qty: 90 TABLET | Refills: 0 | Status: SHIPPED | OUTPATIENT
Start: 2024-09-24

## 2024-10-18 ENCOUNTER — OFFICE VISIT (OUTPATIENT)
Dept: INTERNAL MEDICINE CLINIC | Age: 79
End: 2024-10-18
Payer: COMMERCIAL

## 2024-10-18 VITALS
HEIGHT: 68 IN | WEIGHT: 173 LBS | DIASTOLIC BLOOD PRESSURE: 82 MMHG | TEMPERATURE: 98.4 F | RESPIRATION RATE: 20 BRPM | OXYGEN SATURATION: 97 % | BODY MASS INDEX: 26.22 KG/M2 | SYSTOLIC BLOOD PRESSURE: 134 MMHG | HEART RATE: 58 BPM

## 2024-10-18 DIAGNOSIS — E78.2 MIXED HYPERLIPIDEMIA: ICD-10-CM

## 2024-10-18 DIAGNOSIS — R73.03 PRE-DIABETES: ICD-10-CM

## 2024-10-18 DIAGNOSIS — K21.9 GASTROESOPHAGEAL REFLUX DISEASE, UNSPECIFIED WHETHER ESOPHAGITIS PRESENT: ICD-10-CM

## 2024-10-18 DIAGNOSIS — Z76.89 ENCOUNTER TO ESTABLISH CARE: Primary | ICD-10-CM

## 2024-10-18 DIAGNOSIS — M53.3 COCCYGEAL PAIN: ICD-10-CM

## 2024-10-18 DIAGNOSIS — Z23 ENCOUNTER FOR IMMUNIZATION: ICD-10-CM

## 2024-10-18 DIAGNOSIS — C73 THYROID CANCER (HCC): ICD-10-CM

## 2024-10-18 DIAGNOSIS — E55.9 VITAMIN D DEFICIENCY: ICD-10-CM

## 2024-10-18 DIAGNOSIS — I48.0 PAROXYSMAL ATRIAL FIBRILLATION (HCC): ICD-10-CM

## 2024-10-18 DIAGNOSIS — Z12.5 SCREENING FOR PROSTATE CANCER: ICD-10-CM

## 2024-10-18 DIAGNOSIS — Z00.00 MEDICARE ANNUAL WELLNESS VISIT, SUBSEQUENT: ICD-10-CM

## 2024-10-18 DIAGNOSIS — I71.21 ANEURYSM OF ASCENDING AORTA WITHOUT RUPTURE (HCC): ICD-10-CM

## 2024-10-18 DIAGNOSIS — Z86.0100 HISTORY OF COLONIC POLYPS: ICD-10-CM

## 2024-10-18 DIAGNOSIS — M85.88 OSTEOPENIA OF OTHER SITE: ICD-10-CM

## 2024-10-18 DIAGNOSIS — I10 BENIGN ESSENTIAL HYPERTENSION: ICD-10-CM

## 2024-10-18 PROCEDURE — G0439 PPPS, SUBSEQ VISIT: HCPCS | Performed by: INTERNAL MEDICINE

## 2024-10-18 PROCEDURE — 99214 OFFICE O/P EST MOD 30 MIN: CPT | Performed by: INTERNAL MEDICINE

## 2024-10-18 NOTE — PATIENT INSTRUCTIONS
Medicare Preventive Visit Patient Instructions  Thank you for completing your Welcome to Medicare Visit or Medicare Annual Wellness Visit today. Your next wellness visit will be due in one year (10/19/2025).  The screening/preventive services that you may require over the next 5-10 years are detailed below. Some tests may not apply to you based off risk factors and/or age. Screening tests ordered at today's visit but not completed yet may show as past due. Also, please note that scanned in results may not display below.  Preventive Screenings:  Service Recommendations Previous Testing/Comments   Colorectal Cancer Screening  Colonoscopy    Fecal Occult Blood Test (FOBT)/Fecal Immunochemical Test (FIT)  Fecal DNA/Cologuard Test  Flexible Sigmoidoscopy Age: 45-75 years old   Colonoscopy: every 10 years (May be performed more frequently if at higher risk)  OR  FOBT/FIT: every 1 year  OR  Cologuard: every 3 years  OR  Sigmoidoscopy: every 5 years  Screening may be recommended earlier than age 45 if at higher risk for colorectal cancer. Also, an individualized decision between you and your healthcare provider will decide whether screening between the ages of 76-85 would be appropriate. Colonoscopy: 08/17/2021  FOBT/FIT: Not on file  Cologuard: Not on file  Sigmoidoscopy: Not on file          Prostate Cancer Screening Individualized decision between patient and health care provider in men between ages of 55-69   Medicare will cover every 12 months beginning on the day after your 50th birthday PSA: 2.68 ng/mL     Screening Not Indicated     Hepatitis C Screening Once for adults born between 1945 and 1965  More frequently in patients at high risk for Hepatitis C Hep C Antibody: Not on file        Diabetes Screening 1-2 times per year if you're at risk for diabetes or have pre-diabetes Fasting glucose: 104 mg/dL (3/27/2024)  A1C: 5.5 % (3/29/2023)  Screening Current   Cholesterol Screening Once every 5 years if you don't have  a lipid disorder. May order more often based on risk factors. Lipid panel: 03/27/2024  Screening Not Indicated  History Lipid Disorder      Other Preventive Screenings Covered by Medicare:  Abdominal Aortic Aneurysm (AAA) Screening: covered once if your at risk. You're considered to be at risk if you have a family history of AAA or a male between the age of 65-75 who smoking at least 100 cigarettes in your lifetime.  Lung Cancer Screening: covers low dose CT scan once per year if you meet all of the following conditions: (1) Age 55-77; (2) No signs or symptoms of lung cancer; (3) Current smoker or have quit smoking within the last 15 years; (4) You have a tobacco smoking history of at least 20 pack years (packs per day x number of years you smoked); (5) You get a written order from a healthcare provider.  Glaucoma Screening: covered annually if you're considered high risk: (1) You have diabetes OR (2) Family history of glaucoma OR (3)  aged 50 and older OR (4)  American aged 65 and older  Osteoporosis Screening: covered every 2 years if you meet one of the following conditions: (1) Have a vertebral abnormality; (2) On glucocorticoid therapy for more than 3 months; (3) Have primary hyperparathyroidism; (4) On osteoporosis medications and need to assess response to drug therapy.  HIV Screening: covered annually if you're between the age of 15-65. Also covered annually if you are younger than 15 and older than 65 with risk factors for HIV infection. For pregnant patients, it is covered up to 3 times per pregnancy.    Immunizations:  Immunization Recommendations   Influenza Vaccine Annual influenza vaccination during flu season is recommended for all persons aged >= 6 months who do not have contraindications   Pneumococcal Vaccine   * Pneumococcal conjugate vaccine = PCV13 (Prevnar 13), PCV15 (Vaxneuvance), PCV20 (Prevnar 20)  * Pneumococcal polysaccharide vaccine = PPSV23 (Pneumovax) Adults  19-65 yo with certain risk factors or if 65+ yo  If never received any pneumonia vaccine: recommend Prevnar 20 (PCV20)  Give PCV20 if previously received 1 dose of PCV13 or PPSV23   Hepatitis B Vaccine 3 dose series if at intermediate or high risk (ex: diabetes, end stage renal disease, liver disease)   Respiratory syncytial virus (RSV) Vaccine - COVERED BY MEDICARE PART D  * RSVPreF3 (Arexvy) CDC recommends that adults 60 years of age and older may receive a single dose of RSV vaccine using shared clinical decision-making (SCDM)   Tetanus (Td) Vaccine - COST NOT COVERED BY MEDICARE PART B Following completion of primary series, a booster dose should be given every 10 years to maintain immunity against tetanus. Td may also be given as tetanus wound prophylaxis.   Tdap Vaccine - COST NOT COVERED BY MEDICARE PART B Recommended at least once for all adults. For pregnant patients, recommended with each pregnancy.   Shingles Vaccine (Shingrix) - COST NOT COVERED BY MEDICARE PART B  2 shot series recommended in those 19 years and older who have or will have weakened immune systems or those 50 years and older     Health Maintenance Due:  There are no preventive care reminders to display for this patient.  Immunizations Due:      Topic Date Due   • Pneumococcal Vaccine: 65+ Years (2 of 2 - PPSV23 or PCV20) 11/19/2019   • Influenza Vaccine (1) 09/01/2024   • COVID-19 Vaccine (6 - 2023-24 season) 09/01/2024     Advance Directives   What are advance directives?  Advance directives are legal documents that state your wishes and plans for medical care. These plans are made ahead of time in case you lose your ability to make decisions for yourself. Advance directives can apply to any medical decision, such as the treatments you want, and if you want to donate organs.   What are the types of advance directives?  There are many types of advance directives, and each state has rules about how to use them. You may choose a combination  of any of the following:  Living will:  This is a written record of the treatment you want. You can also choose which treatments you do not want, which to limit, and which to stop at a certain time. This includes surgery, medicine, IV fluid, and tube feedings.   Durable power of  for healthcare (DPAHC):  This is a written record that states who you want to make healthcare choices for you when you are unable to make them for yourself. This person, called a proxy, is usually a family member or a friend. You may choose more than 1 proxy.  Do not resuscitate (DNR) order:  A DNR order is used in case your heart stops beating or you stop breathing. It is a request not to have certain forms of treatment, such as CPR. A DNR order may be included in other types of advance directives.  Medical directive:  This covers the care that you want if you are in a coma, near death, or unable to make decisions for yourself. You can list the treatments you want for each condition. Treatment may include pain medicine, surgery, blood transfusions, dialysis, IV or tube feedings, and a ventilator (breathing machine).  Values history:  This document has questions about your views, beliefs, and how you feel and think about life. This information can help others choose the care that you would choose.  Why are advance directives important?  An advance directive helps you control your care. Although spoken wishes may be used, it is better to have your wishes written down. Spoken wishes can be misunderstood, or not followed. Treatments may be given even if you do not want them. An advance directive may make it easier for your family to make difficult choices about your care.   Weight Management   Why it is important to manage your weight:  Being overweight increases your risk of health conditions such as heart disease, high blood pressure, type 2 diabetes, and certain types of cancer. It can also increase your risk for osteoarthritis,  sleep apnea, and other respiratory problems. Aim for a slow, steady weight loss. Even a small amount of weight loss can lower your risk of health problems.  How to lose weight safely:  A safe and healthy way to lose weight is to eat fewer calories and get regular exercise. You can lose up about 1 pound a week by decreasing the number of calories you eat by 500 calories each day.   Healthy meal plan for weight management:  A healthy meal plan includes a variety of foods, contains fewer calories, and helps you stay healthy. A healthy meal plan includes the following:  Eat whole-grain foods more often.  A healthy meal plan should contain fiber. Fiber is the part of grains, fruits, and vegetables that is not broken down by your body. Whole-grain foods are healthy and provide extra fiber in your diet. Some examples of whole-grain foods are whole-wheat breads and pastas, oatmeal, brown rice, and bulgur.  Eat a variety of vegetables every day.  Include dark, leafy greens such as spinach, kale, arm[place] greens, and mustard greens. Eat yellow and orange vegetables such as carrots, sweet potatoes, and winter squash.   Eat a variety of fruits every day.  Choose fresh or canned fruit (canned in its own juice or light syrup) instead of juice. Fruit juice has very little or no fiber.  Eat low-fat dairy foods.  Drink fat-free (skim) milk or 1% milk. Eat fat-free yogurt and low-fat cottage cheese. Try low-fat cheeses such as mozzarella and other reduced-fat cheeses.  Choose meat and other protein foods that are low in fat.  Choose beans or other legumes such as split peas or lentils. Choose fish, skinless poultry (chicken or turkey), or lean cuts of red meat (beef or pork). Before you cook meat or poultry, cut off any visible fat.   Use less fat and oil.  Try baking foods instead of frying them. Add less fat, such as margarine, sour cream, regular salad dressing and mayonnaise to foods. Eat fewer high-fat foods. Some examples of  high-fat foods include french fries, doughnuts, ice cream, and cakes.  Eat fewer sweets.  Limit foods and drinks that are high in sugar. This includes candy, cookies, regular soda, and sweetened drinks.  Exercise:  Exercise at least 30 minutes per day on most days of the week. Some examples of exercise include walking, biking, dancing, and swimming. You can also fit in more physical activity by taking the stairs instead of the elevator or parking farther away from stores. Ask your healthcare provider about the best exercise plan for you.      © Copyright [employer] 2018 Information is for End User's use only and may not be sold, redistributed or otherwise used for commercial purposes. All illustrations and images included in CareNotes® are the copyrighted property of [employer] or [employer]    Medicare Preventive Visit Patient Instructions  Thank you for completing your Welcome to Medicare Visit or Medicare Annual Wellness Visit today. Your next wellness visit will be due in one year (10/19/2025).  The screening/preventive services that you may require over the next 5-10 years are detailed below. Some tests may not apply to you based off risk factors and/or age. Screening tests ordered at today's visit but not completed yet may show as past due. Also, please note that scanned in results may not display below.  Preventive Screenings:  Service Recommendations Previous Testing/Comments   Colorectal Cancer Screening  Colonoscopy    Fecal Occult Blood Test (FOBT)/Fecal Immunochemical Test (FIT)  Fecal DNA/Cologuard Test  Flexible Sigmoidoscopy Age: 45-75 years old   Colonoscopy: every 10 years (May be performed more frequently if at higher risk)  OR  FOBT/FIT: every 1 year  OR  Cologuard: every 3 years  OR  Sigmoidoscopy: every 5 years  Screening may be recommended earlier than age 45 if at higher risk for colorectal cancer. Also, an individualized decision between you and your healthcare provider will  decide whether screening between the ages of 76-85 would be appropriate. Colonoscopy: 08/17/2021  FOBT/FIT: Not on file  Cologuard: Not on file  Sigmoidoscopy: Not on file          Prostate Cancer Screening Individualized decision between patient and health care provider in men between ages of 55-69   Medicare will cover every 12 months beginning on the day after your 50th birthday PSA: 2.68 ng/mL     Screening Not Indicated     Hepatitis C Screening Once for adults born between 1945 and 1965  More frequently in patients at high risk for Hepatitis C Hep C Antibody: Not on file        Diabetes Screening 1-2 times per year if you're at risk for diabetes or have pre-diabetes Fasting glucose: 104 mg/dL (3/27/2024)  A1C: 5.5 % (3/29/2023)  Screening Current   Cholesterol Screening Once every 5 years if you don't have a lipid disorder. May order more often based on risk factors. Lipid panel: 03/27/2024  Screening Not Indicated  History Lipid Disorder      Other Preventive Screenings Covered by Medicare:  Abdominal Aortic Aneurysm (AAA) Screening: covered once if your at risk. You're considered to be at risk if you have a family history of AAA or a male between the age of 65-75 who smoking at least 100 cigarettes in your lifetime.  Lung Cancer Screening: covers low dose CT scan once per year if you meet all of the following conditions: (1) Age 55-77; (2) No signs or symptoms of lung cancer; (3) Current smoker or have quit smoking within the last 15 years; (4) You have a tobacco smoking history of at least 20 pack years (packs per day x number of years you smoked); (5) You get a written order from a healthcare provider.  Glaucoma Screening: covered annually if you're considered high risk: (1) You have diabetes OR (2) Family history of glaucoma OR (3)  aged 50 and older OR (4)  American aged 65 and older  Osteoporosis Screening: covered every 2 years if you meet one of the following conditions: (1)  Have a vertebral abnormality; (2) On glucocorticoid therapy for more than 3 months; (3) Have primary hyperparathyroidism; (4) On osteoporosis medications and need to assess response to drug therapy.  HIV Screening: covered annually if you're between the age of 15-65. Also covered annually if you are younger than 15 and older than 65 with risk factors for HIV infection. For pregnant patients, it is covered up to 3 times per pregnancy.    Immunizations:  Immunization Recommendations   Influenza Vaccine Annual influenza vaccination during flu season is recommended for all persons aged >= 6 months who do not have contraindications   Pneumococcal Vaccine   * Pneumococcal conjugate vaccine = PCV13 (Prevnar 13), PCV15 (Vaxneuvance), PCV20 (Prevnar 20)  * Pneumococcal polysaccharide vaccine = PPSV23 (Pneumovax) Adults 19-63 yo with certain risk factors or if 65+ yo  If never received any pneumonia vaccine: recommend Prevnar 20 (PCV20)  Give PCV20 if previously received 1 dose of PCV13 or PPSV23   Hepatitis B Vaccine 3 dose series if at intermediate or high risk (ex: diabetes, end stage renal disease, liver disease)   Respiratory syncytial virus (RSV) Vaccine - COVERED BY MEDICARE PART D  * RSVPreF3 (Arexvy) CDC recommends that adults 60 years of age and older may receive a single dose of RSV vaccine using shared clinical decision-making (SCDM)   Tetanus (Td) Vaccine - COST NOT COVERED BY MEDICARE PART B Following completion of primary series, a booster dose should be given every 10 years to maintain immunity against tetanus. Td may also be given as tetanus wound prophylaxis.   Tdap Vaccine - COST NOT COVERED BY MEDICARE PART B Recommended at least once for all adults. For pregnant patients, recommended with each pregnancy.   Shingles Vaccine (Shingrix) - COST NOT COVERED BY MEDICARE PART B  2 shot series recommended in those 19 years and older who have or will have weakened immune systems or those 50 years and older      Health Maintenance Due:  There are no preventive care reminders to display for this patient.  Immunizations Due:      Topic Date Due   • Pneumococcal Vaccine: 65+ Years (2 of 2 - PPSV23 or PCV20) 11/19/2019   • Influenza Vaccine (1) 09/01/2024   • COVID-19 Vaccine (6 - 2023-24 season) 09/01/2024     Advance Directives   What are advance directives?  Advance directives are legal documents that state your wishes and plans for medical care. These plans are made ahead of time in case you lose your ability to make decisions for yourself. Advance directives can apply to any medical decision, such as the treatments you want, and if you want to donate organs.   What are the types of advance directives?  There are many types of advance directives, and each state has rules about how to use them. You may choose a combination of any of the following:  Living will:  This is a written record of the treatment you want. You can also choose which treatments you do not want, which to limit, and which to stop at a certain time. This includes surgery, medicine, IV fluid, and tube feedings.   Durable power of  for healthcare (DPAHC):  This is a written record that states who you want to make healthcare choices for you when you are unable to make them for yourself. This person, called a proxy, is usually a family member or a friend. You may choose more than 1 proxy.  Do not resuscitate (DNR) order:  A DNR order is used in case your heart stops beating or you stop breathing. It is a request not to have certain forms of treatment, such as CPR. A DNR order may be included in other types of advance directives.  Medical directive:  This covers the care that you want if you are in a coma, near death, or unable to make decisions for yourself. You can list the treatments you want for each condition. Treatment may include pain medicine, surgery, blood transfusions, dialysis, IV or tube feedings, and a ventilator (breathing  machine).  Values history:  This document has questions about your views, beliefs, and how you feel and think about life. This information can help others choose the care that you would choose.  Why are advance directives important?  An advance directive helps you control your care. Although spoken wishes may be used, it is better to have your wishes written down. Spoken wishes can be misunderstood, or not followed. Treatments may be given even if you do not want them. An advance directive may make it easier for your family to make difficult choices about your care.   Weight Management   Why it is important to manage your weight:  Being overweight increases your risk of health conditions such as heart disease, high blood pressure, type 2 diabetes, and certain types of cancer. It can also increase your risk for osteoarthritis, sleep apnea, and other respiratory problems. Aim for a slow, steady weight loss. Even a small amount of weight loss can lower your risk of health problems.  How to lose weight safely:  A safe and healthy way to lose weight is to eat fewer calories and get regular exercise. You can lose up about 1 pound a week by decreasing the number of calories you eat by 500 calories each day.   Healthy meal plan for weight management:  A healthy meal plan includes a variety of foods, contains fewer calories, and helps you stay healthy. A healthy meal plan includes the following:  Eat whole-grain foods more often.  A healthy meal plan should contain fiber. Fiber is the part of grains, fruits, and vegetables that is not broken down by your body. Whole-grain foods are healthy and provide extra fiber in your diet. Some examples of whole-grain foods are whole-wheat breads and pastas, oatmeal, brown rice, and bulgur.  Eat a variety of vegetables every day.  Include dark, leafy greens such as spinach, kale, [place] greens, and mustard greens. Eat yellow and orange vegetables such as carrots, sweet potatoes, and  winter squash.   Eat a variety of fruits every day.  Choose fresh or canned fruit (canned in its own juice or light syrup) instead of juice. Fruit juice has very little or no fiber.  Eat low-fat dairy foods.  Drink fat-free (skim) milk or 1% milk. Eat fat-free yogurt and low-fat cottage cheese. Try low-fat cheeses such as mozzarella and other reduced-fat cheeses.  Choose meat and other protein foods that are low in fat.  Choose beans or other legumes such as split peas or lentils. Choose fish, skinless poultry (chicken or turkey), or lean cuts of red meat (beef or pork). Before you cook meat or poultry, cut off any visible fat.   Use less fat and oil.  Try baking foods instead of frying them. Add less fat, such as margarine, sour cream, regular salad dressing and mayonnaise to foods. Eat fewer high-fat foods. Some examples of high-fat foods include french fries, doughnuts, ice cream, and cakes.  Eat fewer sweets.  Limit foods and drinks that are high in sugar. This includes candy, cookies, regular soda, and sweetened drinks.  Exercise:  Exercise at least 30 minutes per day on most days of the week. Some examples of exercise include walking, biking, dancing, and swimming. You can also fit in more physical activity by taking the stairs instead of the elevator or parking farther away from stores. Ask your healthcare provider about the best exercise plan for you.      © Copyright [employer] 2018 Information is for End User's use only and may not be sold, redistributed or otherwise used for commercial purposes. All illustrations and images included in CareNotes® are the copyrighted property of [employer] or [employer]

## 2024-10-18 NOTE — PROGRESS NOTES
Ambulatory Visit  Name: Abraham Castillo Jr.      : 1945      MRN: [id number]  Encounter Provider: Natty Melgar DO  Encounter Date: 10/18/2024   Encounter department: [facility]    Assessment & Plan      Depression Screening and Follow-up Plan: Patient was screened for depression during today's encounter. They screened negative with a PHQ-2 score of 0.      Preventive health issues were discussed with patient, and age appropriate screening tests were ordered as noted in patient's After Visit Summary. Personalized health advice and appropriate referrals for health education or preventive services given if needed, as noted in patient's After Visit Summary.    History of Present Illness     HPI   Patient Care Team:  Ankur Velasquez MD as PCP - Endocrinology (Endocrinology)  WILLIAM Webster DO Bankim Bhatt, MD Jarrod Evan Rosenthal, MD Paul Gulotta, MD    Review of Systems  Medical History Reviewed by provider this encounter:       Annual Wellness Visit Questionnaire   Abraham is here for his Subsequent Wellness visit. Last Medicare Wellness visit information reviewed, patient interviewed and updates made to the record today.      Health Risk Assessment:   Patient rates overall health as good. Patient feels that their physical health rating is same. Patient is satisfied with their life. Eyesight was rated as same. Hearing was rated as same. Patient feels that their emotional and mental health rating is same. Patients states they are never, rarely angry. Patient states they are never, rarely unusually tired/fatigued. Pain experienced in the last 7 days has been none. Patient states that he has experienced no weight loss or gain in last 6 months.     Depression Screening:   PHQ-2 Score: 0      Fall Risk Screening:   In the past year, patient has experienced: no history of falling in past year      Home Safety:  Patient does not have trouble with stairs  inside or outside of their home. Patient has working smoke alarms and has working carbon monoxide detector. Home safety hazards include: none.     Nutrition:   Current diet is Limited junk food.     Medications:   Patient is not currently taking any over-the-counter supplements. Patient is able to manage medications.     Activities of Daily Living (ADLs)/Instrumental Activities of Daily Living (IADLs):   Walk and transfer into and out of bed and chair?: Yes  Dress and groom yourself?: Yes    Bathe or shower yourself?: Yes    Feed yourself? Yes  Do your laundry/housekeeping?: Yes  Manage your money, pay your bills and track your expenses?: Yes  Make your own meals?: Yes    Do your own shopping?: Yes    Previous Hospitalizations:   Any hospitalizations or ED visits within the last 12 months?: No      Advance Care Planning:   Living will: Yes    Durable POA for healthcare: Yes    Advanced directive: Yes      PREVENTIVE SCREENINGS      Cardiovascular Screening:    General: Screening Not Indicated and History Lipid Disorder      Diabetes Screening:     General: Screening Current      Prostate Cancer Screening:    General: Screening Not Indicated      Osteoporosis Screening:    General: Screening Not Indicated and History Osteoporosis      Abdominal Aortic Aneurysm (AAA) Screening:    Risk factors include: tobacco use        Lung Cancer Screening:     General: Screening Not Indicated    Screening, Brief Intervention, and Referral to Treatment (SBIRT)    Screening  Typical number of drinks in a day: 0  Typical number of drinks in a week: 0  Interpretation: Low risk drinking behavior.    Single Item Drug Screening:  How often have you used an illegal drug (including marijuana) or a prescription medication for non-medical reasons in the past year? never    Single Item Drug Screen Score: 0  Interpretation: Negative screen for possible drug use disorder    Social Determinants of Health     Financial Resource Strain: Low Risk   "(8/9/2023)    Overall Financial Resource Strain (CARDIA)     Difficulty of Paying Living Expenses: Not hard at all   Transportation Needs: No Transportation Needs (8/9/2023)    PRAPARE - Transportation     Lack of Transportation (Medical): No     Lack of Transportation (Non-Medical): No     No results found.    Objective     Ht 5' 8\" (1.727 m)   Wt 78.5 kg (173 lb)   BMI 26.30 kg/m²     Physical Exam    "

## 2024-10-18 NOTE — PROGRESS NOTES
Assessment/Plan:    Medicare annual wellness visit, subsequent  -Medicare annual wellness visit done   - last colonoscopy was done in 2021 and showed 3 polyps.  Patient is not interested in having any more colonoscopies at his age.  He denies a family history of colon cancer.  -  Lung cancer screen is not indicated because he quit smoking about 50 years ago.  - DEXA scan was done many years ago and showed osteopenia.  He takes vitamin D and calcium tablets.  -He is up-to-date with all his COVID vaccines, pneumococcal vaccine, RSV and flu shot.  -follow-up in 6 months.    Encounter to establish care  - Patient has established care with our practice and will sign for release of his medical records from his previous PCP.  -follow-up in 6 months    Primary hypertension   - blood pressure is well controlled   -continue with lisinopril and metoprolol  -continue with a low-salt diet and with exercise    Hyperlipidemia  -Lipids are well controlled  - last lipid panel done on 3/27/2024, showed a total cholesterol of 129, down from 136 on 3/29/2023,  A triglyceride of 49, up from 40, and HDL of 52, down from 57 and an LDL of 67, down from 71  -continue with a heart healthy diet and exercise  -continue with pravastatin    Anxiety  - well controlled   - continue with as needed alprazolam    History of thyroid cancer with postablative hypothyroidism  - well controlled, last TSH done on 5/29/2024 was 1.020, up from 0.261  -Continue to follow with endocrinology  -continue with levothyroxine at current dose    Vitamin D deficiency  - well controlled   - continue with vitamin D supplements especially with history of osteopenia.    Paroxysmal atrial fibrillation  -Rate controlled  -Continue with metoprolol and rivaroxaban  -Continue to follow with cardiology    Prediabetes  - well controlled   - continue with diet  low carbohydrate and low sugar diet as well as exercise    Thoracic aortic aneurysm  -Last CT chest done in March 2023  showed ascending aortic aneurysm of 4.5 cm  -Will order repeat CT chest without contrast to be done this year but patient was counseled to call his insurance to ensure that they will pay for the test before he gets it done.    Coccygeal pain  -We will order an x-ray of the sacrum and coccyx and follow-up with the results  -He may use over-the-counter acetaminophen if needed     Diagnoses and all orders for this visit:    Encounter to establish care    Benign essential hypertension    Paroxysmal atrial fibrillation (HCC)    Gastroesophageal reflux disease, unspecified whether esophagitis present    Thyroid cancer (HCC)    Vitamin D deficiency    Mixed hyperlipidemia    Pre-diabetes    Medicare annual wellness visit, subsequent    Aneurysm of ascending aorta without rupture (HCC)  -     CT chest wo contrast; Future    History of colonic polyps  Comments:  last colonoscopy 2021 and they found 3 polyps and he decided not to do them anymore. No family hx of colon ca.    Screening for prostate cancer  Comments:  last psa was normal earlier this year. no luts.    Osteopenia of other site  Comments:  had a dexa can that showed osteopenia. he takes his vit d and ca and does wt bearing exercise    Encounter for immunization  Comments:  he is up to date with his covid vaccines, pneumococcal vaccines, RSV and flu shot    Coccygeal pain  -     XR sacrum and coccyx; Future          Subjective:      Patient ID: Abraham Castillo Jr. is a 79 y.o. male.    HPI    Patient presents to establish care.    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, thyroid ca, osteopenia    Past surgical history- thyroidectomy, appendectomy, cataract    Medications-see list     Allergies - see list    Alcohol use - rarely     Nicotine use -quit smoking over 50 years ago - smoked in [place]    Recreational drug use  -none    Work-retired     Health maintenance-last annual wellness was over a year ago     Immunization- up to date with all the covid shots, RSV, flu, pneumococcal vaccines     Family history-  Htn - mom  cva - mom  Cad - dad   Breast  ca- P aunt     Today, complains of pain in his coccygeal region that he usually feels after taking a bath, when he presses on the area.  He states that this has been going on for a couple of months.  He denies any trauma, fall or motor vehicle accident prior to onset of pain.  He denies any pruritus or rash.  He sees his cardilogist for his atrial fibrillation.  Takes xarelto and denies any gingival bleeding, hematochezia and hematuria.  He admits to occasional arthralgias especially in his hands as well as feelings of anxiety but denies  any fever, chills, night sweats, headache, dizziness, nasal congestion, runny nose, pnd, sore throat, ear ache, sinus pain or pressure, wheezing, cough, chest pain, sob, palpitations, nausea, vomiting, diarrhea, constipation, hematochezia, hematuria, melena stools,   myalgias, feelings of  depression or insomnia.    The following portions of the patient's history were reviewed and updated as appropriate: He  has a past medical history of A-fib ([facility]) (11/2002), Anxiety (01/15/2002), Aortic aneurysm ([facility]) (08/26/2016), Arthritis, Atrial fibrillation ([facility]), Cancer ([facility]), Disease of thyroid gland, Disorder of epididymis, H/O malignant neoplasm of thyroid, Hyperlipidemia, Hypertension, Left leg DVT ([facility]) (03/07/2017), Osteopenia, Psychiatric disorder, and Pulmonary embolism ([facility]).  He   Patient Active Problem List    Diagnosis Date Noted    Coccygeal pain 10/18/2024    Traumatic rupture of quadriceps tendon 11/09/2020    Other pulmonary embolism without acute cor pulmonale ([facility]) 06/24/2020    Hematospermia 01/06/2020    Pulmonary nodules 03/27/2017    Embolism and thrombosis of tibial vein, left ([facility]) 03/13/2017    Aneurysm, ascending aorta  ([facility]) 09/01/2016    Thyroid cancer (HCC) 02/12/2014    Anxiety 12/17/2013    Sick sinus syndrome (HCC) 07/03/2013    Paroxysmal atrial fibrillation ([facility]) 07/02/2013    Pre-diabetes 05/17/2013    Vitamin D deficiency 04/05/2013    Postprocedural hypothyroidism 02/19/2013    Esophageal reflux 08/17/2012    Osteoporosis 06/08/2012    Benign essential hypertension 05/03/2012    Hyperlipidemia 05/03/2012     He  has a past surgical history that includes Leg Surgery (Left); Appendectomy; Colonoscopy (08/17/2021); Cystoscopy (02/01/2016); Total thyroidectomy (02/08/2011); Squamous cell carcinoma excision; Excision basal cell carcinoma; Cataract extraction (03/23/2022); and Cataract extraction (04/13/2022).  His family history includes Coronary artery disease in his father; Heart attack in his father; Hypertension in his mother; Stroke in his mother.  He  reports that he quit smoking about 57 years ago. His smoking use included cigarettes. He has never used smokeless tobacco. He reports that he does not drink alcohol and does not use drugs.  Current Outpatient Medications   Medication Sig Dispense Refill    ALPRAZolam (XANAX) 0.5 mg tablet Take 1 tablet (0.5 mg total) by mouth daily at bedtime as needed for anxiety 90 tablet 0    levothyroxine 112 mcg tablet Take 1 tablet (112 mcg total) by mouth daily in the early morning 90 tablet 3    lisinopril (ZESTRIL) 20 mg tablet Take 1 tablet (20 mg total) by mouth 2 (two) times a day (Patient taking differently: Take 40 mg by mouth daily) 180 tablet 3    metoprolol succinate (TOPROL-XL) 25 mg 24 hr tablet TAKE 1 TABLET DAILY 90 tablet 0    pravastatin (PRAVACHOL) 40 mg tablet Take 1 tablet (40 mg total) by mouth daily 90 tablet 3    Xarelto 20 MG tablet TAKE 1 TABLET DAILY 90 tablet 3     No current facility-administered medications for this visit.     Current Outpatient Medications on File Prior to Visit   Medication Sig    ALPRAZolam (XANAX) 0.5 mg tablet Take 1 tablet (0.5 mg  total) by mouth daily at bedtime as needed for anxiety    levothyroxine 112 mcg tablet Take 1 tablet (112 mcg total) by mouth daily in the early morning    lisinopril (ZESTRIL) 20 mg tablet Take 1 tablet (20 mg total) by mouth 2 (two) times a day (Patient taking differently: Take 40 mg by mouth daily)    metoprolol succinate (TOPROL-XL) 25 mg 24 hr tablet TAKE 1 TABLET DAILY    pravastatin (PRAVACHOL) 40 mg tablet Take 1 tablet (40 mg total) by mouth daily    Xarelto 20 MG tablet TAKE 1 TABLET DAILY     No current facility-administered medications on file prior to visit.     He is allergic to percocet [oxycodone-acetaminophen], penicillins, and percocet [oxycodone-acetaminophen]..    Review of Systems   Constitutional:  Negative for activity change, chills, fatigue, fever and unexpected weight change.   HENT:  Negative for ear pain, postnasal drip, rhinorrhea, sinus pressure and sore throat.    Eyes:  Negative for pain.   Respiratory:  Negative for cough, choking, chest tightness, shortness of breath and wheezing.    Cardiovascular:  Negative for chest pain, palpitations and leg swelling.   Gastrointestinal:  Negative for abdominal pain, constipation, diarrhea, nausea and vomiting.   Genitourinary:  Negative for dysuria and hematuria.   Musculoskeletal:  Positive for arthralgias and back pain. Negative for gait problem, joint swelling, myalgias and neck stiffness.   Skin:  Negative for pallor and rash.   Neurological:  Negative for dizziness, tremors, seizures, syncope, light-headedness and headaches.   Hematological:  Negative for adenopathy.   Psychiatric/Behavioral:  Negative for behavioral problems and sleep disturbance. The patient is nervous/anxious (had a lot of anxiety when he was moving and has been on xanax for a long time - wife is in hospice).          Objective:      /82 (BP Location: Left arm, Patient Position: Sitting, Cuff Size: Standard)   Pulse 58   Temp 98.4 °F (36.9 °C) (Temporal)   Resp  "20   Ht 5' 8\" (1.727 m)   Wt 78.5 kg (173 lb)   SpO2 97%   BMI 26.30 kg/m²          Physical Exam  Constitutional:       General: He is not in acute distress.     Appearance: He is well-developed. He is not diaphoretic.   HENT:      Head: Normocephalic and atraumatic.      Right Ear: External ear normal.      Left Ear: External ear normal.      Nose: Nose normal.      Mouth/Throat:      Mouth: Mucous membranes are dry.      Pharynx: Posterior oropharyngeal erythema present. No oropharyngeal exudate.   Eyes:      General: No scleral icterus.        Right eye: No discharge.         Left eye: No discharge.      Conjunctiva/sclera: Conjunctivae normal.      Pupils: Pupils are equal, round, and reactive to light.   Neck:      Thyroid: No thyromegaly.      Vascular: No JVD.      Trachea: No tracheal deviation.   Cardiovascular:      Rate and Rhythm: Regular rhythm. Bradycardia present.      Heart sounds: Normal heart sounds. No murmur heard.     No friction rub. No gallop.   Pulmonary:      Effort: Pulmonary effort is normal. No respiratory distress.      Breath sounds: Normal breath sounds. No wheezing or rales.   Chest:      Chest wall: No tenderness.   Abdominal:      General: Bowel sounds are normal. There is no distension.      Palpations: Abdomen is soft. There is no mass.      Tenderness: There is no abdominal tenderness. There is no guarding or rebound.   Musculoskeletal:         General: No tenderness. Normal range of motion.      Right hand: Deformity present.      Left hand: Deformity (Deformity of the interphalangeal joints including Maninder's and Heberden nodes) present.      Cervical back: Normal range of motion and neck supple.        Back:       Comments: Chaperone was Kylie Shelley MA   Lymphadenopathy:      Cervical: No cervical adenopathy.   Skin:     General: Skin is warm and dry.      Coloration: Skin is not pale.      Findings: No erythema or rash.   Neurological:      Mental Status: He is alert " and oriented to person, place, and time.      Cranial Nerves: No cranial nerve deficit.      Motor: No abnormal muscle tone.      Coordination: Coordination normal.      Deep Tendon Reflexes: Reflexes are normal and symmetric.   Psychiatric:         Behavior: Behavior normal.           Appointment on 05/29/2024   Component Date Value Ref Range Status    TSH 3RD GENERATON 05/29/2024 1.020  0.450 - 4.500 uIU/mL Final    Adult TSH (3rd generation) reference range follows the recommended guidelines of the American Thyroid Association, January, 2020.    Free T4 05/29/2024 0.93  0.61 - 1.12 ng/dL Final    Specimens with biotin concentrations > 10 ng/mL can lead to significant (> 10%) positive bias in result.   Appointment on 03/27/2024   Component Date Value Ref Range Status    Sodium 03/27/2024 140  135 - 147 mmol/L Final    Potassium 03/27/2024 4.0  3.5 - 5.3 mmol/L Final    Chloride 03/27/2024 104  96 - 108 mmol/L Final    CO2 03/27/2024 26  21 - 32 mmol/L Final    ANION GAP 03/27/2024 10  4 - 13 mmol/L Final    BUN 03/27/2024 24  5 - 25 mg/dL Final    Creatinine 03/27/2024 0.74  0.60 - 1.30 mg/dL Final    Standardized to IDMS reference method    Glucose, Fasting 03/27/2024 104 (H)  65 - 99 mg/dL Final    Calcium 03/27/2024 8.7  8.4 - 10.2 mg/dL Final    AST 03/27/2024 24  13 - 39 U/L Final    ALT 03/27/2024 18  7 - 52 U/L Final    Specimen collection should occur prior to Sulfasalazine administration due to the potential for falsely depressed results.     Alkaline Phosphatase 03/27/2024 60  34 - 104 U/L Final    Total Protein 03/27/2024 6.5  6.4 - 8.4 g/dL Final    Albumin 03/27/2024 4.2  3.5 - 5.0 g/dL Final    Total Bilirubin 03/27/2024 1.76 (H)  0.20 - 1.00 mg/dL Final    Use of this assay is not recommended for patients undergoing treatment with eltrombopag due to the potential for falsely elevated results.  N-acetyl-p-benzoquinone imine (metabolite of Acetaminophen) will generate erroneously low results in  samples for patients that have taken an overdose of Acetaminophen.    eGFR 03/27/2024 87  ml/min/1.73sq m Final    WBC 03/27/2024 5.47  4.31 - 10.16 Thousand/uL Final    RBC 03/27/2024 4.67  3.88 - 5.62 Million/uL Final    Hemoglobin 03/27/2024 14.7  12.0 - 17.0 g/dL Final    Hematocrit 03/27/2024 43.7  36.5 - 49.3 % Final    MCV 03/27/2024 94  82 - 98 fL Final    MCH 03/27/2024 31.5  26.8 - 34.3 pg Final    MCHC 03/27/2024 33.6  31.4 - 37.4 g/dL Final    RDW 03/27/2024 12.9  11.6 - 15.1 % Final    MPV 03/27/2024 10.6  8.9 - 12.7 fL Final    Platelets 03/27/2024 210  149 - 390 Thousands/uL Final    nRBC 03/27/2024 0  /100 WBCs Final    Segmented % 03/27/2024 48  43 - 75 % Final    Immature Grans % 03/27/2024 0  0 - 2 % Final    Lymphocytes % 03/27/2024 40  14 - 44 % Final    Monocytes % 03/27/2024 9  4 - 12 % Final    Eosinophils Relative 03/27/2024 2  0 - 6 % Final    Basophils Relative 03/27/2024 1  0 - 1 % Final    Absolute Neutrophils 03/27/2024 2.66  1.85 - 7.62 Thousands/µL Final    Absolute Immature Grans 03/27/2024 0.01  0.00 - 0.20 Thousand/uL Final    Absolute Lymphocytes 03/27/2024 2.17  0.60 - 4.47 Thousands/µL Final    Absolute Monocytes 03/27/2024 0.49  0.17 - 1.22 Thousand/µL Final    Eosinophils Absolute 03/27/2024 0.08  0.00 - 0.61 Thousand/µL Final    Basophils Absolute 03/27/2024 0.06  0.00 - 0.10 Thousands/µL Final    Cholesterol 03/27/2024 129  See Comment mg/dL Final    Cholesterol:         Pediatric <18 Years        Desirable          <170 mg/dL      Borderline High    170-199 mg/dL      High               >=200 mg/dL        Adult >=18 Years            Desirable         <200 mg/dL      Borderline High   200-239 mg/dL      High              >239 mg/dL      Triglycerides 03/27/2024 49  See Comment mg/dL Final    Triglyceride:     0-9Y            <75mg/dL     10Y-17Y         <90 mg/dL       >=18Y     Normal          <150 mg/dL     Borderline High 150-199 mg/dL     High            200-499 mg/dL         Very High       >499 mg/dL    Specimen collection should occur prior to Metamizole administration due to the potential for falsely depressed results.    HDL, Direct 03/27/2024 52  >=40 mg/dL Final    LDL Calculated 03/27/2024 67  0 - 100 mg/dL Final    LDL Cholesterol:     Optimal           <100 mg/dl     Near Optimal      100-129 mg/dl     Above Optimal       Borderline High 130-159 mg/dl       High            160-189 mg/dl       Very High       >189 mg/dl         This screening LDL is a calculated result.   It does not have the accuracy of the Direct Measured LDL in the monitoring of patients with hyperlipidemia and/or statin therapy.   Direct Measure LDL (HUR235) must be ordered separately in these patients.    PSA 03/27/2024 2.68  0.00 - 4.00 ng/mL Final    [employer] Access chemiluminescent immunoassay. Confirm baseline values for patients being serially monitored.     TSH 3RD GENERATON 03/27/2024 0.261 (L)  0.450 - 4.500 uIU/mL Final    Adult TSH (3rd generation) reference range follows the recommended guidelines of the American Thyroid Association, January, 2020.    Free T4 03/27/2024 1.19 (H)  0.61 - 1.12 ng/dL Final    Specimens with biotin concentrations > 10 ng/mL can lead to significant (> 10%) positive bias in result.

## 2024-10-21 DIAGNOSIS — I26.99 OTHER PULMONARY EMBOLISM WITHOUT ACUTE COR PULMONALE, UNSPECIFIED CHRONICITY (HCC): ICD-10-CM

## 2024-10-21 DIAGNOSIS — E03.9 HYPOTHYROIDISM, UNSPECIFIED TYPE: ICD-10-CM

## 2024-10-21 DIAGNOSIS — I10 HYPERTENSION, UNSPECIFIED TYPE: ICD-10-CM

## 2024-10-21 DIAGNOSIS — F41.9 ANXIETY: ICD-10-CM

## 2024-10-21 NOTE — TELEPHONE ENCOUNTER
Reason for call:   [x] Refill   [] Prior Auth  [x] Other: Established with new PCP - pharmcy requesting the following scripts to be renewed by new PCP . Patient requesting a year supply    Office:   [x] PCP/Provider - Natty Melgar DO   [] Specialty/Provider -     Medication:   metoprolol succinate (TOPROL-XL) 25 mg 24 hr tablet   Dose/Frequency: 25 mg  Quantity: 90    Medication:   ALPRAZolam (XANAX) 0.5 mg tablet   Dose/Frequency: 0.5mg  Quantity: 90    Medication:   levothyroxine 112 mcg tablet   Dose/Frequency: 112 mcg  Quantity: 90    Medication: Xarelto 20 MG tablet   Dose/Frequency: 20mg  Quantity: 90    Pharmacy: [facility] - [address]     Does the patient have enough for 3 days?   [x] Yes   [] No - Send as HP to POD

## 2024-10-23 ENCOUNTER — APPOINTMENT (OUTPATIENT)
Dept: RADIOLOGY | Age: 79
End: 2024-10-23
Payer: COMMERCIAL

## 2024-10-23 DIAGNOSIS — E78.2 MIXED HYPERLIPIDEMIA: ICD-10-CM

## 2024-10-23 DIAGNOSIS — M53.3 COCCYGEAL PAIN: ICD-10-CM

## 2024-10-23 DIAGNOSIS — I10 BENIGN ESSENTIAL HYPERTENSION: ICD-10-CM

## 2024-10-23 PROCEDURE — 72220 X-RAY EXAM SACRUM TAILBONE: CPT

## 2024-10-23 RX ORDER — ALPRAZOLAM 0.5 MG
0.5 TABLET ORAL
Qty: 30 TABLET | Refills: 0 | Status: SHIPPED | OUTPATIENT
Start: 2024-10-23

## 2024-10-23 RX ORDER — LISINOPRIL 20 MG/1
20 TABLET ORAL 2 TIMES DAILY
Qty: 180 TABLET | Refills: 3 | Status: SHIPPED | OUTPATIENT
Start: 2024-10-23

## 2024-10-23 RX ORDER — PRAVASTATIN SODIUM 40 MG
40 TABLET ORAL DAILY
Qty: 90 TABLET | Refills: 3 | Status: SHIPPED | OUTPATIENT
Start: 2024-10-23

## 2024-10-23 RX ORDER — LEVOTHYROXINE SODIUM 112 UG/1
112 TABLET ORAL
Qty: 90 TABLET | Refills: 3 | Status: SHIPPED | OUTPATIENT
Start: 2024-10-23

## 2024-10-23 RX ORDER — METOPROLOL SUCCINATE 25 MG/1
TABLET, EXTENDED RELEASE ORAL
Qty: 90 TABLET | Refills: 3 | Status: SHIPPED | OUTPATIENT
Start: 2024-10-23

## 2024-10-25 ENCOUNTER — HOSPITAL ENCOUNTER (OUTPATIENT)
Dept: RADIOLOGY | Age: 79
Discharge: HOME | End: 2024-10-25
Payer: COMMERCIAL

## 2024-10-25 DIAGNOSIS — I71.21 ANEURYSM OF ASCENDING AORTA WITHOUT RUPTURE (HCC): ICD-10-CM

## 2024-10-25 PROCEDURE — 71250 CT THORAX DX C-: CPT

## 2025-01-08 DIAGNOSIS — F41.9 ANXIETY: ICD-10-CM

## 2025-01-08 NOTE — TELEPHONE ENCOUNTER
Reason for call:   [x] Refill   [] Prior Auth  [] Other:     Office:   [x] PCP/Provider -   [] Specialty/Provider -     Medication: Alprazolam 0.5 mg, take 1 tablet by mouth daily at bedtime       Pharmacy: [facility]     Does the patient have enough for 3 days?   [x] Yes   [] No - Send as HP to POD

## 2025-01-09 RX ORDER — ALPRAZOLAM 0.5 MG
0.5 TABLET ORAL
Qty: 30 TABLET | Refills: 0 | Status: SHIPPED | OUTPATIENT
Start: 2025-01-09

## 2025-02-26 DIAGNOSIS — F41.9 ANXIETY: ICD-10-CM

## 2025-02-26 NOTE — TELEPHONE ENCOUNTER
Reason for call:   [x] Refill   [] Prior Auth  [] Other:     Office:   [x] PCP/Provider - Sona Laguna,   [] Specialty/Provider -     Medication: ALPRAZolam (XANAX) 0.5 mg tablet     Dose/Frequency: Take 1 tablet (0.5 mg total) by mouth daily at bedtime as needed for anxiety     Quantity: 90    Pharmacy: [facility] - [address]     Does the patient have enough for 3 days?   [x] Yes   [] No - Send as HP to POD

## 2025-02-28 RX ORDER — ALPRAZOLAM 0.5 MG
0.5 TABLET ORAL
Qty: 30 TABLET | Refills: 0 | Status: SHIPPED | OUTPATIENT
Start: 2025-02-28

## 2025-03-29 ENCOUNTER — APPOINTMENT (EMERGENCY)
Dept: CT IMAGING | Facility: HOSPITAL | Age: 80
End: 2025-03-29
Payer: COMMERCIAL

## 2025-03-29 ENCOUNTER — APPOINTMENT (EMERGENCY)
Dept: RADIOLOGY | Facility: HOSPITAL | Age: 80
End: 2025-03-29
Payer: COMMERCIAL

## 2025-03-29 ENCOUNTER — APPOINTMENT (OUTPATIENT)
Dept: RADIOLOGY | Age: 80
End: 2025-03-29
Payer: COMMERCIAL

## 2025-03-29 ENCOUNTER — HOSPITAL ENCOUNTER (EMERGENCY)
Facility: HOSPITAL | Age: 80
Discharge: HOME | End: 2025-03-29
Attending: EMERGENCY MEDICINE
Payer: COMMERCIAL

## 2025-03-29 ENCOUNTER — OFFICE VISIT (OUTPATIENT)
Dept: URGENT CARE | Age: 80
End: 2025-03-29
Payer: COMMERCIAL

## 2025-03-29 VITALS
BODY MASS INDEX: 27.55 KG/M2 | TEMPERATURE: 97.8 F | RESPIRATION RATE: 18 BRPM | OXYGEN SATURATION: 93 % | HEART RATE: 59 BPM | DIASTOLIC BLOOD PRESSURE: 83 MMHG | SYSTOLIC BLOOD PRESSURE: 143 MMHG | WEIGHT: 181.22 LBS

## 2025-03-29 VITALS
RESPIRATION RATE: 18 BRPM | TEMPERATURE: 97.8 F | DIASTOLIC BLOOD PRESSURE: 98 MMHG | SYSTOLIC BLOOD PRESSURE: 182 MMHG | HEART RATE: 63 BPM | OXYGEN SATURATION: 98 %

## 2025-03-29 DIAGNOSIS — I10 BENIGN ESSENTIAL HYPERTENSION: ICD-10-CM

## 2025-03-29 DIAGNOSIS — R07.9 CHEST PAIN, UNSPECIFIED TYPE: Primary | ICD-10-CM

## 2025-03-29 DIAGNOSIS — M54.9 ACUTE BACK PAIN, UNSPECIFIED BACK LOCATION, UNSPECIFIED BACK PAIN LATERALITY: ICD-10-CM

## 2025-03-29 DIAGNOSIS — I71.21 ASCENDING AORTIC ANEURYSM, UNSPECIFIED WHETHER RUPTURED (HCC): ICD-10-CM

## 2025-03-29 DIAGNOSIS — I71.9 AORTIC ANEURYSM (HCC): ICD-10-CM

## 2025-03-29 DIAGNOSIS — R07.9 CHEST PAIN: Primary | ICD-10-CM

## 2025-03-29 DIAGNOSIS — R07.9 CHEST PAIN, UNSPECIFIED TYPE: ICD-10-CM

## 2025-03-29 LAB
2HR DELTA HS TROPONIN: -1 NG/L
ALBUMIN SERPL BCG-MCNC: 4.5 G/DL (ref 3.5–5)
ALP SERPL-CCNC: 64 U/L (ref 34–104)
ALT SERPL W P-5'-P-CCNC: 14 U/L (ref 7–52)
ANION GAP SERPL CALCULATED.3IONS-SCNC: 8 MMOL/L (ref 4–13)
AST SERPL W P-5'-P-CCNC: 21 U/L (ref 13–39)
BASOPHILS # BLD AUTO: 0.07 THOUSANDS/ÂΜL (ref 0–0.1)
BASOPHILS NFR BLD AUTO: 1 % (ref 0–1)
BILIRUB SERPL-MCNC: 1.71 MG/DL (ref 0.2–1)
BUN SERPL-MCNC: 21 MG/DL (ref 5–25)
CALCIUM SERPL-MCNC: 9.2 MG/DL (ref 8.4–10.2)
CARDIAC TROPONIN I PNL SERPL HS: 15 NG/L (ref ?–50)
CARDIAC TROPONIN I PNL SERPL HS: 16 NG/L (ref ?–50)
CHLORIDE SERPL-SCNC: 103 MMOL/L (ref 96–108)
CO2 SERPL-SCNC: 27 MMOL/L (ref 21–32)
CREAT SERPL-MCNC: 0.8 MG/DL (ref 0.6–1.3)
EOSINOPHIL # BLD AUTO: 0.14 THOUSAND/ÂΜL (ref 0–0.61)
EOSINOPHIL NFR BLD AUTO: 2 % (ref 0–6)
ERYTHROCYTE [DISTWIDTH] IN BLOOD BY AUTOMATED COUNT: 13 % (ref 11.6–15.1)
GFR SERPL CREATININE-BSD FRML MDRD: 83 ML/MIN/1.73SQ M
GLUCOSE SERPL-MCNC: 74 MG/DL (ref 65–140)
GLUCOSE SERPL-MCNC: 89 MG/DL (ref 65–140)
HCT VFR BLD AUTO: 45 % (ref 36.5–49.3)
HGB BLD-MCNC: 15.3 G/DL (ref 12–17)
IMM GRANULOCYTES # BLD AUTO: 0.02 THOUSAND/UL (ref 0–0.2)
IMM GRANULOCYTES NFR BLD AUTO: 0 % (ref 0–2)
LYMPHOCYTES # BLD AUTO: 2.44 THOUSANDS/ÂΜL (ref 0.6–4.47)
LYMPHOCYTES NFR BLD AUTO: 33 % (ref 14–44)
MCH RBC QN AUTO: 32.2 PG (ref 26.8–34.3)
MCHC RBC AUTO-ENTMCNC: 34 G/DL (ref 31.4–37.4)
MCV RBC AUTO: 95 FL (ref 82–98)
MONOCYTES # BLD AUTO: 0.55 THOUSAND/ÂΜL (ref 0.17–1.22)
MONOCYTES NFR BLD AUTO: 7 % (ref 4–12)
NEUTROPHILS # BLD AUTO: 4.22 THOUSANDS/ÂΜL (ref 1.85–7.62)
NEUTS SEG NFR BLD AUTO: 57 % (ref 43–75)
NRBC BLD AUTO-RTO: 0 /100 WBCS
PLATELET # BLD AUTO: 225 THOUSANDS/UL (ref 149–390)
PMV BLD AUTO: 10.2 FL (ref 8.9–12.7)
POTASSIUM SERPL-SCNC: 4 MMOL/L (ref 3.5–5.3)
PROT SERPL-MCNC: 7.2 G/DL (ref 6.4–8.4)
RBC # BLD AUTO: 4.75 MILLION/UL (ref 3.88–5.62)
SODIUM SERPL-SCNC: 138 MMOL/L (ref 135–147)
WBC # BLD AUTO: 7.44 THOUSAND/UL (ref 4.31–10.16)

## 2025-03-29 PROCEDURE — 36415 COLL VENOUS BLD VENIPUNCTURE: CPT

## 2025-03-29 PROCEDURE — 84484 ASSAY OF TROPONIN QUANT: CPT

## 2025-03-29 PROCEDURE — 93005 ELECTROCARDIOGRAM TRACING: CPT | Performed by: PHYSICIAN ASSISTANT

## 2025-03-29 PROCEDURE — 82948 REAGENT STRIP/BLOOD GLUCOSE: CPT | Performed by: PHYSICIAN ASSISTANT

## 2025-03-29 PROCEDURE — 74174 CTA ABD&PLVS W/CONTRAST: CPT

## 2025-03-29 PROCEDURE — 85025 COMPLETE CBC W/AUTO DIFF WBC: CPT

## 2025-03-29 PROCEDURE — 93005 ELECTROCARDIOGRAM TRACING: CPT

## 2025-03-29 PROCEDURE — 71275 CT ANGIOGRAPHY CHEST: CPT

## 2025-03-29 PROCEDURE — 71046 X-RAY EXAM CHEST 2 VIEWS: CPT

## 2025-03-29 PROCEDURE — 99214 OFFICE O/P EST MOD 30 MIN: CPT | Performed by: PHYSICIAN ASSISTANT

## 2025-03-29 PROCEDURE — 99285 EMERGENCY DEPT VISIT HI MDM: CPT

## 2025-03-29 PROCEDURE — 80053 COMPREHEN METABOLIC PANEL: CPT

## 2025-03-29 PROCEDURE — 99285 EMERGENCY DEPT VISIT HI MDM: CPT | Performed by: EMERGENCY MEDICINE

## 2025-03-29 RX ADMIN — IOHEXOL 70 ML: 350 INJECTION, SOLUTION INTRAVENOUS at 18:36

## 2025-03-29 NOTE — PROGRESS NOTES
5:28 pm:  Just reading over KAVEH Velázquez's chart and called pt.  - pt. Is having back pain, and when I reviewed his PMH, I saw he had a hx of an ascending aortic anneursym and I spoke to the pt. And his daughter in law, Francheska, and I advised them both to go the nearest ER or call 911 to r/o a dissection.  Both understood and said that they would go to [place].

## 2025-03-29 NOTE — PROGRESS NOTES
[facility] Now        NAME: Abraham Castillo Jr. is a 79 y.o. male  : 1945    MRN: [id number]  DATE: 2025  TIME: 2:12 PM      Assessment and Plan     Chest pain, unspecified type [R07.9]  1. Chest pain, unspecified type  ECG 12 lead    XR chest pa and lateral      2. Benign essential hypertension          Pt denies any current chest or back pain at time of visit, symptoms have subsided. I discussed BP recheck/follow up with pt and also discussed close ER precautions if pt symptoms reoccur. Pt voiced his understanding of my instructions    EKG shows nonspecific T wave changes. EKG also shows sinus bradycardia      POC Testing Results        Note:       Patient Instructions     Patient Instructions   Please follow up with family doctor for elevated blood pressure in the next 1 week    If your chest or back pain reoccurs please go to the ER immediately     Follow up with primary care provider.   Go to ER if symptoms worsen.    Chief Complaint     Chief Complaint   Patient presents with    Chest Pain     Pt presents with central chest pain radiating to his back. Began approx one hour ago. Recently took xanax          History of Present Illness     Pt reports left sided chest pain that began 1 hour ago and radiates into his left upper back. Pt denies any current pain at time of exam, reports chest pain subsided about 15 minutes ago, prior to EKG. He denies any SOB, cough, congestion, fever, n/v/d, abdominal pain or leg pain. Pt denies changes with exertion, deep breath or positional changes. Pt has history of HTN, smoking and Afib. Pt reports taking BP medication today but reports he can get anxious and this can cause his BP to elevate. He denies any headache or changes in vision. He reports he also took his Xanax at 8 AM this morning prior to symptoms.        Review of Systems     Review of Systems   Constitutional:  Negative for diaphoresis and fever.   HENT:  Negative for congestion.     Respiratory:  Negative for cough and shortness of breath.    Cardiovascular:  Positive for chest pain. Negative for leg swelling.   Gastrointestinal:  Negative for abdominal pain, diarrhea, nausea and vomiting.   Skin:  Negative for rash.   Neurological:  Negative for syncope.         Current Medications       Current Outpatient Medications:     levothyroxine 112 mcg tablet, Take 1 tablet (112 mcg total) by mouth daily in the early morning, Disp: 90 tablet, Rfl: 3    lisinopril (ZESTRIL) 20 mg tablet, Take 1 tablet (20 mg total) by mouth 2 (two) times a day, Disp: 180 tablet, Rfl: 3    metoprolol succinate (TOPROL-XL) 25 mg 24 hr tablet, TAKE 1 TABLET DAILY, Disp: 90 tablet, Rfl: 3    pravastatin (PRAVACHOL) 40 mg tablet, Take 1 tablet (40 mg total) by mouth daily, Disp: 90 tablet, Rfl: 3    rivaroxaban (Xarelto) 20 mg tablet, Take 1 tablet (20 mg total) by mouth daily, Disp: 90 tablet, Rfl: 3    ALPRAZolam (XANAX) 0.5 mg tablet, Take 1 tablet (0.5 mg total) by mouth daily at bedtime as needed for anxiety, Disp: 30 tablet, Rfl: 0    Current Allergies     Allergies as of 03/29/2025 - Reviewed 03/29/2025   Allergen Reaction Noted    Percocet [oxycodone-acetaminophen] Seizures 09/20/2016    Penicillins  07/25/2016    Percocet [oxycodone-acetaminophen] Seizures 08/09/2022              The following portions of the patient's history were reviewed and updated as appropriate: allergies, current medications, past family history, past medical history, past social history, past surgical history, and problem list.     Past Medical History:   Diagnosis Date    A-fib (HCC) 11/2002    Anxiety 01/15/2002    Aortic aneurysm (HCC) 08/26/2016    Arthritis     neck    Atrial fibrillation (HCC)     last assessed: 7/26/2017    Cancer ([facility])     skin, thyroid     Disease of thyroid gland     Disorder of epididymis     H/O malignant neoplasm of thyroid     Hyperlipidemia     last assessed: 6/29/2017    Hypertension     Left leg DVT ([facility])  03/07/2017    Osteopenia     Psychiatric disorder     Pulmonary embolism (HCC)     last assessed: 7/26/2017       Past Surgical History:   Procedure Laterality Date    APPENDECTOMY      BASAL CELL CARCINOMA EXCISION      neck    CATARACT EXTRACTION  03/23/2022    right eye    CATARACT EXTRACTION  04/13/2022    left eye    COLONOSCOPY  08/17/2021    complete    CYSTOSCOPY  02/01/2016    diagnostic    LEG SURGERY Left     quadricep    SQUAMOUS CELL CARCINOMA EXCISION      chest area, right leg    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        Objective     BP (!) 182/98   Pulse 63   Temp 97.8 °F (36.6 °C)   Resp 18   SpO2 98%   No LMP for male patient.         Physical Exam     Physical Exam  Vitals and nursing note reviewed.   Constitutional:       General: He is not in acute distress.     Appearance: He is well-developed. He is not ill-appearing, toxic-appearing or diaphoretic.   HENT:      Head: Normocephalic and atraumatic.   Eyes:      Extraocular Movements: Extraocular movements intact.   Cardiovascular:      Rate and Rhythm: Normal rate and regular rhythm.      Heart sounds: Normal heart sounds.   Pulmonary:      Effort: Pulmonary effort is normal. No tachypnea, accessory muscle usage or respiratory distress.      Breath sounds: Normal breath sounds.   Chest:      Chest wall: No mass or tenderness.   Abdominal:      General: Bowel sounds are normal.      Palpations: Abdomen is soft. There is no mass.   Musculoskeletal:      Comments: Nontender over bilateral calves, no edema noted. Pt nontender to palpation over back   Skin:     General: Skin is warm and dry.   Neurological:      General: No focal deficit present.      Mental Status: He is alert and oriented to  person, place, and time.   Psychiatric:         Mood and Affect: Mood normal.         Behavior: Behavior normal.

## 2025-03-29 NOTE — ED PROVIDER NOTES
Time reflects when diagnosis was documented in both MDM as applicable and the Disposition within this note       Time User Action Codes Description Comment    3/29/2025  9:16 PM Emre Brothers Add [R07.9] Chest pain     3/29/2025  9:16 PM Emre Brothers Add [I71.9] Aortic aneurysm (HCC)           ED Disposition       ED Disposition   Discharge    Condition   Stable    Date/Time   Sat Mar 29, 2025  9:16 PM    Comment   Abraham Castillo Jr. discharge to home/self care.                   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 Currently chest pain free. Only reporting some minimal back pain.    Differential diagnoses included but not limited to: Dissection, aortic aneurysm, musculoskeletal pain, ACS, stable angina, anxiety.    Orders written for labs, EKG and CT Dissection study.    Patient offered medications for pain, but declined.    See ED course for full details.     Labs unremarkable for acute actionable findings.  Initial troponin noted to be 16, will repeat.  Repeat troponin 15.    CTA negative for acute dissection, reported unchanged 4.5 cm thoracic aortic aneurysm.    Patient appears well, is in no acute distress, is comfortable discharge at this time.  Advised the patient on the emergent signs and symptoms for which she should urgently return to the ED and encouraged continued follow-up with PCP within 1 week for reevaluation of symptoms.  Discussed with patient that he will be referred to vascular surgery for further evaluation.    Patient states he has met with vascular surgery in the network before, but given his advanced age, they ultimately decided that they would not do any additional interventions at  this time.  Patient and daughter-in-law at the bedside verbalized understanding of plan of care, given the opportunity to ask questions.       Amount and/or Complexity of Data Reviewed  Labs: ordered. Decision-making details documented in ED Course.  Radiology: ordered. Decision-making details documented in ED Course.    Risk  Prescription drug management.        ED Course as of 03/29/25 2214   Sat Mar 29, 2025   1920 Comprehensive metabolic panel(!)  Reassuring. No IGNACIA, evidence of end organ damage, electrolyte derangement.    1920 CBC and differential  Reassuring. No anemia, thrombocytopenia, leukocytosis.    1920 hs TnI 0hr: 16   1952 CTA dissection protocol chest/abdomen/pelvis  IMPRESSION:     1.  No identifiable acute abnormality to account for the patient's clinical presentation. Unchanged 4.5 cm thoracic aortic aneurysm.     2.  Please refer to the report body for description of other incidental, chronic and/or benign findings.              Workstation performed: [id number]     2207 hs TnI 2hr: 15       Medications   iohexol (OMNIPAQUE) 350 MG/ML injection (MULTI-DOSE) 70 mL (70 mL Intravenous Given 3/29/25 1836)       ED Risk Strat Scores                                                History of Present Illness       Chief Complaint   Patient presents with    Chest Pain     Patient presents for chest pain around noon, that is now lower back pain. Patient has hx of AAA.        Past Medical History:   Diagnosis Date    A-fib (HCC) 11/2002    Anxiety 01/15/2002    Aortic aneurysm (HCC) 08/26/2016    Arthritis     neck    Atrial fibrillation (HCC)     last assessed: 7/26/2017    Cancer (HCC)     skin, thyroid     Disease of thyroid gland     Disorder of epididymis     H/O malignant neoplasm of thyroid     Hyperlipidemia     last assessed: 6/29/2017    Hypertension     Left leg DVT (HCC) 03/07/2017    Osteopenia     Psychiatric disorder     Pulmonary embolism (HCC)     last assessed: 7/26/2017      Past Surgical  History:   Procedure Laterality Date    APPENDECTOMY      BASAL CELL CARCINOMA EXCISION      neck    CATARACT EXTRACTION  2022    right eye    CATARACT EXTRACTION  2022    left eye    COLONOSCOPY  2021    complete    CYSTOSCOPY  2016    diagnostic    LEG SURGERY Left     quadricep    SQUAMOUS CELL CARCINOMA EXCISION      chest area, right leg    TOTAL THYROIDECTOMY  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      Family History   Problem Relation Age of Onset    Coronary artery disease Father     Heart attack Father         acute myocardial infarction    Stroke Mother     Hypertension Mother       Social History     Tobacco Use    Smoking status: Former     Current packs/day: 0.00     Types: Cigarettes     Quit date:      Years since quittin.2    Smokeless tobacco: Never   Vaping Use    Vaping status: Never Used   Substance Use Topics    Alcohol use: No    Drug use: No      E-Cigarette/Vaping    E-Cigarette Use Never User       E-Cigarette/Vaping Substances    Nicotine No     THC No     CBD No     Flavoring No       I have reviewed and agree with the history as documented.     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 Patient states earlier this morning at approximately 10 am he developed left sided chest pain, which he felt radiated into his back. Patient states he was initially evaluated at urgent care, who did an EKG and a CXR. Given concern for known aortic aneurysm, patient was sent to this ED for further evaluation. Patient states his pain has since resolved since before he got to urgent care. He states the pain in his chest felt like a pulled muscle, but denies any recent overuse or heavy lifting. Patient does  report that his wife passed approximately 1 month ago, for which he has been taking Xanax PRN for increased anxiety. Daughter-in-law Francheska at the bedside states that the patient has been frequently more upset and anxious since his wife's passing. Patient states he has otherwise felt in his normal state of health, denying any fevers, chills, difficulty breathing, abdominal pain, dizziness, lightheadedness, nausea, vomiting, leg swelling, urinary symptoms, diarrhea. Patient denies any current chest pain, states he only has some minimal left mid/upper back pain.     Review of Systems   All other systems reviewed and are negative.          Objective       ED Triage Vitals   Temperature Pulse Blood Pressure Respirations SpO2 Patient Position - Orthostatic VS   03/29/25 1754 03/29/25 1754 03/29/25 1754 03/29/25 1754 03/29/25 1754 03/29/25 1754   97.8 °F (36.6 °C) 61 (!) 187/96 18 96 % Lying      Temp src Heart Rate Source BP Location FiO2 (%) Pain Score    -- 03/29/25 1754 03/29/25 1754 -- 03/29/25 1948     Monitor Left arm  No Pain      Vitals      Date and Time Temp Pulse SpO2 Resp BP Pain Score FACES Pain Rating User   03/29/25 1948 -- 59 93 % 18 143/83 No Pain -- JA   03/29/25 1754 97.8 °F (36.6 °C) 61 96 % 18 187/96 -- --             Physical Exam  Vitals (Hypertensive. No tachycardia, fevers) reviewed.   Constitutional:       General: He is not in acute distress.     Appearance: He is well-developed. He is not ill-appearing, toxic-appearing or diaphoretic.   Cardiovascular:      Rate and Rhythm: Normal rate and regular rhythm.      Heart sounds: No murmur heard.  Pulmonary:      Effort: Pulmonary effort is normal. No accessory muscle usage or respiratory distress.      Breath sounds: No decreased breath sounds, wheezing, rhonchi or rales.   Chest:      Chest wall: No tenderness.      Comments: No reproducible chest wall tenderness.   Abdominal:      Palpations: Abdomen is soft.      Tenderness: There is no  abdominal tenderness.   Skin:     General: Skin is warm.   Neurological:      Mental Status: He is alert and oriented to person, place, and time.   Psychiatric:         Mood and Affect: Mood normal.         Behavior: Behavior normal.         Results Reviewed       Procedure Component Value Units Date/Time    HS Troponin I 2hr [[id number]]  (Normal) Collected: 03/29/25 2026    Lab Status: Final result Specimen: Blood from Arm, Right Updated: 03/29/25 2102     hs TnI 2hr 15 ng/L      Delta 2hr hsTnI -1 ng/L     HS Troponin I 4hr [[id number]]     Lab Status: No result Specimen: Blood     Comprehensive metabolic panel [[id number]]  (Abnormal) Collected: 03/29/25 1821    Lab Status: Final result Specimen: Blood from Arm, Right Updated: 03/29/25 1918     Sodium 138 mmol/L      Potassium 4.0 mmol/L      Chloride 103 mmol/L      CO2 27 mmol/L      ANION GAP 8 mmol/L      BUN 21 mg/dL      Creatinine 0.80 mg/dL      Glucose 74 mg/dL      Calcium 9.2 mg/dL      AST 21 U/L      ALT 14 U/L      Alkaline Phosphatase 64 U/L      Total Protein 7.2 g/dL      Albumin 4.5 g/dL      Total Bilirubin 1.71 mg/dL      eGFR 83 ml/min/1.73sq m     Narrative:      National Kid[employer] Foundation guidelines for Chronic Kidney Disease (CKD):     Stage 1 with normal or high GFR (GFR > 90 mL/min/1.73 square meters)    Stage 2 Mild CKD (GFR = 60-89 mL/min/1.73 square meters)    Stage 3A Moderate CKD (GFR = 45-59 mL/min/1.73 square meters)    Stage 3B Moderate CKD (GFR = 30-44 mL/min/1.73 square meters)    Stage 4 Severe CKD (GFR = 15-29 mL/min/1.73 square meters)    Stage 5 End Stage CKD (GFR <15 mL/min/1.73 square meters)  Note: GFR calculation is accurate only with a steady state creatinine    HS Troponin 0hr (reflex protocol) [[id number]]  (Normal) Collected: 03/29/25 1821    Lab Status: Final result Specimen: Blood from Arm, Right Updated: 03/29/25 1906     hs TnI 0hr 16 ng/L     CBC and differential [[id number]] Collected: 03/29/25 1821    Lab  Status: Final result Specimen: Blood from Arm, Right Updated: 03/29/25 1827     WBC 7.44 Thousand/uL      RBC 4.75 Million/uL      Hemoglobin 15.3 g/dL      Hematocrit 45.0 %      MCV 95 fL      MCH 32.2 pg      MCHC 34.0 g/dL      RDW 13.0 %      MPV 10.2 fL      Platelets 225 Thousands/uL      nRBC 0 /100 WBCs      Segmented % 57 %      Immature Grans % 0 %      Lymphocytes % 33 %      Monocytes % 7 %      Eosinophils Relative 2 %      Basophils Relative 1 %      Absolute Neutrophils 4.22 Thousands/µL      Absolute Immature Grans 0.02 Thousand/uL      Absolute Lymphocytes 2.44 Thousands/µL      Absolute Monocytes 0.55 Thousand/µL      Eosinophils Absolute 0.14 Thousand/µL      Basophils Absolute 0.07 Thousands/µL             CTA dissection protocol chest/abdomen/pelvis   Final Interpretation by Yobani Dewitt MD (03/29 1937)      1.  No identifiable acute abnormality to account for the patient's clinical presentation. Unchanged 4.5 cm thoracic aortic aneurysm.      2.  Please refer to the report body for description of other incidental, chronic and/or benign findings.               Workstation performed: [id number]             ECG 12 Lead Documentation Only    Date/Time: 3/29/2025 10:08 PM    Performed by: Emre MOTAT DO  Authorized by: Emre MOTTA DO    Indications / Diagnosis:  Chest pain  ECG reviewed by me, the ED Provider: yes    Patient location:  ED  Previous ECG:     Previous ECG:  Compared to current    Similarity:  Changes noted  Interpretation:     Interpretation: non-specific    Rate:     ECG rate:  67    ECG rate assessment: normal    Rhythm:     Rhythm: sinus rhythm    Ectopy:     Ectopy: none    QRS:     QRS axis:  Normal    QRS intervals:  Normal  Conduction:     Conduction: abnormal      Abnormal conduction: 1st degree    ST segments:     ST segments:  Normal  T waves:     T waves: normal    Comments:      Prolonged NC       ED Medication and Procedure Management   Prior to  Admission Medications   Prescriptions Last Dose Informant Patient Reported? Taking?   ALPRAZolam (XANAX) 0.5 mg tablet   No No   Sig: Take 1 tablet (0.5 mg total) by mouth daily at bedtime as needed for anxiety   levothyroxine 112 mcg tablet   No No   Sig: Take 1 tablet (112 mcg total) by mouth daily in the early morning   lisinopril (ZESTRIL) 20 mg tablet   No No   Sig: Take 1 tablet (20 mg total) by mouth 2 (two) times a day   metoprolol succinate (TOPROL-XL) 25 mg 24 hr tablet   No No   Sig: TAKE 1 TABLET DAILY   pravastatin (PRAVACHOL) 40 mg tablet   No No   Sig: Take 1 tablet (40 mg total) by mouth daily   rivaroxaban (Xarelto) 20 mg tablet   No No   Sig: Take 1 tablet (20 mg total) by mouth daily      Facility-Administered Medications: None     Discharge Medication List as of 3/29/2025  9:17 PM        CONTINUE these medications which have NOT CHANGED    Details   ALPRAZolam (XANAX) 0.5 mg tablet Take 1 tablet (0.5 mg total) by mouth daily at bedtime as needed for anxiety, Starting Fri 2/28/2025, Normal      levothyroxine 112 mcg tablet Take 1 tablet (112 mcg total) by mouth daily in the early morning, Starting Wed 10/23/2024, Normal      lisinopril (ZESTRIL) 20 mg tablet Take 1 tablet (20 mg total) by mouth 2 (two) times a day, Starting Wed 10/23/2024, Normal      metoprolol succinate (TOPROL-XL) 25 mg 24 hr tablet TAKE 1 TABLET DAILY, Normal      pravastatin (PRAVACHOL) 40 mg tablet Take 1 tablet (40 mg total) by mouth daily, Starting Wed 10/23/2024, Normal      rivaroxaban (Xarelto) 20 mg tablet Take 1 tablet (20 mg total) by mouth daily, Starting Wed 10/23/2024, Normal             ED SEPSIS DOCUMENTATION   Time reflects when diagnosis was documented in both MDM as applicable and the Disposition within this note       Time User Action Codes Description Comment    3/29/2025  9:16 PM Emre Brothers Add [R07.9] Chest pain     3/29/2025  9:16 PM Emre Brothers [I71.9] Aortic aneurysm (HCC)                   Emre Brothers V, DO  03/29/25 2217

## 2025-03-29 NOTE — PATIENT INSTRUCTIONS
Please follow up with family doctor for elevated blood pressure in the next 1 week    If your chest or back pain reoccurs please go to the ER immediately

## 2025-03-31 ENCOUNTER — VBI (OUTPATIENT)
Dept: INTERNAL MEDICINE CLINIC | Age: 80
End: 2025-03-31

## 2025-03-31 LAB
ATRIAL RATE: 54 BPM
P AXIS: 65 DEGREES
PR INTERVAL: 228 MS
QRS AXIS: 53 DEGREES
QRSD INTERVAL: 92 MS
QT INTERVAL: 444 MS
QTC INTERVAL: 421 MS
T WAVE AXIS: 56 DEGREES
VENTRICULAR RATE: 54 BPM

## 2025-03-31 PROCEDURE — 93010 ELECTROCARDIOGRAM REPORT: CPT | Performed by: INTERNAL MEDICINE

## 2025-03-31 NOTE — TELEPHONE ENCOUNTER
03/31/25 9:42 AM    Patient contacted post ED visit, [facility] department spoke with patient/caregiver and outreach was successful.    Thank you.  Farzaneh Galvin MA  [facility]

## 2025-04-02 LAB
ATRIAL RATE: 67 BPM
P AXIS: 69 DEGREES
PR INTERVAL: 216 MS
QRS AXIS: 78 DEGREES
QRSD INTERVAL: 94 MS
QT INTERVAL: 406 MS
QTC INTERVAL: 429 MS
T WAVE AXIS: 54 DEGREES
VENTRICULAR RATE: 67 BPM

## 2025-04-02 PROCEDURE — 93010 ELECTROCARDIOGRAM REPORT: CPT | Performed by: STUDENT IN AN ORGANIZED HEALTH CARE EDUCATION/TRAINING PROGRAM

## 2025-04-07 DIAGNOSIS — F41.9 ANXIETY: ICD-10-CM

## 2025-04-07 NOTE — TELEPHONE ENCOUNTER
Reason for call: Patient is requesting a 90 days supply sent to mail order pharmacy  [x] Refill   [] Prior Auth  [] Other:     Office:   [x] PCP/Provider - Natty Melgar DO / [facility]   [] Specialty/Provider -     Medication: ALPRAZolam (XANAX) 0.5 mg tablet     Dose/Frequency:  Take 1 tablet (0.5 mg total) by mouth daily at bedtime as needed for anxiety     Quantity: 90    Pharmacy: [facility] [address] [facility]   Does the patient have enough for 3 days?   [] Yes   [] No - Send as HP to POD    Mail Away Pharmacy   Does the patient have enough for 10 days?   [] Yes   [x] No - Send as HP to POD

## 2025-04-08 RX ORDER — ALPRAZOLAM 0.5 MG
0.5 TABLET ORAL
Qty: 30 TABLET | Refills: 0 | Status: SHIPPED | OUTPATIENT
Start: 2025-04-08

## 2025-04-22 ENCOUNTER — RA CDI HCC (OUTPATIENT)
Dept: OTHER | Facility: HOSPITAL | Age: 80
End: 2025-04-22

## 2025-04-22 NOTE — PROGRESS NOTES
HCC coding opportunities          Chart Reviewed number of suggestions sent to Provider: 3    All on problem list:  I49.5  I82.442  I26.99     Patients Insurance     Medicare Insurance: [employer]

## 2025-04-25 ENCOUNTER — OFFICE VISIT (OUTPATIENT)
Dept: INTERNAL MEDICINE CLINIC | Age: 80
End: 2025-04-25
Payer: COMMERCIAL

## 2025-04-25 VITALS
BODY MASS INDEX: 26.34 KG/M2 | DIASTOLIC BLOOD PRESSURE: 82 MMHG | WEIGHT: 173.8 LBS | HEART RATE: 64 BPM | TEMPERATURE: 97.2 F | HEIGHT: 68 IN | OXYGEN SATURATION: 98 % | SYSTOLIC BLOOD PRESSURE: 126 MMHG

## 2025-04-25 DIAGNOSIS — E55.9 VITAMIN D DEFICIENCY: ICD-10-CM

## 2025-04-25 DIAGNOSIS — I10 BENIGN ESSENTIAL HYPERTENSION: ICD-10-CM

## 2025-04-25 DIAGNOSIS — F41.9 ANXIETY: ICD-10-CM

## 2025-04-25 DIAGNOSIS — R73.03 PREDIABETES: ICD-10-CM

## 2025-04-25 DIAGNOSIS — I48.0 PAROXYSMAL ATRIAL FIBRILLATION (HCC): Primary | ICD-10-CM

## 2025-04-25 DIAGNOSIS — I71.21 ANEURYSM OF ASCENDING AORTA WITHOUT RUPTURE (HCC): ICD-10-CM

## 2025-04-25 DIAGNOSIS — E89.0 POSTPROCEDURAL HYPOTHYROIDISM: ICD-10-CM

## 2025-04-25 DIAGNOSIS — K21.9 GASTROESOPHAGEAL REFLUX DISEASE WITHOUT ESOPHAGITIS: ICD-10-CM

## 2025-04-25 DIAGNOSIS — Z12.5 ENCOUNTER FOR SCREENING FOR MALIGNANT NEOPLASM OF PROSTATE: ICD-10-CM

## 2025-04-25 DIAGNOSIS — E78.2 MIXED HYPERLIPIDEMIA: ICD-10-CM

## 2025-04-25 PROCEDURE — 99214 OFFICE O/P EST MOD 30 MIN: CPT | Performed by: INTERNAL MEDICINE

## 2025-04-25 PROCEDURE — G2211 COMPLEX E/M VISIT ADD ON: HCPCS | Performed by: INTERNAL MEDICINE

## 2025-04-25 RX ORDER — ALPRAZOLAM 0.5 MG
0.5 TABLET ORAL
Qty: 30 TABLET | Refills: 0 | Status: SHIPPED | OUTPATIENT
Start: 2025-04-25

## 2025-04-25 NOTE — PROGRESS NOTES
Name: Abraham Castillo Jr.      : 1945      MRN: [id number]  Encounter Provider: Natty Melgar DO  Encounter Date: 2025   Encounter department: [facility]  :  Assessment & Plan  Paroxysmal atrial fibrillation (HCC)  - well controlled   - continue with metoprolol and rivaroxaban    Orders:    PSA, Total Screen; Future    CBC and differential; Future    TSH, 3rd generation with Free T4 reflex; Future    Comprehensive metabolic panel; Future    Lipid panel; Future    Benign essential hypertension   - blood pressure is well controlled   -continue with metoprolol and lisinopril  -continue with a low-salt diet and with exercise    Orders:    PSA, Total Screen; Future    CBC and differential; Future    TSH, 3rd generation with Free T4 reflex; Future    Comprehensive metabolic panel; Future    Lipid panel; Future    Aneurysm of ascending aorta without rupture (HCC)  - Stable at recent CT chest dissection protocol done last month  - We will continue to monitor patient clinically and with imaging.  Orders:    PSA, Total Screen; Future    CBC and differential; Future    TSH, 3rd generation with Free T4 reflex; Future    Comprehensive metabolic panel; Future    Lipid panel; Future    Gastroesophageal reflux disease without esophagitis  -Well-controlled  -Continue to avoid diets and behaviors that trigger symptoms    Orders:    PSA, Total Screen; Future    CBC and differential; Future    TSH, 3rd generation with Free T4 reflex; Future    Comprehensive metabolic panel; Future    Lipid panel; Future    Postprocedural hypothyroidism  - well controlled, last TSH done on 2024 was normal at 1.020  -continue with levothyroxine at current dose    Orders:    PSA, Total Screen; Future    CBC and differential; Future    TSH, 3rd generation with Free T4 reflex; Future    Comprehensive metabolic panel; Future    Lipid panel; Future    Mixed hyperlipidemia  -Lipids are stable  - Continue  with pravastatin  -continue with a heart healthy diet  -continue with exercise    Orders:    PSA, Total Screen; Future    CBC and differential; Future    TSH, 3rd generation with Free T4 reflex; Future    Comprehensive metabolic panel; Future    Lipid panel; Future    Vitamin D deficiency  - Stable  - continue with vitamin D supplement  Orders:    PSA, Total Screen; Future    CBC and differential; Future    TSH, 3rd generation with Free T4 reflex; Future    Comprehensive metabolic panel; Future    Lipid panel; Future    Vitamin D 25 hydroxy; Future    Anxiety  - Currently mildly worsened secondary to life stressors including the loss of his wife  - continue with as needed alprazolam, which we will refill today as requested  - I discussed with patient that benzodiazepines are not really recommended at his age because of the side effects that can come with them and that we will continue with his prescription because of his long-term use only.  - Will refill alprazolam as requested  - The [facility] website was queried and did not show misuse  - Continue to follow with bereavement group    Orders:    ALPRAZolam (XANAX) 0.5 mg tablet; Take 1 tablet (0.5 mg total) by mouth daily at bedtime as needed for anxiety    PSA, Total Screen; Future    CBC and differential; Future    TSH, 3rd generation with Free T4 reflex; Future    Comprehensive metabolic panel; Future    Lipid panel; Future    Encounter for screening for malignant neoplasm of prostate  -We will order a PSA and follow-up with the results  Orders:    PSA, Total Screen; Future    Prediabetes  -Stable  - Continue with low carbohydrate and low sugar diet as well as exercise  - Will order hemoglobin A1c and follow-up with the results  Orders:    Hemoglobin A1C; Future           History of Present Illness     HPI    Patient presents for a follow-up visit regarding his paroxysmal atrial fibrillation, hypertension, thoracic aneurysm, GERD, hypothyroidism,  hyperlipidemia, vitamin D deficiency and anxiety.  He states that he has been taking his medications as prescribed.  Today pt complains that he has been having some worsening anxiety because his wife just  2 months ago and he may have to move cos his son wants to downsize.  He states that he has been on alprazolam for 22 years with anxiety and states that he does not need any other daily medications   He takes the xanax before he sleeps - states that he used to get 90 days prescription of xanax for  years and [facility].  Was at the ed a few days ago for chest pain and back pain and he was told to get another ct scan to monitor his thoracic aneurysm but that remained stable.  Chest pain and back pain have now resolved   No heart burn   He is in a bereavement group which helps.  He was  for 56 years  He admits to dysphoria and anxiety but denies any fever, chills, night sweats, headache, dizziness, nasal congestion, runny nose, pnd, sore throat, ear ache, sinus pain or pressure, wheezing, cough, chest pain, sob, palpitations, nausea, vomiting, diarrhea, constipation, hematochezia, hematuria, melena stools, arthralgias, myalgias or insomnia.        Review of Systems   Constitutional:  Negative for activity change, chills, fatigue, fever and unexpected weight change.   HENT:  Negative for ear pain, postnasal drip, rhinorrhea, sinus pressure and sore throat.    Eyes:  Negative for pain.   Respiratory:  Negative for cough, choking, chest tightness, shortness of breath and wheezing.    Cardiovascular:  Negative for chest pain, palpitations and leg swelling.   Gastrointestinal:  Negative for abdominal pain, constipation, diarrhea, nausea and vomiting.   Genitourinary:  Negative for dysuria and hematuria.   Musculoskeletal:  Negative for arthralgias, back pain, gait problem, joint swelling, myalgias and neck stiffness.   Skin:  Negative for pallor  and rash.   Neurological:  Negative for dizziness, tremors, seizures, syncope, light-headedness and headaches.   Hematological:  Negative for adenopathy.   Psychiatric/Behavioral:  Positive for dysphoric mood. Negative for behavioral problems. The patient is nervous/anxious.        Objective   There were no vitals taken for this visit.     Physical Exam  Constitutional:       General: He is not in acute distress.     Appearance: He is well-developed. He is not diaphoretic.   HENT:      Head: Normocephalic and atraumatic.      Right Ear: External ear normal.      Left Ear: External ear normal.      Nose: Nose normal.      Mouth/Throat:      Pharynx: No oropharyngeal exudate.   Eyes:      General: No scleral icterus.        Right eye: No discharge.         Left eye: No discharge.      Conjunctiva/sclera: Conjunctivae normal.      Pupils: Pupils are equal, round, and reactive to light.   Neck:      Thyroid: No thyromegaly.      Vascular: No JVD.      Trachea: No tracheal deviation.   Cardiovascular:      Rate and Rhythm: Normal rate. Rhythm irregularly irregular.      Heart sounds: Murmur (1/6 systolic murmur with an irregular heart rhythma) heard.      Systolic murmur is present with a grade of 1/6.      No friction rub. No gallop.   Pulmonary:      Effort: Pulmonary effort is normal. No respiratory distress.      Breath sounds: Normal breath sounds. No wheezing or rales.   Chest:      Chest wall: No tenderness.   Abdominal:      General: Bowel sounds are normal. There is no distension.      Palpations: Abdomen is soft. There is no mass.      Tenderness: There is no abdominal tenderness. There is no guarding or rebound.   Musculoskeletal:         General: No tenderness or deformity. Normal range of motion.      Cervical back: Normal range of motion and neck supple.   Lymphadenopathy:      Cervical: No cervical adenopathy.   Skin:     General: Skin is warm and dry.      Coloration: Skin is not pale.      Findings: No  erythema or rash.   Neurological:      Mental Status: He is alert and oriented to person, place, and time.      Cranial Nerves: No cranial nerve deficit.      Motor: No abnormal muscle tone.      Coordination: Coordination normal.      Deep Tendon Reflexes: Reflexes are normal and symmetric.   Psychiatric:         Mood and Affect: Mood is depressed.         Behavior: Behavior normal.

## 2025-04-25 NOTE — ASSESSMENT & PLAN NOTE
- blood pressure is well controlled   -continue with metoprolol and lisinopril  -continue with a low-salt diet and with exercise    Orders:    PSA, Total Screen; Future    CBC and differential; Future    TSH, 3rd generation with Free T4 reflex; Future    Comprehensive metabolic panel; Future    Lipid panel; Future    
- Currently mildly worsened secondary to life stressors including the loss of his wife  - continue with as needed alprazolam, which we will refill today as requested  - I discussed with patient that benzodiazepines are not really recommended at his age because of the side effects that can come with them and that we will continue with his prescription because of his long-term use only.  - Will refill alprazolam as requested  - The [facility] website was queried and did not show misuse  - Continue to follow with bereavement group    Orders:    ALPRAZolam (XANAX) 0.5 mg tablet; Take 1 tablet (0.5 mg total) by mouth daily at bedtime as needed for anxiety    PSA, Total Screen; Future    CBC and differential; Future    TSH, 3rd generation with Free T4 reflex; Future    Comprehensive metabolic panel; Future    Lipid panel; Future    
- Stable  - continue with vitamin D supplement  Orders:    PSA, Total Screen; Future    CBC and differential; Future    TSH, 3rd generation with Free T4 reflex; Future    Comprehensive metabolic panel; Future    Lipid panel; Future    Vitamin D 25 hydroxy; Future    
- Stable at recent CT chest dissection protocol done last month  - We will continue to monitor patient clinically and with imaging.  Orders:    PSA, Total Screen; Future    CBC and differential; Future    TSH, 3rd generation with Free T4 reflex; Future    Comprehensive metabolic panel; Future    Lipid panel; Future    
- well controlled   - continue with metoprolol and rivaroxaban    Orders:    PSA, Total Screen; Future    CBC and differential; Future    TSH, 3rd generation with Free T4 reflex; Future    Comprehensive metabolic panel; Future    Lipid panel; Future    
- well controlled, last TSH done on 5/29/2024 was normal at 1.020  -continue with levothyroxine at current dose    Orders:    PSA, Total Screen; Future    CBC and differential; Future    TSH, 3rd generation with Free T4 reflex; Future    Comprehensive metabolic panel; Future    Lipid panel; Future    
-Lipids are stable  - Continue with pravastatin  -continue with a heart healthy diet  -continue with exercise    Orders:    PSA, Total Screen; Future    CBC and differential; Future    TSH, 3rd generation with Free T4 reflex; Future    Comprehensive metabolic panel; Future    Lipid panel; Future    
-Well-controlled  -Continue to avoid diets and behaviors that trigger symptoms    Orders:    PSA, Total Screen; Future    CBC and differential; Future    TSH, 3rd generation with Free T4 reflex; Future    Comprehensive metabolic panel; Future    Lipid panel; Future    
PAIN SCALE 8 OF 10.

## 2025-05-22 DIAGNOSIS — F41.9 ANXIETY: ICD-10-CM

## 2025-05-22 RX ORDER — ALPRAZOLAM 0.5 MG
0.5 TABLET ORAL
Qty: 30 TABLET | Refills: 0 | Status: SHIPPED | OUTPATIENT
Start: 2025-05-22

## 2025-05-22 NOTE — TELEPHONE ENCOUNTER
Reason for call:   [x] Refill   [] Prior Auth  [] Other:     Office:   [x] PCP/Provider - Talia  [] Specialty/Provider -     Medication: Alprazolam 0.5mg    Dose/Frequency: 1 tab hs prn    Quantity: 90    Pharmacy: [facility] - [address] [phone]     Mail Away Pharmacy   Does the patient have enough for 10 days?   [] Yes   [x] No - Send as HP to POD

## 2025-06-26 DIAGNOSIS — F41.9 ANXIETY: ICD-10-CM

## 2025-06-26 RX ORDER — ALPRAZOLAM 0.5 MG
0.5 TABLET ORAL
Qty: 30 TABLET | Refills: 0 | Status: SHIPPED | OUTPATIENT
Start: 2025-06-26

## 2025-06-26 NOTE — TELEPHONE ENCOUNTER
Reason for call:   [x] Refill   [] Prior Auth  [] Other:     Office:   [x] PCP/Provider -   [] Specialty/Provider -     Medication: Alprazolam 0.5 mg, take 1 tablet by mouth daily at bedtime as needed      Pharmacy: [facility]       Mail Away Pharmacy   Does the patient have enough for 10 days?   [x] Yes   [] No - Send as HP to POD

## 2025-07-21 DIAGNOSIS — F41.9 ANXIETY: ICD-10-CM

## 2025-07-21 NOTE — TELEPHONE ENCOUNTER
Reason for call:   [x] Refill   [] Prior Auth  [] Other:     Office:   [x] PCP/Provider - : Natty Melgar,    [facility]   [] Specialty/Provider -     Medication: ALPRAZolam (XANAX)         Dose/Frequency: 0.5 mg             Daily at bedtime PRN     Quantity: 30    Pharmacy: [facility]- [place], [place]    Local Pharmacy   Does the patient have enough for 3 days?   [] Yes   [] No - Send as HP to POD    Mail Away Pharmacy   Does the patient have enough for 10 days?   [x] Yes   [] No - Send as HP to POD

## 2025-07-22 RX ORDER — ALPRAZOLAM 0.5 MG
0.5 TABLET ORAL
Qty: 30 TABLET | Refills: 0 | Status: SHIPPED | OUTPATIENT
Start: 2025-07-26

## 2025-07-28 DIAGNOSIS — F41.9 ANXIETY: ICD-10-CM

## 2025-07-28 RX ORDER — ALPRAZOLAM 0.5 MG
0.5 TABLET ORAL
Qty: 30 TABLET | Refills: 0 | Status: CANCELLED | OUTPATIENT
Start: 2025-07-28

## 2025-07-29 ENCOUNTER — OFFICE VISIT (OUTPATIENT)
Dept: CARDIOLOGY CLINIC | Facility: CLINIC | Age: 80
End: 2025-07-29
Payer: COMMERCIAL

## 2025-07-29 VITALS
HEIGHT: 68 IN | SYSTOLIC BLOOD PRESSURE: 110 MMHG | WEIGHT: 174.2 LBS | OXYGEN SATURATION: 94 % | DIASTOLIC BLOOD PRESSURE: 76 MMHG | HEART RATE: 66 BPM | BODY MASS INDEX: 26.4 KG/M2

## 2025-07-29 DIAGNOSIS — E78.2 MIXED HYPERLIPIDEMIA: ICD-10-CM

## 2025-07-29 DIAGNOSIS — I71.21 ANEURYSM OF ASCENDING AORTA WITHOUT RUPTURE (HCC): Primary | ICD-10-CM

## 2025-07-29 PROCEDURE — 99214 OFFICE O/P EST MOD 30 MIN: CPT | Performed by: INTERNAL MEDICINE

## 2025-07-29 RX ORDER — EZETIMIBE 10 MG/1
10 TABLET ORAL DAILY
Qty: 90 TABLET | Refills: 3 | Status: SHIPPED | OUTPATIENT
Start: 2025-07-29